# Patient Record
Sex: FEMALE | Race: WHITE | NOT HISPANIC OR LATINO | Employment: OTHER | URBAN - METROPOLITAN AREA
[De-identification: names, ages, dates, MRNs, and addresses within clinical notes are randomized per-mention and may not be internally consistent; named-entity substitution may affect disease eponyms.]

---

## 2023-03-23 LAB
RBC, URINE: 27 /HPF (ref 0–5)
SQUAMOUS EPITHELIAL CELLS, URINE: 7 /HPF
WBC CLUMPS, URINE: ABNORMAL /HPF
WBC, URINE: 62 /HPF (ref 0–5)

## 2023-03-24 LAB — URINE CULTURE: NORMAL

## 2023-04-13 LAB
6-ACETYLMORPHINE: <25 NG/ML
7-AMINOCLONAZEPAM: <25 NG/ML
ALPHA-HYDROXYALPRAZOLAM: <25 NG/ML
ALPHA-HYDROXYMIDAZOLAM: <25 NG/ML
ALPRAZOLAM: <25 NG/ML
AMPHETAMINE (PRESENCE) IN URINE BY SCREEN METHOD: ABNORMAL
BARBITURATES PRESENCE IN URINE BY SCREEN METHOD: ABNORMAL
CANNABINOIDS IN URINE BY SCREEN METHOD: ABNORMAL
CHLORDIAZEPOXIDE: <25 NG/ML
CLONAZEPAM: <25 NG/ML
COCAINE (PRESENCE) IN URINE BY SCREEN METHOD: ABNORMAL
CODEINE: <50 NG/ML
CREATINE, URINE FOR DRUG: 58.2 MG/DL
DIAZEPAM: <25 NG/ML
DRUG SCREEN COMMENT URINE: ABNORMAL
EDDP: <25 NG/ML
FENTANYL CONFIRMATION, URINE: <2.5 NG/ML
HYDROCODONE: 77 NG/ML
HYDROMORPHONE: 36 NG/ML
LORAZEPAM: <25 NG/ML
METHADONE CONFIRMATION,URINE: <25 NG/ML
MIDAZOLAM: <25 NG/ML
MORPHINE URINE: <50 NG/ML
NORDIAZEPAM: <25 NG/ML
NORFENTANYL: <2.5 NG/ML
NORHYDROCODONE: 95 NG/ML
NOROXYCODONE: <25 NG/ML
O-DESMETHYLTRAMADOL: >1000 NG/ML
OXAZEPAM: <25 NG/ML
OXYCODONE: <25 NG/ML
OXYMORPHONE: <25 NG/ML
PHENCYCLIDINE (PRESENCE) IN URINE BY SCREEN METHOD: ABNORMAL
TEMAZEPAM: <25 NG/ML
TRAMADOL: >1000 NG/ML
ZOLPIDEM METABOLITE (ZCA): <25 NG/ML
ZOLPIDEM: <25 NG/ML

## 2023-04-25 LAB
ALANINE AMINOTRANSFERASE (SGPT) (U/L) IN SER/PLAS: 17 U/L (ref 7–45)
ASPARTATE AMINOTRANSFERASE (SGOT) (U/L) IN SER/PLAS: 18 U/L (ref 9–39)
BACTERIA, URINE: ABNORMAL /HPF
CHOLESTEROL (MG/DL) IN SER/PLAS: 133 MG/DL (ref 0–199)
CHOLESTEROL IN HDL (MG/DL) IN SER/PLAS: 48.2 MG/DL
CHOLESTEROL/HDL RATIO: 2.8
HYALINE CASTS, URINE: ABNORMAL /LPF
LDL: 44 MG/DL (ref 0–99)
NON HDL CHOLESTEROL: 85 MG/DL
RBC, URINE: 2 /HPF (ref 0–5)
SQUAMOUS EPITHELIAL CELLS, URINE: 4 /HPF
TRIGLYCERIDE (MG/DL) IN SER/PLAS: 205 MG/DL (ref 0–149)
VLDL: 41 MG/DL (ref 0–40)
WBC CLUMPS, URINE: ABNORMAL /HPF
WBC, URINE: 75 /HPF (ref 0–5)

## 2023-04-28 LAB — URINE CULTURE: ABNORMAL

## 2023-07-06 LAB
RBC, URINE: 14 /HPF (ref 0–5)
SQUAMOUS EPITHELIAL CELLS, URINE: 2 /HPF
WBC CLUMPS, URINE: ABNORMAL /HPF
WBC, URINE: 274 /HPF (ref 0–5)

## 2023-07-07 LAB — URINE CULTURE: NORMAL

## 2023-09-18 LAB
MUCUS, URINE: ABNORMAL /LPF
RBC, URINE: 8 /HPF (ref 0–5)
SQUAMOUS EPITHELIAL CELLS, URINE: 3 /HPF
TRANSITIONAL EPITHELIAL CELLS, URINE: <1 /HPF
WBC CLUMPS, URINE: ABNORMAL /HPF
WBC, URINE: 92 /HPF (ref 0–5)

## 2023-09-20 LAB — URINE CULTURE: ABNORMAL

## 2023-10-16 PROBLEM — M16.12 ARTHRITIS OF LEFT HIP: Status: ACTIVE | Noted: 2023-10-16

## 2023-10-16 PROBLEM — R30.0 DYSURIA: Status: ACTIVE | Noted: 2023-10-16

## 2023-10-16 PROBLEM — G89.29 CHRONIC RADICULAR LUMBAR PAIN: Status: ACTIVE | Noted: 2023-10-16

## 2023-10-16 PROBLEM — M54.16 CHRONIC RADICULAR LUMBAR PAIN: Status: ACTIVE | Noted: 2023-10-16

## 2023-10-16 PROBLEM — B37.9 CANDIDIASIS: Status: ACTIVE | Noted: 2023-10-16

## 2023-10-16 PROBLEM — I71.40 ABDOMINAL ANEURYSM (CMS-HCC): Status: ACTIVE | Noted: 2023-10-16

## 2023-10-16 PROBLEM — G89.29 CHRONIC HIP PAIN AFTER TOTAL REPLACEMENT OF RIGHT HIP JOINT: Status: ACTIVE | Noted: 2023-10-16

## 2023-10-16 PROBLEM — R60.9 EDEMA: Status: ACTIVE | Noted: 2023-10-16

## 2023-10-16 PROBLEM — K52.9 CHRONIC DIARRHEA: Status: ACTIVE | Noted: 2023-10-16

## 2023-10-16 PROBLEM — M25.551 CHRONIC HIP PAIN AFTER TOTAL REPLACEMENT OF RIGHT HIP JOINT: Status: ACTIVE | Noted: 2023-10-16

## 2023-10-16 PROBLEM — I10 HYPERTENSION: Status: ACTIVE | Noted: 2023-10-16

## 2023-10-16 PROBLEM — M51.36 DEGENERATIVE DISC DISEASE, LUMBAR: Status: ACTIVE | Noted: 2023-10-16

## 2023-10-16 PROBLEM — M65.311 TRIGGER FINGER OF RIGHT THUMB: Status: ACTIVE | Noted: 2023-10-16

## 2023-10-16 PROBLEM — K21.00 GASTROESOPHAGEAL REFLUX DISEASE WITH ESOPHAGITIS: Status: ACTIVE | Noted: 2023-10-16

## 2023-10-16 PROBLEM — Z96.641 CHRONIC HIP PAIN AFTER TOTAL REPLACEMENT OF RIGHT HIP JOINT: Status: ACTIVE | Noted: 2023-10-16

## 2023-10-16 PROBLEM — Z96.642 STATUS POST LEFT HIP REPLACEMENT: Status: ACTIVE | Noted: 2023-10-16

## 2023-10-16 PROBLEM — M48.00 SPINAL STENOSIS: Status: ACTIVE | Noted: 2023-10-16

## 2023-10-16 PROBLEM — I70.90 ATHEROSCLEROSIS: Status: ACTIVE | Noted: 2023-10-16

## 2023-10-16 PROBLEM — F32.A DEPRESSION: Status: ACTIVE | Noted: 2023-10-16

## 2023-10-16 PROBLEM — N95.2 ATROPHY OF VAGINA: Status: ACTIVE | Noted: 2023-10-16

## 2023-10-16 PROBLEM — M54.30 SCIATICA: Status: ACTIVE | Noted: 2023-10-16

## 2023-10-16 PROBLEM — E11.9 DIABETES MELLITUS (MULTI): Status: ACTIVE | Noted: 2023-10-16

## 2023-10-16 PROBLEM — M51.369 DEGENERATIVE DISC DISEASE, LUMBAR: Status: ACTIVE | Noted: 2023-10-16

## 2023-10-16 PROBLEM — M54.50 LUMBAGO: Status: ACTIVE | Noted: 2023-10-16

## 2023-10-16 PROBLEM — M54.16 RIGHT LUMBAR RADICULOPATHY: Status: ACTIVE | Noted: 2023-10-16

## 2023-10-16 PROBLEM — M17.9 OSTEOARTHRITIS OF KNEE: Status: ACTIVE | Noted: 2023-10-16

## 2023-10-16 PROBLEM — M65.30 TRIGGER FINGER: Status: ACTIVE | Noted: 2023-10-16

## 2023-10-16 PROBLEM — Z96.641 STATUS POST RIGHT HIP REPLACEMENT: Status: ACTIVE | Noted: 2023-10-16

## 2023-10-16 PROBLEM — E55.9 VITAMIN D INSUFFICIENCY: Status: ACTIVE | Noted: 2023-10-16

## 2023-10-16 PROBLEM — Z79.891 LONG TERM (CURRENT) USE OF OPIATE ANALGESIC: Status: ACTIVE | Noted: 2023-10-16

## 2023-10-16 PROBLEM — M51.26 HERNIATED NUCLEUS PULPOSUS, L4-5: Status: ACTIVE | Noted: 2023-10-16

## 2023-10-16 PROBLEM — M16.11 OSTEOARTHRITIS OF RIGHT HIP: Status: ACTIVE | Noted: 2023-10-16

## 2023-10-16 PROBLEM — K52.9 NONINFECTIVE GASTROENTERITIS AND COLITIS, UNSPECIFIED: Status: ACTIVE | Noted: 2023-10-16

## 2023-10-16 PROBLEM — F17.200 TOBACCO DEPENDENCE: Status: ACTIVE | Noted: 2023-10-16

## 2023-10-16 PROBLEM — F41.9 ANXIETY: Status: ACTIVE | Noted: 2023-10-16

## 2023-10-16 PROBLEM — E78.5 HYPERLIPIDEMIA: Status: ACTIVE | Noted: 2023-10-16

## 2023-10-16 PROBLEM — R06.00 DYSPNEA: Status: ACTIVE | Noted: 2023-10-16

## 2023-10-16 PROBLEM — M51.27 OTHER INTERVERTEBRAL DISC DISPLACEMENT, LUMBOSACRAL REGION: Status: ACTIVE | Noted: 2023-10-16

## 2023-10-16 PROBLEM — I25.10 CAD (CORONARY ARTERY DISEASE), NATIVE CORONARY ARTERY: Status: ACTIVE | Noted: 2023-10-16

## 2023-10-16 PROBLEM — R80.1 PERSISTENT PROTEINURIA: Status: ACTIVE | Noted: 2023-10-16

## 2023-10-16 RX ORDER — GABAPENTIN 300 MG/1
300 CAPSULE ORAL 2 TIMES DAILY
COMMUNITY
End: 2024-01-16 | Stop reason: SDUPTHER

## 2023-10-16 RX ORDER — CYCLOBENZAPRINE HCL 5 MG
5 TABLET ORAL 3 TIMES DAILY PRN
COMMUNITY
End: 2024-01-16 | Stop reason: SDUPTHER

## 2023-10-16 RX ORDER — ATORVASTATIN CALCIUM 80 MG/1
1 TABLET, FILM COATED ORAL DAILY
COMMUNITY

## 2023-10-16 RX ORDER — METFORMIN HYDROCHLORIDE 500 MG/1
500 TABLET, EXTENDED RELEASE ORAL EVERY EVENING
COMMUNITY

## 2023-10-16 RX ORDER — PNV NO.95/FERROUS FUM/FOLIC AC 28MG-0.8MG
1 TABLET ORAL DAILY
COMMUNITY
End: 2023-10-17 | Stop reason: SDUPTHER

## 2023-10-16 RX ORDER — HYDROCODONE BITARTRATE AND ACETAMINOPHEN 5; 325 MG/1; MG/1
1 TABLET ORAL EVERY 4 HOURS PRN
COMMUNITY
End: 2023-10-25 | Stop reason: ALTCHOICE

## 2023-10-16 RX ORDER — SOLIFENACIN SUCCINATE 5 MG/1
5 TABLET, FILM COATED ORAL EVERY OTHER DAY
COMMUNITY
End: 2024-04-18 | Stop reason: ALTCHOICE

## 2023-10-16 RX ORDER — AMLODIPINE AND BENAZEPRIL HYDROCHLORIDE 5; 20 MG/1; MG/1
1 CAPSULE ORAL DAILY
COMMUNITY
End: 2023-10-25 | Stop reason: ALTCHOICE

## 2023-10-16 RX ORDER — ASPIRIN 81 MG/1
1 TABLET ORAL DAILY
COMMUNITY

## 2023-10-16 RX ORDER — ASCORBIC ACID 500 MG
1 TABLET ORAL DAILY
COMMUNITY
End: 2023-10-17 | Stop reason: SDUPTHER

## 2023-10-16 RX ORDER — LANOLIN ALCOHOL/MO/W.PET/CERES
CREAM (GRAM) TOPICAL EVERY OTHER DAY
COMMUNITY

## 2023-10-16 RX ORDER — AMLODIPINE BESYLATE 10 MG/1
1 TABLET ORAL DAILY
COMMUNITY
End: 2024-04-18 | Stop reason: WASHOUT

## 2023-10-16 RX ORDER — MULTIVITAMIN
1 TABLET ORAL DAILY
COMMUNITY

## 2023-10-16 RX ORDER — EZETIMIBE 10 MG/1
10 TABLET ORAL DAILY
COMMUNITY
End: 2024-04-26

## 2023-10-16 RX ORDER — BIOTIN 5000 MCG
1 TABLET,DISINTEGRATING ORAL DAILY
COMMUNITY
End: 2023-10-17 | Stop reason: SDUPTHER

## 2023-10-16 RX ORDER — OXYBUTYNIN CHLORIDE 5 MG/1
5 TABLET ORAL DAILY
COMMUNITY
End: 2024-04-18 | Stop reason: ALTCHOICE

## 2023-10-16 RX ORDER — ACETAMINOPHEN 325 MG/1
325 TABLET ORAL EVERY 6 HOURS PRN
COMMUNITY

## 2023-10-16 RX ORDER — FLUTICASONE PROPIONATE 50 MCG
1 SPRAY, SUSPENSION (ML) NASAL DAILY
COMMUNITY
End: 2023-10-25 | Stop reason: ALTCHOICE

## 2023-10-16 RX ORDER — METOPROLOL SUCCINATE 50 MG/1
1 TABLET, EXTENDED RELEASE ORAL DAILY
COMMUNITY
End: 2023-11-17

## 2023-10-16 RX ORDER — ZINC GLUCONATE 100 MG
TABLET ORAL
COMMUNITY

## 2023-10-16 RX ORDER — CIPROFLOXACIN 500 MG/1
500 TABLET ORAL 2 TIMES DAILY
COMMUNITY
End: 2023-10-25 | Stop reason: ALTCHOICE

## 2023-10-16 RX ORDER — ACETAMINOPHEN 500 MG
4125 TABLET ORAL DAILY
COMMUNITY

## 2023-10-16 RX ORDER — MULTIVIT-MIN/IRON/FOLIC ACID/K 18-600-40
CAPSULE ORAL
COMMUNITY

## 2023-10-16 RX ORDER — NYSTATIN 100000 U/G
CREAM TOPICAL 2 TIMES DAILY
COMMUNITY
End: 2023-10-25 | Stop reason: ALTCHOICE

## 2023-10-16 RX ORDER — MIRABEGRON 25 MG/1
2 TABLET, FILM COATED, EXTENDED RELEASE ORAL DAILY
COMMUNITY
End: 2024-04-18 | Stop reason: ALTCHOICE

## 2023-10-16 RX ORDER — NITROFURANTOIN (MACROCRYSTALS) 100 MG/1
100 CAPSULE ORAL
COMMUNITY
End: 2023-10-25 | Stop reason: ALTCHOICE

## 2023-10-16 RX ORDER — FAMOTIDINE, CALCIUM CARBONATE, AND MAGNESIUM HYDROXIDE 10; 800; 165 MG/1; MG/1; MG/1
1 TABLET, CHEWABLE ORAL EVERY 12 HOURS PRN
COMMUNITY

## 2023-10-16 RX ORDER — CHOLECALCIFEROL (VITAMIN D3) 25 MCG
50 TABLET ORAL DAILY
COMMUNITY
End: 2023-10-17 | Stop reason: SDUPTHER

## 2023-10-16 RX ORDER — HYDROCHLOROTHIAZIDE 25 MG/1
1 TABLET ORAL DAILY
COMMUNITY
End: 2024-04-24 | Stop reason: ALTCHOICE

## 2023-10-16 RX ORDER — MIRABEGRON 50 MG/1
1 TABLET, EXTENDED RELEASE ORAL DAILY
COMMUNITY
End: 2023-10-25 | Stop reason: DRUGHIGH

## 2023-10-16 RX ORDER — ASPIRIN 81 MG/1
81 TABLET ORAL DAILY
COMMUNITY
End: 2023-10-17 | Stop reason: SDUPTHER

## 2023-10-16 RX ORDER — HYDROCODONE BITARTRATE AND ACETAMINOPHEN 5; 325 MG/1; MG/1
1 TABLET ORAL 2 TIMES DAILY PRN
COMMUNITY
End: 2023-10-25 | Stop reason: ALTCHOICE

## 2023-10-16 RX ORDER — NALOXONE HYDROCHLORIDE 4 MG/.1ML
SPRAY NASAL
COMMUNITY
End: 2023-10-25 | Stop reason: ALTCHOICE

## 2023-10-16 RX ORDER — GABAPENTIN 300 MG/1
1 CAPSULE ORAL 3 TIMES DAILY
COMMUNITY
End: 2023-10-25 | Stop reason: DRUGHIGH

## 2023-10-16 RX ORDER — TRAMADOL HYDROCHLORIDE 50 MG/1
50 TABLET ORAL 2 TIMES DAILY PRN
COMMUNITY
End: 2024-03-05 | Stop reason: ALTCHOICE

## 2023-10-17 ENCOUNTER — OFFICE VISIT (OUTPATIENT)
Dept: UROLOGY | Facility: CLINIC | Age: 68
End: 2023-10-17
Payer: MEDICARE

## 2023-10-17 VITALS
HEIGHT: 64 IN | BODY MASS INDEX: 38.58 KG/M2 | DIASTOLIC BLOOD PRESSURE: 81 MMHG | SYSTOLIC BLOOD PRESSURE: 144 MMHG | WEIGHT: 226 LBS | HEART RATE: 80 BPM | TEMPERATURE: 98.1 F

## 2023-10-17 DIAGNOSIS — R35.1 NOCTURIA: ICD-10-CM

## 2023-10-17 DIAGNOSIS — N39.41 URGE INCONTINENCE: Primary | ICD-10-CM

## 2023-10-17 DIAGNOSIS — N39.0 RECURRENT UTI: ICD-10-CM

## 2023-10-17 LAB
POC APPEARANCE, URINE: CLEAR
POC BILIRUBIN, URINE: NEGATIVE
POC BLOOD, URINE: NEGATIVE
POC COLOR, URINE: YELLOW
POC GLUCOSE, URINE: NEGATIVE MG/DL
POC KETONES, URINE: NEGATIVE MG/DL
POC LEUKOCYTES, URINE: NEGATIVE
POC NITRITE,URINE: NEGATIVE
POC PH, URINE: 5.5 PH
POC PROTEIN, URINE: NEGATIVE MG/DL
POC SPECIFIC GRAVITY, URINE: 1.02
POC UROBILINOGEN, URINE: 0.2 EU/DL

## 2023-10-17 PROCEDURE — 3077F SYST BP >= 140 MM HG: CPT | Performed by: NURSE PRACTITIONER

## 2023-10-17 PROCEDURE — 1159F MED LIST DOCD IN RCRD: CPT | Performed by: NURSE PRACTITIONER

## 2023-10-17 PROCEDURE — 81003 URINALYSIS AUTO W/O SCOPE: CPT | Performed by: NURSE PRACTITIONER

## 2023-10-17 PROCEDURE — 3079F DIAST BP 80-89 MM HG: CPT | Performed by: NURSE PRACTITIONER

## 2023-10-17 PROCEDURE — 51798 US URINE CAPACITY MEASURE: CPT | Performed by: NURSE PRACTITIONER

## 2023-10-17 PROCEDURE — 99213 OFFICE O/P EST LOW 20 MIN: CPT | Performed by: NURSE PRACTITIONER

## 2023-10-17 NOTE — PROGRESS NOTES
"10/17/23   [id number]    Chief Complaint   Patient presents with    Follow-up    Urinary Frequency    Urinary Urgency    recurrent uti     Pt presents for 5 week follow up      Subjective  Med response, PVR     HPI Amanda De Jesus is a 68 y.o. female who presents for follow up recurrent UTIs. Dmanose, Estrogen vaginal cream, didn't tolerate nightly prophylactic abx caused nausea (macrodantin and trimethoprim); OAB managed w Myrbetriq 50 mg in late afternoon and Oxybutynin ER 5 mg added for bedtime, here today for med response, PVR; wasn't interested in 3rd line options; 75-80% better overall now; happy w combination therapy, a little constipation but manages w diet; no leakage now at all; monitoring fluids in evening; UA completely negative today;     9/18/23 Ecoli  4?25/23 Enterobacter    Cysto by Dr. Noble 9/22/21 normal  8/15/21 CT abd/pel w IV: atrophic left kidney, right kidney unremarkable;   8/20/222 GUSTAVO no hydro, similar finding to CT w left atrophic kidney;         Objective     /81   Pulse 80   Temp 36.7 °C (98.1 °F)   Ht 1.626 m (5' 4\")   Wt 103 kg (226 lb)   BMI 38.79 kg/m²    Physical Exam  Constitutional:       Appearance: Normal appearance. She is normal weight.   HENT:      Head: Normocephalic and atraumatic.      Nose: Nose normal.   Pulmonary:      Effort: Pulmonary effort is normal.   Musculoskeletal:         General: Normal range of motion.      Cervical back: Normal range of motion.   Neurological:      General: No focal deficit present.      Mental Status: She is alert and oriented to person, place, and time.   Psychiatric:         Mood and Affect: Mood normal.         Thought Content: Thought content normal.         Judgment: Judgment normal.         Assessment/Plan   Problem List Items Addressed This Visit    None  Visit Diagnoses       Urge incontinence    -  Primary    Relevant Orders    POCT UA Automated manually resulted (Completed)    Post-Void Residual (Completed)    Recurrent UTI "        Nocturia              Orders Placed This Encounter   Procedures    Post-Void Residual    POCT UA Automated manually resulted     Order Specific Question:   Release result to [facility]     Answer:   Immediate [1]      Continue UTI prevention w Dmanose and Estrogen vaginal cream  Continue OAB management w Myrbetriq 50 mg and Oxybutynin ER 5 mg  Follow up 6 mos  Sooner if any concerns  Nurse line [phone]       Janett Giles, APRN-CNP  Lab Results   Component Value Date    GLUCOSE 93 09/30/2022    CALCIUM 9.7 09/30/2022     09/30/2022    K 4.3 09/30/2022    CO2 28 09/30/2022     09/30/2022    BUN 18 09/30/2022    CREATININE 1.11 (H) 09/30/2022

## 2023-10-17 NOTE — PATIENT INSTRUCTIONS
Continue UTI prevention w Dmanose and Estrogen vaginal cream  Continue OAB management w Myrbetriq 50 mg and Oxybutynin ER 5 mg  Follow up 6 mos  Sooner if any concerns  Nurse line [phone]

## 2023-10-23 ENCOUNTER — LAB (OUTPATIENT)
Dept: LAB | Facility: LAB | Age: 68
End: 2023-10-23
Payer: MEDICARE

## 2023-10-23 ENCOUNTER — ANCILLARY PROCEDURE (OUTPATIENT)
Dept: RADIOLOGY | Facility: CLINIC | Age: 68
End: 2023-10-23
Payer: MEDICARE

## 2023-10-23 DIAGNOSIS — E11.9 TYPE 2 DIABETES MELLITUS WITHOUT COMPLICATIONS (MULTI): ICD-10-CM

## 2023-10-23 DIAGNOSIS — N39.41 URGE INCONTINENCE: ICD-10-CM

## 2023-10-23 DIAGNOSIS — Z00.00 ENCOUNTER FOR GENERAL ADULT MEDICAL EXAMINATION WITHOUT ABNORMAL FINDINGS: Primary | ICD-10-CM

## 2023-10-23 DIAGNOSIS — Z79.891 LONG TERM (CURRENT) USE OF OPIATE ANALGESIC: ICD-10-CM

## 2023-10-23 DIAGNOSIS — M51.26 OTHER INTERVERTEBRAL DISC DISPLACEMENT, LUMBAR REGION: ICD-10-CM

## 2023-10-23 LAB
ALBUMIN SERPL BCP-MCNC: 4.2 G/DL (ref 3.4–5)
ALP SERPL-CCNC: 92 U/L (ref 33–136)
ALT SERPL W P-5'-P-CCNC: 21 U/L (ref 7–45)
AMPHETAMINES UR QL SCN: NORMAL
ANION GAP SERPL CALC-SCNC: 15 MMOL/L (ref 10–20)
AST SERPL W P-5'-P-CCNC: 20 U/L (ref 9–39)
BARBITURATES UR QL SCN: NORMAL
BASOPHILS # BLD AUTO: 0.03 X10*3/UL (ref 0–0.1)
BASOPHILS NFR BLD AUTO: 0.5 %
BILIRUB SERPL-MCNC: 0.6 MG/DL (ref 0–1.2)
BUN SERPL-MCNC: 27 MG/DL (ref 6–23)
BZE UR QL SCN: NORMAL
CALCIUM SERPL-MCNC: 10.1 MG/DL (ref 8.6–10.6)
CANNABINOIDS UR QL SCN: NORMAL
CHLORIDE SERPL-SCNC: 102 MMOL/L (ref 98–107)
CHOLEST SERPL-MCNC: 138 MG/DL (ref 0–199)
CHOLESTEROL/HDL RATIO: 2.8
CO2 SERPL-SCNC: 27 MMOL/L (ref 21–32)
CREAT SERPL-MCNC: 1.31 MG/DL (ref 0.5–1.05)
CREAT UR-MCNC: 74.9 MG/DL (ref 20–320)
EOSINOPHIL # BLD AUTO: 0.15 X10*3/UL (ref 0–0.7)
EOSINOPHIL NFR BLD AUTO: 2.4 %
ERYTHROCYTE [DISTWIDTH] IN BLOOD BY AUTOMATED COUNT: 14.6 % (ref 11.5–14.5)
EST. AVERAGE GLUCOSE BLD GHB EST-MCNC: 143 MG/DL
GFR SERPL CREATININE-BSD FRML MDRD: 44 ML/MIN/1.73M*2
GLUCOSE SERPL-MCNC: 107 MG/DL (ref 74–99)
HBA1C MFR BLD: 6.6 %
HCT VFR BLD AUTO: 46.4 % (ref 36–46)
HDLC SERPL-MCNC: 49.5 MG/DL
HGB BLD-MCNC: 15.3 G/DL (ref 12–16)
IMM GRANULOCYTES # BLD AUTO: 0.02 X10*3/UL (ref 0–0.7)
IMM GRANULOCYTES NFR BLD AUTO: 0.3 % (ref 0–0.9)
LDLC SERPL CALC-MCNC: 50 MG/DL
LYMPHOCYTES # BLD AUTO: 2.27 X10*3/UL (ref 1.2–4.8)
LYMPHOCYTES NFR BLD AUTO: 36.8 %
MAGNESIUM SERPL-MCNC: 2.18 MG/DL (ref 1.6–2.4)
MCH RBC QN AUTO: 31.2 PG (ref 26–34)
MCHC RBC AUTO-ENTMCNC: 33 G/DL (ref 32–36)
MCV RBC AUTO: 95 FL (ref 80–100)
MONOCYTES # BLD AUTO: 0.55 X10*3/UL (ref 0.1–1)
MONOCYTES NFR BLD AUTO: 8.9 %
NEUTROPHILS # BLD AUTO: 3.15 X10*3/UL (ref 1.2–7.7)
NEUTROPHILS NFR BLD AUTO: 51.1 %
NON HDL CHOLESTEROL: 89 MG/DL (ref 0–149)
NRBC BLD-RTO: 0 /100 WBCS (ref 0–0)
PCP UR QL SCN: NORMAL
PLATELET # BLD AUTO: 207 X10*3/UL (ref 150–450)
PMV BLD AUTO: 10.4 FL (ref 7.5–11.5)
POTASSIUM SERPL-SCNC: 4.3 MMOL/L (ref 3.5–5.3)
PROT SERPL-MCNC: 7.3 G/DL (ref 6.4–8.2)
RBC # BLD AUTO: 4.91 X10*6/UL (ref 4–5.2)
SODIUM SERPL-SCNC: 140 MMOL/L (ref 136–145)
TRIGL SERPL-MCNC: 195 MG/DL (ref 0–149)
TSH SERPL-ACNC: 1.77 MIU/L (ref 0.44–3.98)
VLDL: 39 MG/DL (ref 0–40)
WBC # BLD AUTO: 6.2 X10*3/UL (ref 4.4–11.3)

## 2023-10-23 PROCEDURE — 80373 DRUG SCREENING TRAMADOL: CPT

## 2023-10-23 PROCEDURE — 80354 DRUG SCREENING FENTANYL: CPT

## 2023-10-23 PROCEDURE — 80307 DRUG TEST PRSMV CHEM ANLYZR: CPT

## 2023-10-23 PROCEDURE — 83735 ASSAY OF MAGNESIUM: CPT

## 2023-10-23 PROCEDURE — 84443 ASSAY THYROID STIM HORMONE: CPT

## 2023-10-23 PROCEDURE — 85025 COMPLETE CBC W/AUTO DIFF WBC: CPT

## 2023-10-23 PROCEDURE — 80368 SEDATIVE HYPNOTICS: CPT

## 2023-10-23 PROCEDURE — 80053 COMPREHEN METABOLIC PANEL: CPT

## 2023-10-23 PROCEDURE — 80346 BENZODIAZEPINES1-12: CPT

## 2023-10-23 PROCEDURE — 80061 LIPID PANEL: CPT

## 2023-10-23 PROCEDURE — 36415 COLL VENOUS BLD VENIPUNCTURE: CPT

## 2023-10-23 PROCEDURE — 72120 X-RAY BEND ONLY L-S SPINE: CPT | Mod: FY

## 2023-10-23 PROCEDURE — 80358 DRUG SCREENING METHADONE: CPT

## 2023-10-23 PROCEDURE — 72114 X-RAY EXAM L-S SPINE BENDING: CPT | Performed by: RADIOLOGY

## 2023-10-23 PROCEDURE — 80365 DRUG SCREENING OXYCODONE: CPT

## 2023-10-23 PROCEDURE — 80361 OPIATES 1 OR MORE: CPT

## 2023-10-23 PROCEDURE — 83036 HEMOGLOBIN GLYCOSYLATED A1C: CPT

## 2023-10-23 PROCEDURE — 82570 ASSAY OF URINE CREATININE: CPT

## 2023-10-25 ENCOUNTER — OFFICE VISIT (OUTPATIENT)
Dept: CARDIOLOGY | Facility: HOSPITAL | Age: 68
End: 2023-10-25
Payer: MEDICARE

## 2023-10-25 ENCOUNTER — HOSPITAL ENCOUNTER (OUTPATIENT)
Dept: VASCULAR MEDICINE | Facility: HOSPITAL | Age: 68
Discharge: HOME | End: 2023-10-25
Payer: MEDICARE

## 2023-10-25 VITALS
SYSTOLIC BLOOD PRESSURE: 135 MMHG | BODY MASS INDEX: 38.46 KG/M2 | HEIGHT: 64 IN | DIASTOLIC BLOOD PRESSURE: 75 MMHG | HEART RATE: 80 BPM | WEIGHT: 225.3 LBS

## 2023-10-25 DIAGNOSIS — I25.10 CORONARY ARTERY DISEASE INVOLVING NATIVE CORONARY ARTERY OF NATIVE HEART WITHOUT ANGINA PECTORIS: ICD-10-CM

## 2023-10-25 DIAGNOSIS — I73.9 CLAUDICATION (CMS-HCC): Primary | ICD-10-CM

## 2023-10-25 DIAGNOSIS — I73.9 CLAUDICATION (CMS-HCC): ICD-10-CM

## 2023-10-25 DIAGNOSIS — I71.40 ABDOMINAL AORTIC ANEURYSM (AAA) WITHOUT RUPTURE, UNSPECIFIED PART (CMS-HCC): Primary | ICD-10-CM

## 2023-10-25 LAB
ATRIAL RATE: 80 BPM
P AXIS: 6 DEGREES
P OFFSET: 188 MS
P ONSET: 134 MS
PR INTERVAL: 184 MS
Q ONSET: 226 MS
QRS COUNT: 14 BEATS
QRS DURATION: 70 MS
QT INTERVAL: 388 MS
QTC CALCULATION(BAZETT): 447 MS
QTC FREDERICIA: 427 MS
R AXIS: 68 DEGREES
T AXIS: 69 DEGREES
T OFFSET: 420 MS
VENTRICULAR RATE: 80 BPM

## 2023-10-25 PROCEDURE — 1159F MED LIST DOCD IN RCRD: CPT | Performed by: INTERNAL MEDICINE

## 2023-10-25 PROCEDURE — 93005 ELECTROCARDIOGRAM TRACING: CPT | Performed by: INTERNAL MEDICINE

## 2023-10-25 PROCEDURE — 3048F LDL-C <100 MG/DL: CPT | Performed by: INTERNAL MEDICINE

## 2023-10-25 PROCEDURE — 3044F HG A1C LEVEL LT 7.0%: CPT | Performed by: INTERNAL MEDICINE

## 2023-10-25 PROCEDURE — 93922 UPR/L XTREMITY ART 2 LEVELS: CPT

## 2023-10-25 PROCEDURE — 3075F SYST BP GE 130 - 139MM HG: CPT | Performed by: INTERNAL MEDICINE

## 2023-10-25 PROCEDURE — 3078F DIAST BP <80 MM HG: CPT | Performed by: INTERNAL MEDICINE

## 2023-10-25 PROCEDURE — 1160F RVW MEDS BY RX/DR IN RCRD: CPT | Performed by: INTERNAL MEDICINE

## 2023-10-25 PROCEDURE — 3060F POS MICROALBUMINURIA REV: CPT | Performed by: INTERNAL MEDICINE

## 2023-10-25 PROCEDURE — 99214 OFFICE O/P EST MOD 30 MIN: CPT | Performed by: INTERNAL MEDICINE

## 2023-10-25 PROCEDURE — 93922 UPR/L XTREMITY ART 2 LEVELS: CPT | Performed by: INTERNAL MEDICINE

## 2023-10-25 PROCEDURE — 99214 OFFICE O/P EST MOD 30 MIN: CPT | Mod: 25 | Performed by: INTERNAL MEDICINE

## 2023-10-25 PROCEDURE — 93010 ELECTROCARDIOGRAM REPORT: CPT | Performed by: INTERNAL MEDICINE

## 2023-10-25 RX ORDER — VARENICLINE TARTRATE 0.5 MG/1
0.5 TABLET, FILM COATED ORAL 2 TIMES DAILY
COMMUNITY

## 2023-10-25 ASSESSMENT — ENCOUNTER SYMPTOMS
OCCASIONAL FEELINGS OF UNSTEADINESS: 0
LOSS OF SENSATION IN FEET: 0
DEPRESSION: 0

## 2023-10-25 NOTE — PROGRESS NOTES
Subjective:  Amanda returns for routine 6-month follow-up.  She generally has been clinically stable.  She has not had any hospitalizations.  She denies any other new health concerns.  She denies any exertional chest discomfort at a good activity level.  This is in the setting of very remote inferior MI and RCA stenting.  She is taking her medications compliantly and is tolerating them well.  She was having a cough and she had her benazepril stopped and losartan was substituted.  She overall is generally happy with how she is doing.  She is having some atypical leg symptoms and did have a screening circulation test which raise possibility of some PAD.  She did not bring the test results in with her.  She remains compliant with her combination therapy for her hyperlipidemia.  She has had a good response to the addition of ezetimibe.    Objective:  General: Alert, usual pleasant self.  HEENT: Unchanged.  Lungs: No crackles or wheezing.  Cardiac: Distant heart tones without murmur rub or S3.  Abdomen: Nontender.  Extremities: No edema.  Skin: No rash.  Neuro: Grossly intact.    EKG: Normal sinus rhythm.  Low voltage.  Slow R wave progression.    Lipid panel: Cholesterol-133, HDL-48, LDL-44, TG-205.    Impression/plan:    Amanda is doing well at this time.  She does not have any concerning symptoms to suggest progressive coronary disease.  Her heart rate and blood pressure remain under good control on her current regimen.  Her lipid panel also looks quite good.  I will plan on checking formal ABIs just to make sure we are not missing any occult significant PAD that may warrant attention.  I will review these test results with her when they are available.  I will see her back in follow-up in 6 months but she knows to call for any intercurrent concerns.  She did not need any prescriptions renewed.      Patient instructions:    Continue current medications unchanged.    Obtain your ABIs and call for results.    Return to  clinic in 6 months.

## 2023-10-26 ENCOUNTER — TELEPHONE (OUTPATIENT)
Dept: CARDIOLOGY | Facility: HOSPITAL | Age: 68
End: 2023-10-26
Payer: MEDICARE

## 2023-10-26 LAB
1OH-MIDAZOLAM UR CFM-MCNC: <25 NG/ML
6MAM UR CFM-MCNC: <25 NG/ML
7AMINOCLONAZEPAM UR CFM-MCNC: <25 NG/ML
A-OH ALPRAZ UR CFM-MCNC: <25 NG/ML
ALPRAZ UR CFM-MCNC: <25 NG/ML
CHLORDIAZEP UR CFM-MCNC: <25 NG/ML
CLONAZEPAM UR CFM-MCNC: <25 NG/ML
CODEINE UR CFM-MCNC: <50 NG/ML
DIAZEPAM UR CFM-MCNC: <25 NG/ML
EDDP UR CFM-MCNC: <25 NG/ML
FENTANYL UR CFM-MCNC: <2.5 NG/ML
HYDROCODONE CTO UR CFM-MCNC: <25 NG/ML
HYDROMORPHONE UR CFM-MCNC: <25 NG/ML
LORAZEPAM UR CFM-MCNC: <25 NG/ML
METHADONE UR CFM-MCNC: <25 NG/ML
MIDAZOLAM UR CFM-MCNC: <25 NG/ML
MORPHINE UR CFM-MCNC: <50 NG/ML
NORDIAZEPAM UR CFM-MCNC: <25 NG/ML
NORFENTANYL UR CFM-MCNC: <2.5 NG/ML
NORHYDROCODONE UR CFM-MCNC: <25 NG/ML
NOROXYCODONE UR CFM-MCNC: <25 NG/ML
NORTRAMADOL UR-MCNC: >1000 NG/ML
NORTRAMADOL UR-MCNC: >1000 NG/ML
OXAZEPAM UR CFM-MCNC: <25 NG/ML
OXYCODONE UR CFM-MCNC: <25 NG/ML
OXYMORPHONE UR CFM-MCNC: <25 NG/ML
TEMAZEPAM UR CFM-MCNC: <25 NG/ML
TRAMADOL UR CFM-MCNC: >1000 NG/ML
TRAMADOL UR CFM-MCNC: >1000 NG/ML
ZOLPIDEM UR CFM-MCNC: <25 NG/ML
ZOLPIDEM UR-MCNC: <25 NG/ML

## 2023-11-02 ENCOUNTER — ANCILLARY PROCEDURE (OUTPATIENT)
Dept: RADIOLOGY | Facility: CLINIC | Age: 68
End: 2023-11-02
Payer: MEDICARE

## 2023-11-02 ENCOUNTER — TELEPHONE (OUTPATIENT)
Dept: CARDIOLOGY | Facility: HOSPITAL | Age: 68
End: 2023-11-02

## 2023-11-02 DIAGNOSIS — F17.200 NICOTINE DEPENDENCE, UNSPECIFIED, UNCOMPLICATED: ICD-10-CM

## 2023-11-02 DIAGNOSIS — Z87.891 HX OF SMOKING: ICD-10-CM

## 2023-11-02 DIAGNOSIS — I71.40 ABDOMINAL AORTIC ANEURYSM (AAA) WITHOUT RUPTURE, UNSPECIFIED PART (CMS-HCC): Primary | ICD-10-CM

## 2023-11-02 PROCEDURE — 71271 CT THORAX LUNG CANCER SCR C-: CPT

## 2023-11-02 PROCEDURE — 71271 CT THORAX LUNG CANCER SCR C-: CPT | Performed by: RADIOLOGY

## 2023-11-02 PROCEDURE — 74174 CTA ABD&PLVS W/CONTRAST: CPT

## 2023-11-02 PROCEDURE — 2550000001 HC RX 255 CONTRASTS: Performed by: PHYSICAL MEDICINE & REHABILITATION

## 2023-11-02 PROCEDURE — 71275 CT ANGIOGRAPHY CHEST: CPT | Performed by: STUDENT IN AN ORGANIZED HEALTH CARE EDUCATION/TRAINING PROGRAM

## 2023-11-02 PROCEDURE — 74174 CTA ABD&PLVS W/CONTRAST: CPT | Performed by: STUDENT IN AN ORGANIZED HEALTH CARE EDUCATION/TRAINING PROGRAM

## 2023-11-02 RX ADMIN — IOHEXOL 100 ML: 350 INJECTION, SOLUTION INTRAVENOUS at 10:22

## 2023-11-09 ENCOUNTER — APPOINTMENT (OUTPATIENT)
Dept: RADIOLOGY | Facility: CLINIC | Age: 68
End: 2023-11-09
Payer: MEDICARE

## 2023-11-13 ENCOUNTER — OFFICE VISIT (OUTPATIENT)
Dept: VASCULAR SURGERY | Facility: HOSPITAL | Age: 68
End: 2023-11-13
Payer: MEDICARE

## 2023-11-13 VITALS
HEART RATE: 86 BPM | HEIGHT: 64 IN | OXYGEN SATURATION: 93 % | WEIGHT: 226 LBS | BODY MASS INDEX: 38.58 KG/M2 | DIASTOLIC BLOOD PRESSURE: 85 MMHG | SYSTOLIC BLOOD PRESSURE: 144 MMHG

## 2023-11-13 DIAGNOSIS — I71.42 JUXTARENAL ABDOMINAL AORTIC ANEURYSM (AAA) WITHOUT RUPTURE (CMS-HCC): Primary | ICD-10-CM

## 2023-11-13 PROCEDURE — 3060F POS MICROALBUMINURIA REV: CPT | Performed by: SURGERY

## 2023-11-13 PROCEDURE — 99204 OFFICE O/P NEW MOD 45 MIN: CPT | Performed by: SURGERY

## 2023-11-13 PROCEDURE — 3048F LDL-C <100 MG/DL: CPT | Performed by: SURGERY

## 2023-11-13 PROCEDURE — 1160F RVW MEDS BY RX/DR IN RCRD: CPT | Performed by: SURGERY

## 2023-11-13 PROCEDURE — 3077F SYST BP >= 140 MM HG: CPT | Performed by: SURGERY

## 2023-11-13 PROCEDURE — 1159F MED LIST DOCD IN RCRD: CPT | Performed by: SURGERY

## 2023-11-13 PROCEDURE — 99214 OFFICE O/P EST MOD 30 MIN: CPT | Performed by: SURGERY

## 2023-11-13 PROCEDURE — 3079F DIAST BP 80-89 MM HG: CPT | Performed by: SURGERY

## 2023-11-13 PROCEDURE — 3044F HG A1C LEVEL LT 7.0%: CPT | Performed by: SURGERY

## 2023-11-13 ASSESSMENT — ENCOUNTER SYMPTOMS
WEAKNESS: 0
SHORTNESS OF BREATH: 0
DIZZINESS: 0
UNEXPECTED WEIGHT CHANGE: 0
WOUND: 0
APNEA: 0
ABDOMINAL PAIN: 0

## 2023-11-13 NOTE — PROGRESS NOTES
Vascular Surgery Consult/Clinic Note    CC: AAA    HPI:  Amanda De Jesus is 68 y.o. female with history of CAD (hx of PCI for MI), HTN, HLD and single right kidney who referred for AAA. Patient reports she is trying to quit tobacco use. 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 She denies any abdominal pain. She has chronic back pain but denies any changes in severity or quality.         Meds:   Current Outpatient Medications on File Prior to Visit   Medication Sig Dispense Refill    acetaminophen (Tylenol) 325 mg tablet Take 1 tablet (325 mg) by mouth every 6 hours if needed.      amLODIPine (Norvasc) 10 mg tablet Take 1 tablet (10 mg) by mouth once daily.      ascorbic acid, vitamin C, 500 mg capsule Take by mouth. Take as directed      aspirin 81 mg EC tablet Take 1 tablet (81 mg) by mouth once daily.      atorvastatin (Lipitor) 80 mg tablet Take 1 tablet (80 mg) by mouth once daily.      cholecalciferol (Vitamin D-3) 50 mcg (2,000 unit) capsule Take 4,125 capsules (206,250 mcg) by mouth early in the morning..      CRANBERRY ORAL Take 2 capsules by mouth once daily.      cyanocobalamin (Vitamin B-12) 1,000 mcg tablet Take by mouth every other day.      cyclobenzaprine (Flexeril) 5 mg tablet Take 1 tablet (5 mg) by mouth 3 times a day as needed (for spasms).      ezetimibe (Zetia) 10 mg tablet Take 1 tablet (10 mg) by mouth once daily.      famotidine-Ca carb-mag hydrox (Pepcid Complete) -165 mg chewable tablet Chew 1 tablet every 12 hours if needed.      gabapentin (Neurontin) 300 mg capsule Take 1 capsule (300 mg) by mouth 2 times a day.      hydroCHLOROthiazide (HYDRODiuril) 25 mg tablet Take 1 tablet (25 mg) by mouth once daily.      metFORMIN  mg 24 hr tablet Take 1 tablet (500 mg) by mouth once daily in the evening.      metoprolol succinate XL (Toprol-XL) 50 mg 24 hr tablet Take 1 tablet (50 mg) by mouth once daily.      mirabegron (Myrbetriq) 25 mg tablet extended release 24 hr 24 hr tablet Take 2  tablets (50 mg) by mouth once daily.      multivitamin tablet Take 1 tablet by mouth once daily.      oxybutynin (Ditropan) 5 mg tablet Take 1 tablet (5 mg) by mouth once daily.      solifenacin (VESIcare) 5 mg tablet Take 1 tablet (5 mg) by mouth every other day.      traMADol (Ultram) 50 mg tablet Take 1 tablet (50 mg) by mouth 2 times a day as needed for severe pain (7 - 10) (Pain).      zinc gluconate 100 mg tablet Take by mouth. Zinc 100 MG Oral Tablet; TAKE AS DIRECTED.      varenicline (Chantix) 0.5 mg tablet Take 1 tablet (0.5 mg) by mouth 2 times a day. Take with full glass of water.       No current facility-administered medications on file prior to visit.        Allergies:   Allergies   Allergen Reactions    Lansoprazole Anaphylaxis and Swelling    Semaglutide Nausea Only    Cefprozil Rash and Swelling       SH:    Social Determinants of Health     Tobacco Use: High Risk (11/4/2023)    Patient History     Smoking Tobacco Use: Every Day     Smokeless Tobacco Use: Never     Passive Exposure: Not on file   Alcohol Use: Not on file   Financial Resource Strain: Not on file   Food Insecurity: Not on file   Transportation Needs: Not on file   Physical Activity: Not on file   Stress: Not on file   Social Connections: Not on file   Intimate Partner Violence: Not on file   Depression: Not on file   Housing Stability: Not on file   Utilities: Not on file   Digital Equity: Not on file        FH:  Family History   Problem Relation Name Age of Onset    Breast cancer Mother      Other (Varicose veins) Mother      Heart disease Father      Kidney failure Father      Diabetes type II Father      Pancreatic cancer Other sibling         ROS:  Review of Systems   Constitutional:  Negative for unexpected weight change.   Respiratory:  Negative for apnea and shortness of breath.    Cardiovascular:  Negative for chest pain.   Gastrointestinal:  Negative for abdominal pain.   Genitourinary:  Negative for pelvic pain.   Skin:   Negative for wound.   Neurological:  Negative for dizziness and weakness.        Objective:  Vitals:  Vitals:    11/13/23 1103   BP: 144/85   Pulse: 86   SpO2: 93%        Exam:  Gen: in NAD  GI: Soft, ND/NT  Ext:  BLE with 5/5 motor str.     Imaging:  CTA abd/pelv (11/2/2023) independently reviewed.   Juxta-renal AAA approx. 47 mm x 46 mm when measured using SVS measuring guideline.       Assessment & Plan:  Amanda De Jesus is 68 y.o. female with Asx. 47 x 46 mm Juxta-renal AA.    - Cont. ASA and statin therapy with BP control.   - Tobacco cessation.    - Discussed signs and sx. Of rupture. In such case, patient was instructed to present to the nearest ER. Patient verbalized her understanding.    - Follow up in 6 months with CTA for surveillance.       Raciel Hernandez M.D.  Clinical   [employer] [employer] [facility] [employer]  Co-Director, Aortic Center  [facility] [place]

## 2023-11-15 DIAGNOSIS — I10 HYPERTENSION, UNSPECIFIED TYPE: Primary | ICD-10-CM

## 2023-11-17 RX ORDER — METOPROLOL SUCCINATE 50 MG/1
50 TABLET, EXTENDED RELEASE ORAL DAILY
Qty: 90 TABLET | Refills: 0 | Status: SHIPPED | OUTPATIENT
Start: 2023-11-17 | End: 2024-02-20

## 2023-11-21 ENCOUNTER — OFFICE VISIT (OUTPATIENT)
Dept: PAIN MEDICINE | Facility: CLINIC | Age: 68
End: 2023-11-21
Payer: MEDICARE

## 2023-11-21 DIAGNOSIS — M51.36 DEGENERATIVE DISC DISEASE, LUMBAR: ICD-10-CM

## 2023-11-21 DIAGNOSIS — M51.27 OTHER INTERVERTEBRAL DISC DISPLACEMENT, LUMBOSACRAL REGION: ICD-10-CM

## 2023-11-21 DIAGNOSIS — I71.40 ABDOMINAL ANEURYSM (CMS-HCC): ICD-10-CM

## 2023-11-21 DIAGNOSIS — M51.26 HERNIATED NUCLEUS PULPOSUS, L4-5: Primary | ICD-10-CM

## 2023-11-21 PROCEDURE — 1160F RVW MEDS BY RX/DR IN RCRD: CPT | Performed by: PHYSICAL MEDICINE & REHABILITATION

## 2023-11-21 PROCEDURE — 3048F LDL-C <100 MG/DL: CPT | Performed by: PHYSICAL MEDICINE & REHABILITATION

## 2023-11-21 PROCEDURE — 3044F HG A1C LEVEL LT 7.0%: CPT | Performed by: PHYSICAL MEDICINE & REHABILITATION

## 2023-11-21 PROCEDURE — 3060F POS MICROALBUMINURIA REV: CPT | Performed by: PHYSICAL MEDICINE & REHABILITATION

## 2023-11-21 PROCEDURE — 1159F MED LIST DOCD IN RCRD: CPT | Performed by: PHYSICAL MEDICINE & REHABILITATION

## 2023-11-21 PROCEDURE — 99214 OFFICE O/P EST MOD 30 MIN: CPT | Performed by: PHYSICAL MEDICINE & REHABILITATION

## 2023-11-21 RX ORDER — NALOXONE HYDROCHLORIDE 4 MG/.1ML
4 SPRAY NASAL AS NEEDED
Qty: 2 EACH | Refills: 0 | Status: SHIPPED | OUTPATIENT
Start: 2023-11-21

## 2023-11-21 RX ORDER — HYDROCODONE BITARTRATE AND ACETAMINOPHEN 7.5; 325 MG/1; MG/1
1 TABLET ORAL EVERY 8 HOURS PRN
Qty: 84 TABLET | Refills: 0 | Status: SHIPPED | OUTPATIENT
Start: 2023-11-21 | End: 2023-12-19

## 2023-11-21 RX ORDER — HYDROCODONE BITARTRATE AND ACETAMINOPHEN 7.5; 325 MG/1; MG/1
1 TABLET ORAL EVERY 8 HOURS PRN
Qty: 84 TABLET | Refills: 0 | Status: SHIPPED | OUTPATIENT
Start: 2023-12-19 | End: 2024-01-16 | Stop reason: ALTCHOICE

## 2023-11-21 NOTE — PROGRESS NOTES
"Chief complaint  Back and lower limbs pain     History  Ms De Jesus is back for visit. She had the xray for L spine and found AAA , I referred her for surgery and saw surgeon and will follow up in April . She is following medical advice. She if little depressed and feels like \"walking time bomb\"  Still have back pain and tramadol is not helping   Wants to up the meds again   The pain in the back is deep achy worse in the mid back area.  This is associated with tight muscle bands.  This limits the range of motion of the lumbar spine mainly in forward flexion.  The pain in the back is radiating around the side on the lateral aspect of the   thighs going down toward the lateral aspect of the legs into the lateral malleosli toward the lateral aspect of the feet towards the big toes.    This pain down to the lower limbs is more of a burning tingling sensation.  The pain in the   lower limbs worsens with bending forward or with any lifting, it improves with laying on the side and resting.  This is similar to the associated pain in the middle to lower back.  Denied any bowel or bladder incontinence.  With the worst pain there is tendency to catch the toe especially on carpeted area.  This occurs mostly when tired and toward the end of the day.       Pain level without medication is 8/10 , with the medication pain level 4/10. Bc of hte aggrvatio n    The pain meds are helping control the pain and improving Activities of Daily living and quality of life and quality of sleep.    opioids treatment agreement 2023  Oarrs pulled and scanned in the chart  no concerns  last urine toxicology testing earlier this year and it was compliant we will repeat  Xray updated L spine   ORT Score is  0  Pain pathology and pain generators L spine   Modalities tried injection, surgery, physical therapy, TENS unit, nonsteroidal anti-inflammatory medication       Denied any fever or chills. No weight loss and no night sweats. No cough or sputum " production. No diarrhea   The constipation has been responding to fibers and over the counter medications.     No bladder and bowel incontinence and no other changes in bladder and bowel. No skin changes.  Reports tiredness and fatigability only if the pain is not controlled.   Denied opioids diversion and abuse and denies alcoholism. Denies overuse of the pain medications.    The control of the pain with the pain medications is helping the control of the symptoms and allowing the function and activities of daily living, enjoyment of life, improving the quality of life and sleep with less interruption by the pain. The goal is symptomatic control of the nonmalignant chronic pain and not to repair the permanent damage in the tissues inducing the chronic pain conditions. We are aiming to shift the focus from the nonmalignant chronic pain to other aspects of life by symptomatically treating this chronic pain. If this pain is not treated it will lead to major morbidity and it is also associated with increased risks of mortality. The patient understands those very clearly and also understand high risks of morbidity and mortality if not strictly adherent to the treatment recommendations and reporting any associated side effects. Also patient understand the full responsibility associated with these medications to avoid abuse or overuse or any use of these medications for anything besides treating the patient's own chronic pain and nothing else under any circumstances.        Physical examination  Awake, alert and oriented for time place and persons   declined Chaperone for the visit and was adequately  draped for the exam.      There is decreased sensory to light touch on the lateral aspects of the thighs and around the   knee going down to the lateral aspect of the legs to the   lateral malleoli into the dorsum of the feet..    Deep tendon reflexes is present for the patellar tendons bilaterally.  Achilles reflexes are  present bilaterally and symmetric.    Medial Hamstrings reflex is decreased bilaterally  Plantar cutaneous are downgoing.  Ankle dorsiflexion is 5/5 bilaterally.  Plantar flexion of the ankles are 5/5 bilaterally.  Big toe extension is 4/5 bilaterally  Negative Tinel's sign over the right peroneal nerve at the fibular neck.  Mann sign for axial loading and global rotation are negative.  No aberrant pain behavior.     Diagnosis  Problem List Items Addressed This Visit       Degenerative disc disease, lumbar    Relevant Medications    HYDROcodone-acetaminophen (Norco) 7.5-325 mg tablet    HYDROcodone-acetaminophen (Norco) 7.5-325 mg tablet (Start on 12/19/2023)    naloxone (Narcan) 4 mg/0.1 mL nasal spray    Herniated nucleus pulposus, L4-5 - Primary    Relevant Medications    HYDROcodone-acetaminophen (Norco) 7.5-325 mg tablet    HYDROcodone-acetaminophen (Norco) 7.5-325 mg tablet (Start on 12/19/2023)    naloxone (Narcan) 4 mg/0.1 mL nasal spray    Other intervertebral disc displacement, lumbosacral region    Relevant Medications    HYDROcodone-acetaminophen (Norco) 7.5-325 mg tablet    HYDROcodone-acetaminophen (Norco) 7.5-325 mg tablet (Start on 12/19/2023)    naloxone (Narcan) 4 mg/0.1 mL nasal spray    Abdominal aneurysm (CMS/HCC)    Relevant Medications    HYDROcodone-acetaminophen (Norco) 7.5-325 mg tablet    HYDROcodone-acetaminophen (Norco) 7.5-325 mg tablet (Start on 12/19/2023)    naloxone (Narcan) 4 mg/0.1 mL nasal spray        Plan  Reviewed the pain generators.  Went over the types of pain with neuropathic and nociceptive and different pathologies and therapeutic modalities. Discussed the mechanism of action of interventions from acupuncture, physical therapy , regular exercises, injections, botox, spinal cord stimulation, and role of surgery     Went over pathology of the intervertebral disc displacement and the anatomical relation to the Nerve roots and relation to the radicular symptoms. Went over  treatment modalities with conservative treatment including acupuncture   and epidural steroid injection with fluoroscopy guidance and last resort of surgery    Based on the above findings and the clinical response to the opioids medications and improvement of the activities of daily living, sleep, and work performance. We made this complex decision to continue the opioids therapy in light of the evidence of the patient's responsibility in using the pain medications as prescribed for the nonmalignant chronic pain condition. We discussed about the use of the pain medications to treat the symptoms of chronic nonmalignant pain and we are not trying the repair the permanent damage in the tissues, rather we are trying to control the symptoms induced by the permanent damage to the tissues inducing the chronic pain condition and resulting disability. I explained the difference and discussed it with the patient and stressed the importance of knowing the difference especially because of the potential side effects and the potential addicting effect and habit forming nature of the dangerous drugs we are using to treat the symptoms of the chronic pain.      We discussed that we are prescribing the medications on good manas and legitimate medical reason.     We reviewed the side effects and precautions of opioids prescriptions as discussed in the opioids treatment agreement.    realizes the interaction between the therapeutic classes including the respiratory depression and potential death     Random drug testing twice in 6 months we will submit     Stop tramadol and try hydrocodone. Will not have injection at this time until she clears the AAA she understands.   Avoid Nsaids and Discussed about NSAIDS and I explained about the opioids sparing effect to allow keeping the opioids dose at minimal effective dose.   I went over the potential side effects of the NSAIDS on the gastrointestinal, renal and cardiovascular systems.      I  detailed the side effects from the acetaminophen in the medication and made aware of those. I also explained about the cumulative effects on the organs and mainly the liver.     Given the opioids therapy , we discussed about the risk for accidental over dose on the pain medications, either for patient or other household. I went over the mechanism of action and mode of use of the Naloxone according to the  recommendations. I will provide a prescription for a kit.     Follow-up 8 weeks or earlier if needed     The level of clinical decision making in this office visit,  is high, given the high risks of complications with the morbidity and mortality due to the fact that acute and chronic pain may pose a threat to life and bodily function, if under treated, poorly treated, or with failure to maintain adequate treatment and timely medical follow up. Additionally over treatment has its own set of complications including overdosing on the pain medications and also the habit forming potentials with the use of the medications used to treat chronic painful conditions including therapeutic classes classified as dangerous medications. Given the serious and fluctuating nature of pain level and instensity with extensive consideration for whenever pain changes, there is always the risk of prolonged functional impairment requiring close patient monitoring with regular assessments and reassessments and high level medical decision making at every office visit. The amount and complexity of data reviewed is high given the patient clinical presentation, labs,  data, radiology reports, and other tests as discussed during office visits. Pertinent data whether positive or negative were taken in consideration in the process of making this high level medical decision.

## 2023-11-28 ENCOUNTER — APPOINTMENT (OUTPATIENT)
Dept: VASCULAR SURGERY | Facility: CLINIC | Age: 68
End: 2023-11-28
Payer: MEDICARE

## 2024-01-05 ENCOUNTER — LAB (OUTPATIENT)
Dept: LAB | Facility: LAB | Age: 69
End: 2024-01-05
Payer: MEDICARE

## 2024-01-05 ENCOUNTER — TELEPHONE (OUTPATIENT)
Dept: UROLOGY | Facility: CLINIC | Age: 69
End: 2024-01-05
Payer: MEDICARE

## 2024-01-05 DIAGNOSIS — R30.0 DYSURIA: Primary | ICD-10-CM

## 2024-01-05 DIAGNOSIS — R30.0 DYSURIA: ICD-10-CM

## 2024-01-05 DIAGNOSIS — N39.0 RECURRENT UTI: Primary | ICD-10-CM

## 2024-01-05 LAB
MUCOUS THREADS #/AREA URNS AUTO: ABNORMAL /LPF
RBC #/AREA URNS AUTO: ABNORMAL /HPF
SQUAMOUS #/AREA URNS AUTO: ABNORMAL /HPF
WBC #/AREA URNS AUTO: ABNORMAL /HPF

## 2024-01-05 PROCEDURE — 81001 URINALYSIS AUTO W/SCOPE: CPT

## 2024-01-05 PROCEDURE — 87186 SC STD MICRODIL/AGAR DIL: CPT

## 2024-01-05 PROCEDURE — 87086 URINE CULTURE/COLONY COUNT: CPT

## 2024-01-05 RX ORDER — NITROFURANTOIN 25; 75 MG/1; MG/1
100 CAPSULE ORAL 2 TIMES DAILY
Qty: 14 CAPSULE | Refills: 0 | Status: SHIPPED | OUTPATIENT
Start: 2024-01-05 | End: 2024-01-12

## 2024-01-05 NOTE — TELEPHONE ENCOUNTER
Detailed message left on pt VM   [FreeTextEntry1] : annual exam\par  [de-identified] : annual exam\par \par No sig PMHx\par \par

## 2024-01-05 NOTE — TELEPHONE ENCOUNTER
Pt left message asking for an order to be placed in her chart so she can drop off a urine as she having UTI Sx. Orders placed and pt will be informed. Please advise on treatment, thanks.

## 2024-01-08 ENCOUNTER — TELEPHONE (OUTPATIENT)
Dept: UROLOGY | Facility: CLINIC | Age: 69
End: 2024-01-08
Payer: MEDICARE

## 2024-01-08 LAB — BACTERIA UR CULT: ABNORMAL

## 2024-01-08 NOTE — TELEPHONE ENCOUNTER
----- Message from LINCOLN Lomeli sent at 1/8/2024 10:30 AM EST -----  Please finish macrobid, sensitive, ty  ----- Message -----  From: Lab, Background User  Sent: 1/5/2024  10:34 PM EST  To: LINCOLN Lomeli

## 2024-01-16 ENCOUNTER — OFFICE VISIT (OUTPATIENT)
Dept: PAIN MEDICINE | Facility: CLINIC | Age: 69
End: 2024-01-16
Payer: MEDICARE

## 2024-01-16 DIAGNOSIS — M54.16 CHRONIC RADICULAR LUMBAR PAIN: ICD-10-CM

## 2024-01-16 DIAGNOSIS — M54.16 RIGHT LUMBAR RADICULOPATHY: ICD-10-CM

## 2024-01-16 DIAGNOSIS — M51.26 HERNIATED NUCLEUS PULPOSUS, L4-5: Primary | ICD-10-CM

## 2024-01-16 DIAGNOSIS — G89.29 CHRONIC RADICULAR LUMBAR PAIN: ICD-10-CM

## 2024-01-16 DIAGNOSIS — M51.36 DEGENERATIVE DISC DISEASE, LUMBAR: ICD-10-CM

## 2024-01-16 PROCEDURE — 1160F RVW MEDS BY RX/DR IN RCRD: CPT | Performed by: PHYSICAL MEDICINE & REHABILITATION

## 2024-01-16 PROCEDURE — 99214 OFFICE O/P EST MOD 30 MIN: CPT | Performed by: PHYSICAL MEDICINE & REHABILITATION

## 2024-01-16 RX ORDER — GABAPENTIN 300 MG/1
300 CAPSULE ORAL 2 TIMES DAILY
Qty: 60 CAPSULE | Refills: 1 | Status: SHIPPED | OUTPATIENT
Start: 2024-01-16 | End: 2024-03-05 | Stop reason: SDUPTHER

## 2024-01-16 RX ORDER — HYDROCODONE BITARTRATE AND ACETAMINOPHEN 5; 325 MG/1; MG/1
1 TABLET ORAL EVERY 12 HOURS PRN
Qty: 56 TABLET | Refills: 0 | Status: SHIPPED | OUTPATIENT
Start: 2024-02-13 | End: 2024-03-05 | Stop reason: SDUPTHER

## 2024-01-16 RX ORDER — HYDROCODONE BITARTRATE AND ACETAMINOPHEN 5; 325 MG/1; MG/1
1 TABLET ORAL EVERY 12 HOURS PRN
Qty: 56 TABLET | Refills: 0 | Status: SHIPPED | OUTPATIENT
Start: 2024-01-16 | End: 2024-02-13

## 2024-01-16 RX ORDER — CYCLOBENZAPRINE HCL 5 MG
5 TABLET ORAL NIGHTLY
Qty: 30 TABLET | Refills: 1 | Status: SHIPPED | OUTPATIENT
Start: 2024-01-16 | End: 2024-02-15

## 2024-01-16 NOTE — PROGRESS NOTES
Chief complaint  Back and lower limb pain     History  Ms De Jesus is back for a visit  Hydrocodone 7.5 mg too strong cut back to 5 mg   Continues with pain to back and leg  The pain in the back is deep achy worse in the mid back area.  This is associated with tight muscle bands.  This limits the range of motion of the lumbar spine mainly in forward flexion.  The pain in the back is radiating around the side on the lateral aspect of the right thigh going down toward the lateral aspect of the right leg into the lateral malleolus toward the lateral aspect of the foot towards the big toe.    This pain down to the right lower limb is more of a burning tingling sensation.  The pain in the right lower limb worsens with bending forward or with any lifting, it improves with laying on the side and resting.  This is similar to the associated pain in the middle to lower back.  Denied any bowel or bladder incontinence.  With the worst pain there is tendency to catch the toe especially on carpeted area.  This occurs mostly when tired and toward the end of the day.    The skin is intact with no breakdown.  No vesicles.        Pain level without medication is 8/10 , with the medication pain level 4/10.     The pain meds are helping control the pain and improving Activities of Daily living and quality of life and quality of sleep.    opioids treatment agreement Jan 2024  Oarrs pulled and scanned in the chart  no concerns  last urine toxicology testing earlier this year and it was compliant we will repeat  Xray updated spine   ORT Score is  0  Pain pathology and pain generators spine   Modalities tried injection, surgery, physical therapy, TENS unit, nonsteroidal anti-inflammatory medication       Denied any fever or chills. No weight loss and no night sweats. No cough or sputum production. No diarrhea   The constipation has been responding to fibers and over the counter medications.     No bladder and bowel incontinence and no other  changes in bladder and bowel. No skin changes.  Reports tiredness and fatigability only if the pain is not controlled.   Denied opioids diversion and abuse and denies alcoholism. Denies overuse of the pain medications.    The control of the pain with the pain medications is helping the control of the symptoms and allowing the function and activities of daily living, enjoyment of life, improving the quality of life and sleep with less interruption by the pain. The goal is symptomatic control of the nonmalignant chronic pain and not to repair the permanent damage in the tissues inducing the chronic pain conditions. We are aiming to shift the focus from the nonmalignant chronic pain to other aspects of life by symptomatically treating this chronic pain. If this pain is not treated it will lead to major morbidity and it is also associated with increased risks of mortality. The patient understands those very clearly and also understand high risks of morbidity and mortality if not strictly adherent to the treatment recommendations and reporting any associated side effects. Also patient understand the full responsibility associated with these medications to avoid abuse or overuse or any use of these medications for anything besides treating the patient's own chronic pain and nothing else under any circumstances.        Physical examination  Awake, alert and oriented for time place and persons   declined Chaperone for the visit and was adequately  draped for the exam.      Examination of the lumbar spine showed tight muscle bands in the mid and lower back area, this is more pronounced on the right compared to the left.  Additionally, this is inducing mild reversal of the lumbar lordosis with functional scoliosis with right-sided concavity.  This scoliosis corrected with  bending of the lumbar spine.     Straight leg raising increased the pain in the back and down the lateral aspect of the right knee onto the lateral leg to  the lateral malleolus and foot.     There is decreased sensory to light touch on the lateral aspect of the thigh and around the right knee going down to the lateral aspect of the leg to the right lateral malleolus into the dorsum of the foot..    Deep tendon reflexes is present for the patellar tendons bilaterally.  Achilles reflexes are present bilaterally and symmetric.    Medial Hamstrings reflex is decreased on the right compared to the left side.  Plantar cutaneous are downgoing.  Ankle dorsiflexion is 5/5 bilaterally.  Plantar flexion of the ankles are 5/5 bilaterally.  Big toe extension is 4/5 on the right compared to 5/5 on the left side.    Negative Tinel's sign over the right peroneal nerve at the fibular neck.  Mann sign for axial loading and global rotation are negative.  No aberrant pain behavior.           Diagnosis  Problem List Items Addressed This Visit       Degenerative disc disease, lumbar    Relevant Medications    HYDROcodone-acetaminophen (Norco) 5-325 mg tablet    HYDROcodone-acetaminophen (Norco) 5-325 mg tablet (Start on 2/13/2024)    gabapentin (Neurontin) 300 mg capsule    cyclobenzaprine (Flexeril) 5 mg tablet    Herniated nucleus pulposus, L4-5 - Primary    Relevant Medications    HYDROcodone-acetaminophen (Norco) 5-325 mg tablet    HYDROcodone-acetaminophen (Norco) 5-325 mg tablet (Start on 2/13/2024)    gabapentin (Neurontin) 300 mg capsule    cyclobenzaprine (Flexeril) 5 mg tablet    Right lumbar radiculopathy    Relevant Medications    HYDROcodone-acetaminophen (Norco) 5-325 mg tablet    HYDROcodone-acetaminophen (Norco) 5-325 mg tablet (Start on 2/13/2024)    gabapentin (Neurontin) 300 mg capsule    cyclobenzaprine (Flexeril) 5 mg tablet    Chronic radicular lumbar pain    Relevant Medications    HYDROcodone-acetaminophen (Norco) 5-325 mg tablet    HYDROcodone-acetaminophen (Norco) 5-325 mg tablet (Start on 2/13/2024)    gabapentin (Neurontin) 300 mg capsule    cyclobenzaprine  (Flexeril) 5 mg tablet        Plan  Reviewed the pain generators.  Went over the types of pain with neuropathic and nociceptive and different pathologies and therapeutic modalities. Discussed the mechanism of action of interventions from acupuncture, physical therapy , regular exercises, injections, botox, spinal cord stimulation, and role of surgery     Went over pathology of the intervertebral disc displacement and the anatomical relation to the Nerve roots and relation to the radicular symptoms. Went over treatment modalities with conservative treatment including acupuncture   and epidural steroid injection with fluoroscopy guidance and last resort of surgery    Based on the above findings and the clinical response to the opioids medications and improvement of the activities of daily living, sleep, and work performance. We made this complex decision to continue the opioids therapy in light of the evidence of the patient's responsibility in using the pain medications as prescribed for the nonmalignant chronic pain condition. We discussed about the use of the pain medications to treat the symptoms of chronic nonmalignant pain and we are not trying the repair the permanent damage in the tissues, rather we are trying to control the symptoms induced by the permanent damage to the tissues inducing the chronic pain condition and resulting disability. I explained the difference and discussed it with the patient and stressed the importance of knowing the difference especially because of the potential side effects and the potential addicting effect and habit forming nature of the dangerous drugs we are using to treat the symptoms of the chronic pain.      We discussed that we are prescribing the medications on good manas and legitimate medical reason.     We reviewed the side effects and precautions of opioids prescriptions as discussed in the opioids treatment agreement.    realizes the interaction between the therapeutic  classes including the respiratory depression and potential death     Random drug testing twice in 6 months we will submit     Hydrocodone 5 bid   has a narcan at home know how and when to use it if needed.   Continue with gabapenting 300 mg bid  Cyclobenzaprine at night     Discussed about NSAIDS and I explained about the opioids sparing effect to allow keeping the opioids dose at minimal effective dose.   I went over the potential side effects of the NSAIDS on the gastrointestinal, renal and cardiovascular systems.      I detailed the side effects from the acetaminophen in the medication and made aware of those. I also explained about the cumulative effects on the organs and mainly the liver.     Given the opioids therapy , we discussed about the risk for accidental over dose on the pain medications, either for patient or other household. I went over the mechanism of action and mode of use of the Naloxone according to the  recommendations. I will provide a prescription for a kit.     Follow-up 8 weeks or earlier if needed     The level of clinical decision making in this office visit,  is high, given the high risks of complications with the morbidity and mortality due to the fact that acute and chronic pain may pose a threat to life and bodily function, if under treated, poorly treated, or with failure to maintain adequate treatment and timely medical follow up. Additionally over treatment has its own set of complications including overdosing on the pain medications and also the habit forming potentials with the use of the medications used to treat chronic painful conditions including therapeutic classes classified as dangerous medications. Given the serious and fluctuating nature of pain level and instensity with extensive consideration for whenever pain changes, there is always the risk of prolonged functional impairment requiring close patient monitoring with regular assessments and reassessments and  high level medical decision making at every office visit. The amount and complexity of data reviewed is high given the patient clinical presentation, labs,  data, radiology reports, and other tests as discussed during office visits. Pertinent data whether positive or negative were taken in consideration in the process of making this high level medical decision.

## 2024-02-11 DIAGNOSIS — I10 HYPERTENSION, UNSPECIFIED TYPE: Primary | ICD-10-CM

## 2024-02-20 RX ORDER — METOPROLOL SUCCINATE 50 MG/1
50 TABLET, EXTENDED RELEASE ORAL DAILY
Qty: 90 TABLET | Refills: 3 | Status: SHIPPED | OUTPATIENT
Start: 2024-02-20 | End: 2024-04-23

## 2024-02-23 ENCOUNTER — TELEPHONE (OUTPATIENT)
Dept: CARDIOLOGY | Facility: HOSPITAL | Age: 69
End: 2024-02-23
Payer: MEDICARE

## 2024-02-27 ENCOUNTER — LAB (OUTPATIENT)
Dept: LAB | Facility: LAB | Age: 69
End: 2024-02-27
Payer: MEDICARE

## 2024-02-27 DIAGNOSIS — Z00.00 ENCOUNTER FOR GENERAL ADULT MEDICAL EXAMINATION WITHOUT ABNORMAL FINDINGS: Primary | ICD-10-CM

## 2024-02-27 DIAGNOSIS — I71.40 ABDOMINAL AORTIC ANEURYSM (AAA) WITHOUT RUPTURE, UNSPECIFIED PART (CMS-HCC): ICD-10-CM

## 2024-02-27 DIAGNOSIS — E11.9 TYPE 2 DIABETES MELLITUS WITHOUT COMPLICATIONS (MULTI): ICD-10-CM

## 2024-02-27 DIAGNOSIS — I10 ESSENTIAL (PRIMARY) HYPERTENSION: ICD-10-CM

## 2024-02-27 LAB
ALBUMIN SERPL BCP-MCNC: 4.2 G/DL (ref 3.4–5)
ALP SERPL-CCNC: 85 U/L (ref 33–136)
ALT SERPL W P-5'-P-CCNC: 16 U/L (ref 7–45)
ANION GAP SERPL CALC-SCNC: 15 MMOL/L (ref 10–20)
AST SERPL W P-5'-P-CCNC: 19 U/L (ref 9–39)
BASOPHILS # BLD AUTO: 0.03 X10*3/UL (ref 0–0.1)
BASOPHILS NFR BLD AUTO: 0.5 %
BILIRUB SERPL-MCNC: 0.5 MG/DL (ref 0–1.2)
BUN SERPL-MCNC: 23 MG/DL (ref 6–23)
CALCIUM SERPL-MCNC: 9.7 MG/DL (ref 8.6–10.6)
CHLORIDE SERPL-SCNC: 103 MMOL/L (ref 98–107)
CHOLEST SERPL-MCNC: 158 MG/DL (ref 0–199)
CHOLESTEROL/HDL RATIO: 3
CO2 SERPL-SCNC: 28 MMOL/L (ref 21–32)
CREAT SERPL-MCNC: 1.19 MG/DL (ref 0.5–1.05)
EGFRCR SERPLBLD CKD-EPI 2021: 50 ML/MIN/1.73M*2
EOSINOPHIL # BLD AUTO: 0.23 X10*3/UL (ref 0–0.7)
EOSINOPHIL NFR BLD AUTO: 3.9 %
ERYTHROCYTE [DISTWIDTH] IN BLOOD BY AUTOMATED COUNT: 15.1 % (ref 11.5–14.5)
EST. AVERAGE GLUCOSE BLD GHB EST-MCNC: 137 MG/DL
GLUCOSE SERPL-MCNC: 106 MG/DL (ref 74–99)
HBA1C MFR BLD: 6.4 %
HCT VFR BLD AUTO: 46 % (ref 36–46)
HDLC SERPL-MCNC: 53.3 MG/DL
HGB BLD-MCNC: 15.2 G/DL (ref 12–16)
IMM GRANULOCYTES # BLD AUTO: 0.03 X10*3/UL (ref 0–0.7)
IMM GRANULOCYTES NFR BLD AUTO: 0.5 % (ref 0–0.9)
LDLC SERPL CALC-MCNC: 62 MG/DL
LYMPHOCYTES # BLD AUTO: 2.14 X10*3/UL (ref 1.2–4.8)
LYMPHOCYTES NFR BLD AUTO: 36 %
MAGNESIUM SERPL-MCNC: 2.04 MG/DL (ref 1.6–2.4)
MCH RBC QN AUTO: 31.9 PG (ref 26–34)
MCHC RBC AUTO-ENTMCNC: 33 G/DL (ref 32–36)
MCV RBC AUTO: 96 FL (ref 80–100)
MONOCYTES # BLD AUTO: 0.5 X10*3/UL (ref 0.1–1)
MONOCYTES NFR BLD AUTO: 8.4 %
NEUTROPHILS # BLD AUTO: 3.01 X10*3/UL (ref 1.2–7.7)
NEUTROPHILS NFR BLD AUTO: 50.7 %
NON HDL CHOLESTEROL: 105 MG/DL (ref 0–149)
NRBC BLD-RTO: 0 /100 WBCS (ref 0–0)
PLATELET # BLD AUTO: 208 X10*3/UL (ref 150–450)
POTASSIUM SERPL-SCNC: 4.7 MMOL/L (ref 3.5–5.3)
PROT SERPL-MCNC: 7.1 G/DL (ref 6.4–8.2)
RBC # BLD AUTO: 4.77 X10*6/UL (ref 4–5.2)
SODIUM SERPL-SCNC: 141 MMOL/L (ref 136–145)
TRIGL SERPL-MCNC: 214 MG/DL (ref 0–149)
VLDL: 43 MG/DL (ref 0–40)
WBC # BLD AUTO: 5.9 X10*3/UL (ref 4.4–11.3)

## 2024-02-27 PROCEDURE — 83036 HEMOGLOBIN GLYCOSYLATED A1C: CPT

## 2024-02-27 PROCEDURE — 36415 COLL VENOUS BLD VENIPUNCTURE: CPT

## 2024-02-27 PROCEDURE — 83735 ASSAY OF MAGNESIUM: CPT

## 2024-02-27 PROCEDURE — 80053 COMPREHEN METABOLIC PANEL: CPT

## 2024-02-27 PROCEDURE — 80061 LIPID PANEL: CPT

## 2024-02-27 PROCEDURE — 85025 COMPLETE CBC W/AUTO DIFF WBC: CPT

## 2024-02-28 ENCOUNTER — TELEPHONE (OUTPATIENT)
Dept: CARDIOLOGY | Facility: HOSPITAL | Age: 69
End: 2024-02-28
Payer: MEDICARE

## 2024-03-01 NOTE — TELEPHONE ENCOUNTER
I spoke with patient.  LE edema has slightly improved with the following medication changes by her PCP:    Decrease Amlodipine to 5 mg daily  Stop hydrochlorothiazide  Begin furosemide 20 mg daily  Begin Potassium supplement 10 mEq daily  Increase Metoprolol succinate to 100 mg daily    Patient has follwoup with us in April.  She knows to call for any inteirm concerns.

## 2024-03-05 ENCOUNTER — OFFICE VISIT (OUTPATIENT)
Dept: PAIN MEDICINE | Facility: CLINIC | Age: 69
End: 2024-03-05
Payer: MEDICARE

## 2024-03-05 DIAGNOSIS — M54.16 RIGHT LUMBAR RADICULOPATHY: ICD-10-CM

## 2024-03-05 DIAGNOSIS — M51.26 HERNIATED NUCLEUS PULPOSUS, L4-5: ICD-10-CM

## 2024-03-05 DIAGNOSIS — M51.27 OTHER INTERVERTEBRAL DISC DISPLACEMENT, LUMBOSACRAL REGION: ICD-10-CM

## 2024-03-05 DIAGNOSIS — M51.36 DEGENERATIVE DISC DISEASE, LUMBAR: ICD-10-CM

## 2024-03-05 DIAGNOSIS — M54.16 CHRONIC RADICULAR LUMBAR PAIN: ICD-10-CM

## 2024-03-05 DIAGNOSIS — Z79.891 LONG TERM (CURRENT) USE OF OPIATE ANALGESIC: Primary | ICD-10-CM

## 2024-03-05 DIAGNOSIS — G89.29 CHRONIC RADICULAR LUMBAR PAIN: ICD-10-CM

## 2024-03-05 PROCEDURE — 3048F LDL-C <100 MG/DL: CPT | Performed by: PHYSICAL MEDICINE & REHABILITATION

## 2024-03-05 PROCEDURE — 99214 OFFICE O/P EST MOD 30 MIN: CPT | Performed by: PHYSICAL MEDICINE & REHABILITATION

## 2024-03-05 PROCEDURE — 3044F HG A1C LEVEL LT 7.0%: CPT | Performed by: PHYSICAL MEDICINE & REHABILITATION

## 2024-03-05 RX ORDER — GABAPENTIN 300 MG/1
300 CAPSULE ORAL 2 TIMES DAILY
Qty: 60 CAPSULE | Refills: 1 | Status: SHIPPED | OUTPATIENT
Start: 2024-03-05 | End: 2024-05-13

## 2024-03-05 RX ORDER — HYDROCODONE BITARTRATE AND ACETAMINOPHEN 5; 325 MG/1; MG/1
1 TABLET ORAL EVERY 12 HOURS PRN
Qty: 56 TABLET | Refills: 0 | Status: SHIPPED | OUTPATIENT
Start: 2024-04-02 | End: 2024-04-23 | Stop reason: SDUPTHER

## 2024-03-05 RX ORDER — HYDROCODONE BITARTRATE AND ACETAMINOPHEN 5; 325 MG/1; MG/1
1 TABLET ORAL EVERY 12 HOURS PRN
Qty: 56 TABLET | Refills: 0 | Status: SHIPPED | OUTPATIENT
Start: 2024-03-05 | End: 2024-04-02

## 2024-03-05 RX ORDER — CYCLOBENZAPRINE HCL 5 MG
5 TABLET ORAL 2 TIMES DAILY PRN
Qty: 60 TABLET | Refills: 2 | Status: SHIPPED | OUTPATIENT
Start: 2024-03-05 | End: 2024-03-15

## 2024-03-05 NOTE — PROGRESS NOTES
Chief complaint  Back and Right leg    History  Ms De Jesus is back for visit  Pain is controlled but not gone  For agg DAYSI helped. Her last DAYSI is still holding at over 80% relief  The pain in the back is deep achy worse in the mid back area.  This is associated with tight muscle bands.  This limits the range of motion of the lumbar spine mainly in forward flexion.  The pain in the back is radiating around the side on the lateral aspect of the right thigh going down toward the lateral aspect of the right leg into the lateral malleolus toward the lateral aspect of the foot towards the big toe.    This pain down to the right lower limb is more of a burning tingling sensation.  The pain in the right lower limb worsens with bending forward or with any lifting, it improves with laying on the side and resting.  This is similar to the associated pain in the middle to lower back.  Denied any bowel or bladder incontinence.  With the worst pain there is tendency to catch the toe especially on carpeted area.  This occurs mostly when tired and toward the end of the day.    The skin is intact with no breakdown.  No vesicles.        Pain level without medication is 6 t7/10 , with the medication pain level 2/10.     The pain meds are helping control the pain and improving Activities of Daily living and quality of life and quality of sleep.    opioids treatment agreement Jan 2024  PDI (Pain Disability Index) score: 44  Oarrs pulled and scanned in the chart  no concerns  last urine toxicology testing earlier this year and it was compliant we will repeat  Xray updated spine   ORT Score is  0  Pain pathology and pain generators spine   Modalities tried injection, surgery, physical therapy, TENS unit, nonsteroidal anti-inflammatory medication       Denied any fever or chills. No weight loss and no night sweats. No cough or sputum production. No diarrhea   The constipation has been responding to fibers and over the counter medications.      No bladder and bowel incontinence and no other changes in bladder and bowel. No skin changes.  Reports tiredness and fatigability only if the pain is not controlled.   Denied opioids diversion and abuse and denies alcoholism. Denies overuse of the pain medications.    The control of the pain with the pain medications is helping the control of the symptoms and allowing the function and activities of daily living, enjoyment of life, improving the quality of life and sleep with less interruption by the pain. The goal is symptomatic control of the nonmalignant chronic pain and not to repair the permanent damage in the tissues inducing the chronic pain conditions. We are aiming to shift the focus from the nonmalignant chronic pain to other aspects of life by symptomatically treating this chronic pain. If this pain is not treated it will lead to major morbidity and it is also associated with increased risks of mortality. The patient understands those very clearly and also understand high risks of morbidity and mortality if not strictly adherent to the treatment recommendations and reporting any associated side effects. Also patient understand the full responsibility associated with these medications to avoid abuse or overuse or any use of these medications for anything besides treating the patient's own chronic pain and nothing else under any circumstances.        Physical examination  Awake, alert and oriented for time place and persons   declined Chaperone for the visit and was adequately  draped for the exam.    Mann negative   Pain inhibition of the hips Manual Muscle strength testing because of the pain at the lower back of and over SIJ. the endurance is decreased even though the initial resistance was 5/5 but could not keep beyond initial resistance.   plantar cutaneous reflex are down going bilaterally     Diagnosis  Problem List Items Addressed This Visit       Degenerative disc disease, lumbar    Relevant  Medications    HYDROcodone-acetaminophen (Norco) 5-325 mg tablet    gabapentin (Neurontin) 300 mg capsule    cyclobenzaprine (Flexeril) 5 mg tablet    HYDROcodone-acetaminophen (Norco) 5-325 mg tablet (Start on 4/2/2024)    Herniated nucleus pulposus, L4-5    Relevant Medications    HYDROcodone-acetaminophen (Norco) 5-325 mg tablet    gabapentin (Neurontin) 300 mg capsule    cyclobenzaprine (Flexeril) 5 mg tablet    HYDROcodone-acetaminophen (Norco) 5-325 mg tablet (Start on 4/2/2024)    Other intervertebral disc displacement, lumbosacral region    Relevant Medications    HYDROcodone-acetaminophen (Norco) 5-325 mg tablet    gabapentin (Neurontin) 300 mg capsule    cyclobenzaprine (Flexeril) 5 mg tablet    HYDROcodone-acetaminophen (Norco) 5-325 mg tablet (Start on 4/2/2024)    Right lumbar radiculopathy    Relevant Medications    HYDROcodone-acetaminophen (Norco) 5-325 mg tablet    gabapentin (Neurontin) 300 mg capsule    cyclobenzaprine (Flexeril) 5 mg tablet    HYDROcodone-acetaminophen (Norco) 5-325 mg tablet (Start on 4/2/2024)    Chronic radicular lumbar pain    Relevant Medications    HYDROcodone-acetaminophen (Norco) 5-325 mg tablet    gabapentin (Neurontin) 300 mg capsule    cyclobenzaprine (Flexeril) 5 mg tablet    HYDROcodone-acetaminophen (Norco) 5-325 mg tablet (Start on 4/2/2024)    Long term (current) use of opiate analgesic - Primary    Relevant Medications    HYDROcodone-acetaminophen (Norco) 5-325 mg tablet    gabapentin (Neurontin) 300 mg capsule    cyclobenzaprine (Flexeril) 5 mg tablet    HYDROcodone-acetaminophen (Norco) 5-325 mg tablet (Start on 4/2/2024)    Other Relevant Orders    Opiate/Opioid/Benzo Prescription Compliance        Plan  Reviewed the pain generators.  Went over the types of pain with neuropathic and nociceptive and different pathologies and therapeutic modalities. Discussed the mechanism of action of interventions from acupuncture, physical therapy , regular exercises,  injections, botox, spinal cord stimulation, and role of surgery     Went over pathology of the intervertebral disc displacement and the anatomical relation to the Nerve roots and relation to the radicular symptoms. Went over treatment modalities with conservative treatment including acupuncture   and epidural steroid injection with fluoroscopy guidance and last resort of surgery    Based on the above findings and the clinical response to the opioids medications and improvement of the activities of daily living, sleep, and work performance. We made this complex decision to continue the opioids therapy in light of the evidence of the patient's responsibility in using the pain medications as prescribed for the nonmalignant chronic pain condition. We discussed about the use of the pain medications to treat the symptoms of chronic nonmalignant pain and we are not trying the repair the permanent damage in the tissues, rather we are trying to control the symptoms induced by the permanent damage to the tissues inducing the chronic pain condition and resulting disability. I explained the difference and discussed it with the patient and stressed the importance of knowing the difference especially because of the potential side effects and the potential addicting effect and habit forming nature of the dangerous drugs we are using to treat the symptoms of the chronic pain.      We discussed that we are prescribing the medications on good manas and legitimate medical reason.     We reviewed the side effects and precautions of opioids prescriptions as discussed in the opioids treatment agreement.    realizes the interaction between the therapeutic classes including the respiratory depression and potential death     Random drug testing twice in 6 months we will submit     Hyrodoeond 5 mg bid   has a narcan at home know how and when to use it if needed.  Discussed about considering cut back on pain medications  Gabapentin      Discussed about NSAIDS and I explained about the opioids sparing effect to allow keeping the opioids dose at minimal effective dose.   I went over the potential side effects of the NSAIDS on the gastrointestinal, renal and cardiovascular systems.      I detailed the side effects from the acetaminophen in the medication and made aware of those. I also explained about the cumulative effects on the organs and mainly the liver.     Given the opioids therapy , we discussed about the risk for accidental over dose on the pain medications, either for patient or other household. I went over the mechanism of action and mode of use of the Naloxone according to the  recommendations. I will provide a prescription for a kit.     Follow-up 8 weeks or earlier if needed     The level of clinical decision making in this office visit,  is high, given the high risks of complications with the morbidity and mortality due to the fact that acute and chronic pain may pose a threat to life and bodily function, if under treated, poorly treated, or with failure to maintain adequate treatment and timely medical follow up. Additionally over treatment has its own set of complications including overdosing on the pain medications and also the habit forming potentials with the use of the medications used to treat chronic painful conditions including therapeutic classes classified as dangerous medications. Given the serious and fluctuating nature of pain level and instensity with extensive consideration for whenever pain changes, there is always the risk of prolonged functional impairment requiring close patient monitoring with regular assessments and reassessments and high level medical decision making at every office visit. The amount and complexity of data reviewed is high given the patient clinical presentation, labs,  data, radiology reports, and other tests as discussed during office visits. Pertinent data whether positive or  negative were taken in consideration in the process of making this high level medical decision.

## 2024-04-16 ENCOUNTER — HOSPITAL ENCOUNTER (OUTPATIENT)
Dept: RADIOLOGY | Facility: CLINIC | Age: 69
Discharge: HOME | End: 2024-04-16
Payer: MEDICARE

## 2024-04-16 VITALS — WEIGHT: 230 LBS | HEIGHT: 64 IN | BODY MASS INDEX: 39.27 KG/M2

## 2024-04-16 DIAGNOSIS — Z12.31 ENCOUNTER FOR SCREENING MAMMOGRAM FOR MALIGNANT NEOPLASM OF BREAST: ICD-10-CM

## 2024-04-16 DIAGNOSIS — I71.42 JUXTARENAL ABDOMINAL AORTIC ANEURYSM (AAA) WITHOUT RUPTURE (CMS-HCC): ICD-10-CM

## 2024-04-16 PROCEDURE — 74175 CTA ABDOMEN W/CONTRAST: CPT

## 2024-04-16 PROCEDURE — 77067 SCR MAMMO BI INCL CAD: CPT | Performed by: STUDENT IN AN ORGANIZED HEALTH CARE EDUCATION/TRAINING PROGRAM

## 2024-04-16 PROCEDURE — 2550000001 HC RX 255 CONTRASTS: Performed by: SURGERY

## 2024-04-16 PROCEDURE — 74175 CTA ABDOMEN W/CONTRAST: CPT | Performed by: RADIOLOGY

## 2024-04-16 PROCEDURE — 77063 BREAST TOMOSYNTHESIS BI: CPT | Performed by: STUDENT IN AN ORGANIZED HEALTH CARE EDUCATION/TRAINING PROGRAM

## 2024-04-16 PROCEDURE — 77067 SCR MAMMO BI INCL CAD: CPT

## 2024-04-16 RX ADMIN — IOHEXOL 75 ML: 350 INJECTION, SOLUTION INTRAVENOUS at 09:41

## 2024-04-18 ENCOUNTER — OFFICE VISIT (OUTPATIENT)
Dept: UROLOGY | Facility: CLINIC | Age: 69
End: 2024-04-18
Payer: MEDICARE

## 2024-04-18 VITALS
WEIGHT: 227 LBS | HEART RATE: 89 BPM | SYSTOLIC BLOOD PRESSURE: 159 MMHG | BODY MASS INDEX: 38.76 KG/M2 | DIASTOLIC BLOOD PRESSURE: 98 MMHG | HEIGHT: 64 IN

## 2024-04-18 DIAGNOSIS — N39.41 URGE INCONTINENCE: ICD-10-CM

## 2024-04-18 DIAGNOSIS — N39.0 RECURRENT UTI: Primary | ICD-10-CM

## 2024-04-18 DIAGNOSIS — R35.0 FREQUENCY OF URINATION: ICD-10-CM

## 2024-04-18 DIAGNOSIS — R35.1 NOCTURIA: ICD-10-CM

## 2024-04-18 LAB
POC APPEARANCE, URINE: CLEAR
POC BILIRUBIN, URINE: NEGATIVE
POC BLOOD, URINE: NEGATIVE
POC COLOR, URINE: YELLOW
POC GLUCOSE, URINE: NEGATIVE MG/DL
POC KETONES, URINE: NEGATIVE MG/DL
POC LEUKOCYTES, URINE: NEGATIVE
POC NITRITE,URINE: NEGATIVE
POC PH, URINE: 5.5 PH
POC PROTEIN, URINE: NEGATIVE MG/DL
POC SPECIFIC GRAVITY, URINE: 1.01
POC UROBILINOGEN, URINE: 0.2 EU/DL

## 2024-04-18 PROCEDURE — 4010F ACE/ARB THERAPY RXD/TAKEN: CPT | Performed by: NURSE PRACTITIONER

## 2024-04-18 PROCEDURE — 3044F HG A1C LEVEL LT 7.0%: CPT | Performed by: NURSE PRACTITIONER

## 2024-04-18 PROCEDURE — 3048F LDL-C <100 MG/DL: CPT | Performed by: NURSE PRACTITIONER

## 2024-04-18 PROCEDURE — 99213 OFFICE O/P EST LOW 20 MIN: CPT | Performed by: NURSE PRACTITIONER

## 2024-04-18 PROCEDURE — 3080F DIAST BP >= 90 MM HG: CPT | Performed by: NURSE PRACTITIONER

## 2024-04-18 PROCEDURE — 1159F MED LIST DOCD IN RCRD: CPT | Performed by: NURSE PRACTITIONER

## 2024-04-18 PROCEDURE — 3077F SYST BP >= 140 MM HG: CPT | Performed by: NURSE PRACTITIONER

## 2024-04-18 PROCEDURE — 81003 URINALYSIS AUTO W/O SCOPE: CPT | Performed by: NURSE PRACTITIONER

## 2024-04-18 RX ORDER — AMLODIPINE BESYLATE 5 MG/1
5 TABLET ORAL
COMMUNITY
Start: 2024-03-25

## 2024-04-18 RX ORDER — NYSTATIN 100000 U/G
CREAM TOPICAL EVERY 12 HOURS
COMMUNITY

## 2024-04-18 RX ORDER — LOSARTAN POTASSIUM 100 MG/1
100 TABLET ORAL
COMMUNITY
Start: 2024-04-06

## 2024-04-18 RX ORDER — POTASSIUM CHLORIDE 750 MG/1
10 TABLET, EXTENDED RELEASE ORAL DAILY
COMMUNITY

## 2024-04-18 RX ORDER — FUROSEMIDE 20 MG/1
20 TABLET ORAL DAILY
COMMUNITY

## 2024-04-18 RX ORDER — CYCLOBENZAPRINE HCL 5 MG
5 TABLET ORAL NIGHTLY PRN
COMMUNITY
Start: 2024-04-04

## 2024-04-18 RX ORDER — METOPROLOL SUCCINATE 100 MG/1
100 TABLET, EXTENDED RELEASE ORAL DAILY
COMMUNITY
Start: 2024-04-07

## 2024-04-18 NOTE — PATIENT INSTRUCTIONS
Continue UTI prevention w Dmanose and Estrogen vaginal cream  Continue bladder retraining, if needs to resume beta 3 agonist myrbetriq will consider clinical   Pharmacy assistance program if needed;     Follow up 1 year UTI, OAB  Sooner if any concerns  Nurse line [phone]

## 2024-04-18 NOTE — PROGRESS NOTES
"04/18/24   [id number]    Chief Complaint   Patient presents with    UTI    OAB      Subjective      HPI Amanda De Jesus is a 69 y.o. female who presents for follow up recurrent UTIs. Last seen 10/17/23 at that time was using Dmanose, Estrogen vaginal cream, one more UTI since last visit; not related to intimacy;     didn't tolerate nightly prophylactic abx caused nausea (macrodantin and trimethoprim);     OAB managed w Myrbetriq 50 mg in late afternoon and Oxybutynin ER 5 mg added for bedtime and was 75-80% last visit but didn't refill Myrbetriq with new year as $$ w deductible; urgency in morning, only accident at  night is r/t back issues if she takes flexoril at dinner; no longer on oxybutynin 5 mg either; would like to see how long without bladder meds she can go, but if needs meds will consider clinical pharmacy program;      UA neg today, PVR 0 cc  2/27/24 Creatinine 1.19, similar range to what it has been past couple yrs  1/5/24 klebsiella+ urine culture  9/18/23 Ecoli + urine culture  4/25/23 Enterobacter +urine culture     Cysto by Dr. Noble 9/22/21 normal  8/15/21 CT abd/pel w IV: atrophic left kidney, right kidney unremarkable;   8/20/2022 GUSTAVO no hydro, similar finding to CT w left atrophic kidney;     PMH: HTN, DMT2, CAD, Solitary kidney, dyslipidemia; sciatica;  PSH: cholecystectomy, knee and hip replacements  FH: mother breast cancer  SH: has smoked 40+ yrs 1/2-1 ppd     exam on 9/14/21 stage I cystocele, stage II rectocele; no uterine descent noted, moderate vaginal atrophy     Objective     BP (!) 159/98   Pulse 89   Ht 1.626 m (5' 4\")   Wt 103 kg (227 lb)   BMI 38.96 kg/m²    Physical Exam  General: Appears comfortable and in no apparent distress, well nourished  Head: Normocephalic, atraumatic  Neck: trachea midline  Respiratory: respirations unlabored, no wheezes, and no use of accessory muscles  Cardiovascular: at rest no dyspnea, well perfused  Skin: no visible rashes or lesions  Neurologic: " grossly intact, oriented to person, place, and time  Psychiatric: mood and affect appropriate  Musculoskeletal: in chair for appt. no difficulty w upper body movement    Assessment/Plan   Problem List Items Addressed This Visit    None  Visit Diagnoses       Recurrent UTI    -  Primary    Relevant Orders    POCT UA Automated manually resulted (Completed)    Post-Void Residual (Completed)    Frequency of urination        Relevant Orders    Post-Void Residual (Completed)    Urge incontinence        Nocturia              Orders Placed This Encounter   Procedures    Post-Void Residual    POCT UA Automated manually resulted     Order Specific Question:   Release result to [facility]     Answer:   Immediate [1]      Continue UTI prevention w Dmanose and Estrogen vaginal cream  Continue bladder retraining, if needs to resume beta 3 agonist myrbetriq will consider clinical   Pharmacy assistance program if needed;     Follow up 1 year UTI, OAB  Sooner if any concerns  Nurse line [phone]       GEORGE Lomeli-CNP  Lab Results   Component Value Date    GLUCOSE 106 (H) 02/27/2024    CALCIUM 9.7 02/27/2024     02/27/2024    K 4.7 02/27/2024    CO2 28 02/27/2024     02/27/2024    BUN 23 02/27/2024    CREATININE 1.19 (H) 02/27/2024

## 2024-04-22 DIAGNOSIS — I25.10 CORONARY ARTERY DISEASE INVOLVING NATIVE CORONARY ARTERY, UNSPECIFIED WHETHER ANGINA PRESENT, UNSPECIFIED WHETHER NATIVE OR TRANSPLANTED HEART: Primary | ICD-10-CM

## 2024-04-23 ENCOUNTER — LAB (OUTPATIENT)
Dept: LAB | Facility: LAB | Age: 69
End: 2024-04-23
Payer: MEDICARE

## 2024-04-23 ENCOUNTER — OFFICE VISIT (OUTPATIENT)
Dept: PAIN MEDICINE | Facility: CLINIC | Age: 69
End: 2024-04-23
Payer: MEDICARE

## 2024-04-23 DIAGNOSIS — M51.27 OTHER INTERVERTEBRAL DISC DISPLACEMENT, LUMBOSACRAL REGION: ICD-10-CM

## 2024-04-23 DIAGNOSIS — M54.16 CHRONIC RADICULAR LUMBAR PAIN: ICD-10-CM

## 2024-04-23 DIAGNOSIS — M51.36 DEGENERATIVE DISC DISEASE, LUMBAR: Primary | ICD-10-CM

## 2024-04-23 DIAGNOSIS — Z79.891 LONG TERM (CURRENT) USE OF OPIATE ANALGESIC: ICD-10-CM

## 2024-04-23 DIAGNOSIS — M54.16 RIGHT LUMBAR RADICULOPATHY: ICD-10-CM

## 2024-04-23 DIAGNOSIS — G89.29 CHRONIC RADICULAR LUMBAR PAIN: ICD-10-CM

## 2024-04-23 DIAGNOSIS — M51.26 HERNIATED NUCLEUS PULPOSUS, L4-5: ICD-10-CM

## 2024-04-23 LAB
AMPHETAMINES UR QL SCN: NORMAL
BARBITURATES UR QL SCN: NORMAL
BZE UR QL SCN: NORMAL
CANNABINOIDS UR QL SCN: NORMAL
CREAT UR-MCNC: 29 MG/DL (ref 20–320)
PCP UR QL SCN: NORMAL

## 2024-04-23 PROCEDURE — 80307 DRUG TEST PRSMV CHEM ANLYZR: CPT

## 2024-04-23 PROCEDURE — 80368 SEDATIVE HYPNOTICS: CPT

## 2024-04-23 PROCEDURE — 80354 DRUG SCREENING FENTANYL: CPT

## 2024-04-23 PROCEDURE — 3048F LDL-C <100 MG/DL: CPT | Performed by: PHYSICAL MEDICINE & REHABILITATION

## 2024-04-23 PROCEDURE — 80365 DRUG SCREENING OXYCODONE: CPT

## 2024-04-23 PROCEDURE — 1159F MED LIST DOCD IN RCRD: CPT | Performed by: PHYSICAL MEDICINE & REHABILITATION

## 2024-04-23 PROCEDURE — 4010F ACE/ARB THERAPY RXD/TAKEN: CPT | Performed by: PHYSICAL MEDICINE & REHABILITATION

## 2024-04-23 PROCEDURE — 80361 OPIATES 1 OR MORE: CPT

## 2024-04-23 PROCEDURE — 3044F HG A1C LEVEL LT 7.0%: CPT | Performed by: PHYSICAL MEDICINE & REHABILITATION

## 2024-04-23 PROCEDURE — 82570 ASSAY OF URINE CREATININE: CPT

## 2024-04-23 PROCEDURE — 80373 DRUG SCREENING TRAMADOL: CPT

## 2024-04-23 PROCEDURE — 80346 BENZODIAZEPINES1-12: CPT

## 2024-04-23 PROCEDURE — 99214 OFFICE O/P EST MOD 30 MIN: CPT | Performed by: PHYSICAL MEDICINE & REHABILITATION

## 2024-04-23 PROCEDURE — 80358 DRUG SCREENING METHADONE: CPT

## 2024-04-23 RX ORDER — HYDROCODONE BITARTRATE AND ACETAMINOPHEN 5; 325 MG/1; MG/1
1 TABLET ORAL EVERY 12 HOURS PRN
Qty: 56 TABLET | Refills: 0 | Status: SHIPPED | OUTPATIENT
Start: 2024-06-01 | End: 2024-06-29

## 2024-04-23 RX ORDER — HYDROCODONE BITARTRATE AND ACETAMINOPHEN 5; 325 MG/1; MG/1
1 TABLET ORAL EVERY 12 HOURS PRN
Qty: 56 TABLET | Refills: 0 | Status: SHIPPED | OUTPATIENT
Start: 2024-05-04 | End: 2024-06-01

## 2024-04-23 NOTE — PROGRESS NOTES
Chief complaint  Back and leg pain     History  Amanda De Jesus is back for pain management office visit  Constant back pain   Intermittent legs pain sciatica bilaterally  Visit focus I discussed about  about considering increasing the dose of the long acting and cut back the number of doses of the short acting medications. That will decrease the number of doses being dispensed daily. Long discussion about putting effort in cutting back on pain medications. Discussed about pain level changing and we do not know if the medications at this amount are still needed until we try and cut back slowly on pain medications. If the cut is tolerated then we continue. If cut not tolerated then, will go back on the pain medications level.  The goal from this is to keep the pain medications at the lowest effective dose.     Following with CT for AAA      Pain level without medication is 7/10 , with the medication pain level 3 to 4 /10.     The pain meds are helping control the pain and improving Activities of Daily living and quality of life and quality of sleep.    opioids treatment agreement Jan 2024     Oarrs pulled and scanned in the chart  no concerns  last urine toxicology testing earlier this year and it was compliant we will repeat  Xray updated spine   ORT Score is  0  Pain pathology and pain generators spine   Modalities tried injection, surgery, physical therapy, TENS unit, nonsteroidal anti-inflammatory medication       Denied any fever or chills. No weight loss and no night sweats. No cough or sputum production. No diarrhea   The constipation has been responding to fibers and over the counter medications.     No bladder and bowel incontinence and no other changes in bladder and bowel. No skin changes.  Reports tiredness and fatigability only if the pain is not controlled.   Denied opioids diversion and abuse and denies alcoholism. Denies overuse of the pain medications.    The control of the pain with the pain  medications is helping the control of the symptoms and allowing the function and activities of daily living, enjoyment of life, improving the quality of life and sleep with less interruption by the pain. The goal is symptomatic control of the nonmalignant chronic pain and not to repair the permanent damage in the tissues inducing the chronic pain conditions. We are aiming to shift the focus from the nonmalignant chronic pain to other aspects of life by symptomatically treating this chronic pain. If this pain is not treated it will lead to major morbidity and it is also associated with increased risks of mortality. The patient understands those very clearly and also understand high risks of morbidity and mortality if not strictly adherent to the treatment recommendations and reporting any associated side effects. Also patient understand the full responsibility associated with these medications to avoid abuse or overuse or any use of these medications for anything besides treating the patient's own chronic pain and nothing else under any circumstances.        Physical examination  Awake, alert and oriented for time place and persons   declined Chaperone for the visit and was adequately  draped for the exam.    Examination of the lumbar spine showed mild reversal of the lumbar lordosis .    Tight muscle bands over the   lower lumbar paraspinals area.  Justice test on the   lower lumbar area increases the pain with stabilization of the lower lumbar facets bilaterally at  L45 and L5S1,  combined with extension and rotation of the lumbar spine to the eith side reproduced and worsened the back pain on that side.  The pain improved with leaning forward.  Straight leg raising was negative.  No sensorimotor deficits in the lower limbs.  Mann testing were negative for axial loading and log rotation.  No aberrant pain behavior.      Diagnosis  Problem List Items Addressed This Visit       Degenerative disc disease, lumbar -  Primary    Relevant Medications    HYDROcodone-acetaminophen (Norco) 5-325 mg tablet (Start on 5/4/2024)    HYDROcodone-acetaminophen (Norco) 5-325 mg tablet (Start on 6/1/2024)    Herniated nucleus pulposus, L4-5    Relevant Medications    HYDROcodone-acetaminophen (Norco) 5-325 mg tablet (Start on 5/4/2024)    HYDROcodone-acetaminophen (Norco) 5-325 mg tablet (Start on 6/1/2024)    Other intervertebral disc displacement, lumbosacral region    Relevant Medications    HYDROcodone-acetaminophen (Norco) 5-325 mg tablet (Start on 5/4/2024)    HYDROcodone-acetaminophen (Norco) 5-325 mg tablet (Start on 6/1/2024)    Right lumbar radiculopathy    Relevant Medications    HYDROcodone-acetaminophen (Norco) 5-325 mg tablet (Start on 5/4/2024)    HYDROcodone-acetaminophen (Norco) 5-325 mg tablet (Start on 6/1/2024)    Chronic radicular lumbar pain    Relevant Medications    HYDROcodone-acetaminophen (Norco) 5-325 mg tablet (Start on 5/4/2024)    HYDROcodone-acetaminophen (Norco) 5-325 mg tablet (Start on 6/1/2024)    Long term (current) use of opiate analgesic    Relevant Medications    HYDROcodone-acetaminophen (Norco) 5-325 mg tablet (Start on 5/4/2024)    HYDROcodone-acetaminophen (Norco) 5-325 mg tablet (Start on 6/1/2024)        Plan  Reviewed the pain generators.  Went over the types of pain with neuropathic and nociceptive and different pathologies and therapeutic modalities. Discussed the mechanism of action of interventions from acupuncture, physical therapy , regular exercises, injections, botox, spinal cord stimulation, and role of surgery     Went over pathology of the intervertebral disc displacement and the anatomical relation to the Nerve roots and relation to the radicular symptoms. Went over treatment modalities with conservative treatment including acupuncture   and epidural steroid injection with fluoroscopy guidance and last resort of surgery    Based on the above findings and the clinical response to the  opioids medications and improvement of the activities of daily living, sleep, and work performance. We made this complex decision to continue the opioids therapy in light of the evidence of the patient's responsibility in using the pain medications as prescribed for the nonmalignant chronic pain condition. We discussed about the use of the pain medications to treat the symptoms of chronic nonmalignant pain and we are not trying the repair the permanent damage in the tissues, rather we are trying to control the symptoms induced by the permanent damage to the tissues inducing the chronic pain condition and resulting disability. I explained the difference and discussed it with the patient and stressed the importance of knowing the difference especially because of the potential side effects and the potential addicting effect and habit forming nature of the dangerous drugs we are using to treat the symptoms of the chronic pain.      We discussed that we are prescribing the medications on good mansa and legitimate medical reason.     We reviewed the side effects and precautions of opioids prescriptions as discussed in the opioids treatment agreement.    realizes the interaction between the therapeutic classes including the respiratory depression and potential death     Random drug testing twice in 6 months we will submit     Hydrocodone 5 bid   has a narcan at home know how and when to use it if needed.     Discussed about NSAIDS and I explained about the opioids sparing effect to allow keeping the opioids dose at minimal effective dose.   I went over the potential side effects of the NSAIDS on the gastrointestinal, renal and cardiovascular systems.      I detailed the side effects from the acetaminophen in the medication and made aware of those. I also explained about the cumulative effects on the organs and mainly the liver.     Given the opioids therapy , we discussed about the risk for accidental over dose on the pain  medications, either for patient or other household. I went over the mechanism of action and mode of use of the Naloxone according to the  recommendations. I will provide a prescription for a kit.     Follow-up 8 weeks or earlier if needed     The level of clinical decision making in this office visit,  is high, given the high risks of complications with the morbidity and mortality due to the fact that acute and chronic pain may pose a threat to life and bodily function, if under treated, poorly treated, or with failure to maintain adequate treatment and timely medical follow up. Additionally over treatment has its own set of complications including overdosing on the pain medications and also the habit forming potentials with the use of the medications used to treat chronic painful conditions including therapeutic classes classified as dangerous medications. Given the serious and fluctuating nature of pain level and instensity with extensive consideration for whenever pain changes, there is always the risk of prolonged functional impairment requiring close patient monitoring with regular assessments and reassessments and high level medical decision making at every office visit. The amount and complexity of data reviewed is high given the patient clinical presentation, labs,  data, radiology reports, and other tests as discussed during office visits. Pertinent data whether positive or negative were taken in consideration in the process of making this high level medical decision.

## 2024-04-24 ENCOUNTER — OFFICE VISIT (OUTPATIENT)
Dept: CARDIOLOGY | Facility: HOSPITAL | Age: 69
End: 2024-04-24
Payer: MEDICARE

## 2024-04-24 VITALS
DIASTOLIC BLOOD PRESSURE: 80 MMHG | WEIGHT: 239.2 LBS | SYSTOLIC BLOOD PRESSURE: 125 MMHG | BODY MASS INDEX: 40.84 KG/M2 | HEART RATE: 88 BPM | HEIGHT: 64 IN

## 2024-04-24 DIAGNOSIS — I25.10 CORONARY ARTERY DISEASE INVOLVING NATIVE CORONARY ARTERY, UNSPECIFIED WHETHER ANGINA PRESENT, UNSPECIFIED WHETHER NATIVE OR TRANSPLANTED HEART: Primary | ICD-10-CM

## 2024-04-24 DIAGNOSIS — R06.02 SHORTNESS OF BREATH: ICD-10-CM

## 2024-04-24 LAB
ATRIAL RATE: 88 BPM
P AXIS: 2 DEGREES
P OFFSET: 193 MS
P ONSET: 137 MS
PR INTERVAL: 180 MS
Q ONSET: 227 MS
QRS COUNT: 14 BEATS
QRS DURATION: 74 MS
QT INTERVAL: 354 MS
QTC CALCULATION(BAZETT): 428 MS
QTC FREDERICIA: 402 MS
R AXIS: 5 DEGREES
T AXIS: 33 DEGREES
T OFFSET: 404 MS
VENTRICULAR RATE: 88 BPM

## 2024-04-24 PROCEDURE — 3074F SYST BP LT 130 MM HG: CPT | Performed by: INTERNAL MEDICINE

## 2024-04-24 PROCEDURE — 99214 OFFICE O/P EST MOD 30 MIN: CPT | Performed by: INTERNAL MEDICINE

## 2024-04-24 PROCEDURE — 93005 ELECTROCARDIOGRAM TRACING: CPT | Performed by: INTERNAL MEDICINE

## 2024-04-24 PROCEDURE — 3048F LDL-C <100 MG/DL: CPT | Performed by: INTERNAL MEDICINE

## 2024-04-24 PROCEDURE — 3061F NEG MICROALBUMINURIA REV: CPT | Performed by: INTERNAL MEDICINE

## 2024-04-24 PROCEDURE — 3044F HG A1C LEVEL LT 7.0%: CPT | Performed by: INTERNAL MEDICINE

## 2024-04-24 PROCEDURE — 4010F ACE/ARB THERAPY RXD/TAKEN: CPT | Performed by: INTERNAL MEDICINE

## 2024-04-24 PROCEDURE — 3079F DIAST BP 80-89 MM HG: CPT | Performed by: INTERNAL MEDICINE

## 2024-04-24 PROCEDURE — 1159F MED LIST DOCD IN RCRD: CPT | Performed by: INTERNAL MEDICINE

## 2024-04-24 PROCEDURE — 93010 ELECTROCARDIOGRAM REPORT: CPT | Performed by: INTERNAL MEDICINE

## 2024-04-24 PROCEDURE — 1160F RVW MEDS BY RX/DR IN RCRD: CPT | Performed by: INTERNAL MEDICINE

## 2024-04-24 NOTE — PROGRESS NOTES
Subjective:  Patient returns for routine 6-month follow-up.  She is a 69-year-old female with known coronary disease status post remote RCA stenting.  She had been doing reasonably well when we saw her 6 months ago.    She unfortunately has been struggling with some increased dyspnea as well as some peripheral edema.  She denies any symptoms to suggest a respiratory tract infection.  She denies any palpitations or angina.  She did have her amlodipine decreased with some slight improvement in her peripheral edema.  She also was started on some furosemide.  She does not appear to carry diagnosis of sleep apnea and does not appear to snore.  Of note, she has not had an echocardiogram checked for quite some time.  She did have her metoprolol increased recently as well to help with her heart rate and blood pressure control.    Objective:  General: Alert, usual self.  HEENT: Unchanged.  Lungs: Diminished air exchange at both bases without crackles or wheezing.  Cardiac: Normal S1 and S2 with soft systolic murmur.  Abdomen: Nontender.  Extremities: 1+ edema bilaterally.  Skin: No rash.  Neuro: Grossly unchanged.    EKG: Normal sinus rhythm.  Slow R wave progression.  No acute changes.    Lipid panel: Cholesterol-158, HDL-53, LDL-62, TG-214.    Impression/plan:  Amanda unfortunately is struggling a bit at this time.  Her heart rate and blood pressure remain under good control.  I do get the sense that we are probably dealing with some degree of fluid overload.  I will stop her hydrochlorothiazide now that she is on the furosemide.  I will double her furosemide for the next 3 days and then return to her usual dose daily.  She will call me in 5 days to update me on her symptomatology and daily weights.    I was pleased to see that she remains on appropriate antiplatelet therapy and that her lipid panel looks at goal given her documented coronary disease and prior RCA stent.    I was also pleased to see that her abdominal aortic  aneurysm appears to remain reasonably stable in size.    I will plan on obtaining an echocardiogram given her concerning dyspnea and peripheral edema.  I will see her back in 3 weeks and make further recommendations pending her response to medication adjustments and her echo results.      Patient instructions:    Stop your hydrochlorothiazide.    Double your furosemide for the next 3 days and call with an update in 5 days.    Report for your echocardiogram when scheduled.    Continue other medications unchanged.    Return to clinic in 3 weeks.

## 2024-04-25 ENCOUNTER — TELEPHONE (OUTPATIENT)
Dept: CARDIOLOGY | Facility: HOSPITAL | Age: 69
End: 2024-04-25
Payer: MEDICARE

## 2024-04-26 RX ORDER — EZETIMIBE 10 MG/1
10 TABLET ORAL DAILY
Qty: 90 TABLET | Refills: 3 | Status: SHIPPED | OUTPATIENT
Start: 2024-04-26 | End: 2025-04-26

## 2024-04-29 ENCOUNTER — TELEPHONE (OUTPATIENT)
Dept: CARDIOLOGY | Facility: CLINIC | Age: 69
End: 2024-04-29
Payer: MEDICARE

## 2024-05-01 LAB
1OH-MIDAZOLAM UR CFM-MCNC: <25 NG/ML
6MAM UR CFM-MCNC: <25 NG/ML
7AMINOCLONAZEPAM UR CFM-MCNC: <25 NG/ML
A-OH ALPRAZ UR CFM-MCNC: <25 NG/ML
ALPRAZ UR CFM-MCNC: <25 NG/ML
CHLORDIAZEP UR CFM-MCNC: <25 NG/ML
CLONAZEPAM UR CFM-MCNC: <25 NG/ML
CODEINE UR CFM-MCNC: <50 NG/ML
DIAZEPAM UR CFM-MCNC: <25 NG/ML
EDDP UR CFM-MCNC: <25 NG/ML
FENTANYL UR CFM-MCNC: <2.5 NG/ML
HYDROCODONE CTO UR CFM-MCNC: 318 NG/ML
HYDROMORPHONE UR CFM-MCNC: 28 NG/ML
LORAZEPAM UR CFM-MCNC: <25 NG/ML
METHADONE UR CFM-MCNC: <25 NG/ML
MIDAZOLAM UR CFM-MCNC: <25 NG/ML
MORPHINE UR CFM-MCNC: <50 NG/ML
NORDIAZEPAM UR CFM-MCNC: <25 NG/ML
NORFENTANYL UR CFM-MCNC: <2.5 NG/ML
NORHYDROCODONE UR CFM-MCNC: 106 NG/ML
NOROXYCODONE UR CFM-MCNC: <25 NG/ML
NORTRAMADOL UR-MCNC: <50 NG/ML
OXAZEPAM UR CFM-MCNC: <25 NG/ML
OXYCODONE UR CFM-MCNC: <25 NG/ML
OXYMORPHONE UR CFM-MCNC: <25 NG/ML
TEMAZEPAM UR CFM-MCNC: <25 NG/ML
TRAMADOL UR CFM-MCNC: <50 NG/ML
ZOLPIDEM UR CFM-MCNC: <25 NG/ML
ZOLPIDEM UR-MCNC: <25 NG/ML

## 2024-05-13 ENCOUNTER — OFFICE VISIT (OUTPATIENT)
Dept: VASCULAR SURGERY | Facility: HOSPITAL | Age: 69
End: 2024-05-13
Payer: MEDICARE

## 2024-05-13 VITALS
SYSTOLIC BLOOD PRESSURE: 162 MMHG | DIASTOLIC BLOOD PRESSURE: 96 MMHG | WEIGHT: 238 LBS | BODY MASS INDEX: 40.63 KG/M2 | HEART RATE: 88 BPM | HEIGHT: 64 IN | OXYGEN SATURATION: 94 %

## 2024-05-13 DIAGNOSIS — I71.42 JUXTARENAL ABDOMINAL AORTIC ANEURYSM (AAA) WITHOUT RUPTURE (CMS-HCC): Primary | ICD-10-CM

## 2024-05-13 PROCEDURE — 3044F HG A1C LEVEL LT 7.0%: CPT | Performed by: SURGERY

## 2024-05-13 PROCEDURE — 3080F DIAST BP >= 90 MM HG: CPT | Performed by: SURGERY

## 2024-05-13 PROCEDURE — 99214 OFFICE O/P EST MOD 30 MIN: CPT | Performed by: SURGERY

## 2024-05-13 PROCEDURE — 4010F ACE/ARB THERAPY RXD/TAKEN: CPT | Performed by: SURGERY

## 2024-05-13 PROCEDURE — 1160F RVW MEDS BY RX/DR IN RCRD: CPT | Performed by: SURGERY

## 2024-05-13 PROCEDURE — 3061F NEG MICROALBUMINURIA REV: CPT | Performed by: SURGERY

## 2024-05-13 PROCEDURE — 1159F MED LIST DOCD IN RCRD: CPT | Performed by: SURGERY

## 2024-05-13 PROCEDURE — 4004F PT TOBACCO SCREEN RCVD TLK: CPT | Performed by: SURGERY

## 2024-05-13 PROCEDURE — 3077F SYST BP >= 140 MM HG: CPT | Performed by: SURGERY

## 2024-05-13 PROCEDURE — 3048F LDL-C <100 MG/DL: CPT | Performed by: SURGERY

## 2024-05-13 ASSESSMENT — ENCOUNTER SYMPTOMS
DEPRESSION: 0
LOSS OF SENSATION IN FEET: 0
OCCASIONAL FEELINGS OF UNSTEADINESS: 0

## 2024-05-13 NOTE — PROGRESS NOTES
Vascular Surgery Consult/Clinic Note    CC: Follow up    HPI:  Amanda De Jesus is 68 y.o. female with history of CAD (hx of PCI for MI), HTN, HLD and single right kidney who referred for AAA. Patient reports she is trying to quit tobacco use. 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 She denies any abdominal pain. She has chronic back pain but denies any changes in severity or quality.     Meds:   Current Outpatient Medications on File Prior to Visit   Medication Sig Dispense Refill    acetaminophen (Tylenol) 325 mg tablet Take 1 tablet (325 mg) by mouth every 6 hours if needed.      amLODIPine (Norvasc) 5 mg tablet Take 1 tablet (5 mg) by mouth once daily in the morning. Take before meals.      ascorbic acid, vitamin C, 500 mg capsule Take by mouth. Take as directed      aspirin 81 mg EC tablet Take 1 tablet (81 mg) by mouth once daily.      atorvastatin (Lipitor) 80 mg tablet Take 1 tablet (80 mg) by mouth once daily.      cholecalciferol (Vitamin D-3) 50 mcg (2,000 unit) capsule Take 4,125 capsules (206,250 mcg) by mouth early in the morning..      CRANBERRY ORAL Take 2 capsules by mouth once daily.      cyanocobalamin (Vitamin B-12) 1,000 mcg tablet Take by mouth every other day.      cyclobenzaprine (Flexeril) 5 mg tablet Take 1 tablet (5 mg) by mouth as needed at bedtime.      ezetimibe (Zetia) 10 mg tablet Take 1 tablet (10 mg) by mouth once daily. 90 tablet 3    famotidine-Ca carb-mag hydrox (Pepcid Complete) -165 mg chewable tablet Chew 1 tablet every 12 hours if needed.      furosemide (Lasix) 20 mg tablet Take 1 tablet (20 mg) by mouth once daily.      gabapentin (Neurontin) 300 mg capsule Take 1 capsule (300 mg) by mouth 2 times a day. 60 capsule 1    HYDROcodone-acetaminophen (Norco) 5-325 mg tablet Take 1 tablet by mouth every 12 hours if needed for severe pain (7 - 10) for up to 28 days. Do not start before May 4, 2024. 56 tablet 0    [START ON 6/1/2024] HYDROcodone-acetaminophen (Norco) 5-325 mg  tablet Take 1 tablet by mouth every 12 hours if needed for severe pain (7 - 10) for up to 28 days. Do not start before June 1, 2024. 56 tablet 0    losartan (Cozaar) 100 mg tablet Take 1 tablet (100 mg) by mouth once daily in the morning. Take before meals.      metFORMIN  mg 24 hr tablet Take 1 tablet (500 mg) by mouth once daily in the evening.      metoprolol succinate XL (Toprol-XL) 100 mg 24 hr tablet Take 1 tablet (100 mg) by mouth once daily.      multivitamin tablet Take 1 tablet by mouth once daily.      naloxone (Narcan) 4 mg/0.1 mL nasal spray Administer 1 spray (4 mg) into affected nostril(s) if needed for opioid reversal for up to 2 doses. May repeat every 2-3 minutes if needed, alternating nostrils, until medical assistance becomes available. 2 each 0    nystatin (Mycostatin) cream every 12 hours.      potassium chloride CR 10 mEq ER tablet Take 1 tablet (10 mEq) by mouth once daily.      varenicline (Chantix) 0.5 mg tablet Take 1 tablet (0.5 mg) by mouth 2 times a day. Take with full glass of water.      zinc gluconate 100 mg tablet Take by mouth. Zinc 100 MG Oral Tablet; TAKE AS DIRECTED.       No current facility-administered medications on file prior to visit.        Allergies:   Allergies   Allergen Reactions    Lansoprazole Anaphylaxis and Swelling    Semaglutide Nausea Only    Cefprozil Rash and Swelling       SH:    Social Determinants of Health     Tobacco Use: High Risk (5/13/2024)    Patient History     Smoking Tobacco Use: Every Day     Smokeless Tobacco Use: Never     Passive Exposure: Not on file   Alcohol Use: Not At Risk (10/25/2023)    Received from [facility]    AUDIT-C     Frequency of Alcohol Consumption: Never     Average Number of Drinks: Patient does not drink     Frequency of Binge Drinking: Never   Financial Resource Strain: Medium Risk (10/25/2023)    Received from [facility]    Overall Financial Resource Strain (CARDIA)     Difficulty of Paying Living  Expenses: Somewhat hard   Food Insecurity: No Food Insecurity (10/25/2023)    Received from [facility]    Hunger Vital Sign     Worried About Running Out of Food in the Last Year: Never true     Ran Out of Food in the Last Year: Never true   Transportation Needs: No Transportation Needs (10/25/2023)    Received from [facility]    PRAPARE - Transportation     Lack of Transportation (Medical): No     Lack of Transportation (Non-Medical): No   Physical Activity: Unknown (10/25/2023)    Received from [facility]    Exercise Vital Sign     Days of Exercise per Week: 1 day     Minutes of Exercise per Session: Patient declined   Stress: No Stress Concern Present (10/25/2023)    Received from [facility]    Citizen of [place] [employer] - Occupational Stress Questionnaire     Feeling of Stress : Not at all   Social Connections: Socially Integrated (10/25/2023)    Received from [facility]    Social Connection and Isolation Panel [NHANES]     Frequency of Communication with Friends and Family: More than three times a week     Frequency of Social Gatherings with Friends and Family: Once a week     Attends Restoration Services: More than 4 times per year     Active Member of Clubs or Organizations: Yes     Attends Club or Organization Meetings: More than 4 times per year     Marital Status:    Intimate Partner Violence: Not At Risk (10/25/2023)    Received from [facility]    Humiliation, Afraid, Rape, and Kick questionnaire     Fear of Current or Ex-Partner: No     Emotionally Abused: No     Physically Abused: No     Sexually Abused: No   Depression: Not on file   Housing Stability: Unknown (10/25/2023)    Received from [facility]    Housing Stability Vital Sign     Unable to Pay for Housing in the Last Year: No     Number of Places Lived in the Last Year: Not on file     Unstable Housing in the Last Year: No   Utilities: Not on file   Digital Equity: Not on file   Health  Literacy: Not on file        FH:  Family History   Problem Relation Name Age of Onset    Breast cancer Mother      Other (Varicose veins) Mother      Heart disease Father      Kidney failure Father      Diabetes type II Father      Pancreatic cancer Other sibling           Objective:  Vitals:  Vitals:    05/13/24 1025   BP: (!) 162/96   Pulse: 88   SpO2: 94%        Exam:  Gen: in NAD  GI: Soft, ND/NT  Ext:  BLE with 5/5 motor str.     Imaging:  CTA abd/pelv (4/16/2024) independently reviewed.   Juxta-renal AAA approx. 47 mm x 46 mm when measured using SVS measuring guideline.   Chronic LRA occlusion    CTA abd/pelv (11/2/2023) independently reviewed.   Juxta-renal AAA approx. 47 mm x 46 mm when measured using SVS measuring guideline.     Assessment & Plan:  Amanda De Jesus is 68 y.o. female with Asx. 47 x 46 mm Juxta-renal AA.    - Cont. ASA and statin therapy with BP control.   - Tobacco cessation.    - Discussed signs and sx. Of rupture. In such case, patient was instructed to present to the nearest ER. Patient verbalized her understanding.    - Follow up in 6 months with non-con CT abd/pelv.       Raciel Hernandez MD, PhD  Clinical   [facility] [employer]  Co-Director, Aortic Center  [facility] [place]

## 2024-05-22 ENCOUNTER — OFFICE VISIT (OUTPATIENT)
Dept: CARDIOLOGY | Facility: HOSPITAL | Age: 69
End: 2024-05-22
Payer: MEDICARE

## 2024-05-22 ENCOUNTER — HOSPITAL ENCOUNTER (OUTPATIENT)
Dept: CARDIOLOGY | Facility: HOSPITAL | Age: 69
Discharge: HOME | End: 2024-05-22
Payer: MEDICARE

## 2024-05-22 DIAGNOSIS — R06.00 DYSPNEA, UNSPECIFIED: ICD-10-CM

## 2024-05-22 DIAGNOSIS — R06.02 SHORTNESS OF BREATH: ICD-10-CM

## 2024-05-22 DIAGNOSIS — R06.02 SHORTNESS OF BREATH: Primary | ICD-10-CM

## 2024-05-22 PROCEDURE — 3061F NEG MICROALBUMINURIA REV: CPT | Performed by: INTERNAL MEDICINE

## 2024-05-22 PROCEDURE — 1160F RVW MEDS BY RX/DR IN RCRD: CPT | Performed by: INTERNAL MEDICINE

## 2024-05-22 PROCEDURE — 4004F PT TOBACCO SCREEN RCVD TLK: CPT | Performed by: INTERNAL MEDICINE

## 2024-05-22 PROCEDURE — 3078F DIAST BP <80 MM HG: CPT | Performed by: INTERNAL MEDICINE

## 2024-05-22 PROCEDURE — 3048F LDL-C <100 MG/DL: CPT | Performed by: INTERNAL MEDICINE

## 2024-05-22 PROCEDURE — 4010F ACE/ARB THERAPY RXD/TAKEN: CPT | Performed by: INTERNAL MEDICINE

## 2024-05-22 PROCEDURE — 93306 TTE W/DOPPLER COMPLETE: CPT | Performed by: INTERNAL MEDICINE

## 2024-05-22 PROCEDURE — 99213 OFFICE O/P EST LOW 20 MIN: CPT | Mod: 25 | Performed by: INTERNAL MEDICINE

## 2024-05-22 PROCEDURE — 1159F MED LIST DOCD IN RCRD: CPT | Performed by: INTERNAL MEDICINE

## 2024-05-22 PROCEDURE — 3044F HG A1C LEVEL LT 7.0%: CPT | Performed by: INTERNAL MEDICINE

## 2024-05-22 PROCEDURE — 99213 OFFICE O/P EST LOW 20 MIN: CPT | Performed by: INTERNAL MEDICINE

## 2024-05-22 PROCEDURE — 93306 TTE W/DOPPLER COMPLETE: CPT

## 2024-05-22 PROCEDURE — 3075F SYST BP GE 130 - 139MM HG: CPT | Performed by: INTERNAL MEDICINE

## 2024-05-23 VITALS
DIASTOLIC BLOOD PRESSURE: 75 MMHG | HEART RATE: 68 BPM | SYSTOLIC BLOOD PRESSURE: 135 MMHG | BODY MASS INDEX: 40.34 KG/M2 | WEIGHT: 236.3 LBS | HEIGHT: 64 IN

## 2024-05-23 LAB
AORTIC VALVE MEAN GRADIENT: 6 MMHG
AORTIC VALVE PEAK VELOCITY: 1.49 M/S
AV PEAK GRADIENT: 8.9 MMHG
AVA (PEAK VEL): 2.84 CM2
AVA (VTI): 2.38 CM2
EJECTION FRACTION APICAL 4 CHAMBER: 55.8
LEFT ATRIUM VOLUME AREA LENGTH INDEX BSA: 31.7 ML/M2
LEFT VENTRICLE INTERNAL DIMENSION DIASTOLE: 4.1 CM (ref 3.5–6)
LEFT VENTRICULAR OUTFLOW TRACT DIAMETER: 2.3 CM
LV EJECTION FRACTION BIPLANE: 60 %
MITRAL VALVE E/A RATIO: 0.67
RIGHT VENTRICLE FREE WALL PEAK S': 9.57 CM/S
RIGHT VENTRICLE PEAK SYSTOLIC PRESSURE: 37.6 MMHG
TRICUSPID ANNULAR PLANE SYSTOLIC EXCURSION: 2.1 CM

## 2024-05-23 NOTE — PROGRESS NOTES
Subjective:  Patient returns for a follow-up.  She did start her furosemide with minor improvement in her peripheral edema and dyspnea.  She is still struggling a bit with this at this time.  She did have her echocardiogram performed, and I will review the results with her when they are available.  Unfortunately, she is having some occasional atypical chest and back pain symptomatology.  These unfortunately are somewhat reminiscent of what she had prior to her RCA stent.  She denies any other new health concerns.    Objective:  General: Alert, usual self.  HEENT: Unchanged.  Lungs: Generally clear.  Cardiac: Distant heart tones.  Abdomen: Nontender with normal bowel sounds.  Extremities: Decreased edema.    Impression/plan:  Amanda unfortunately is struggling a bit at this time.  Her blood pressure remains in good range, but she is still having some ongoing dyspnea as well as some atypical chest pain.  I will increase her furosemide to 40 mg on Monday, Wednesday and Friday, and she will call me with an update in about a week.  I will make further adjustments in her medical regimen at that time pending her response to the adjusted diuretic dose.  I will review her echo results with her at that time as well.  She knows to alert me for any worsening chest and back pain symptomatology in which case we may need to consider repeat ischemic workup probably with a repeat coronary arteriogram.    Patient instructions:    Adjust your furosemide dosing as directed.    Call with an update in 1 week.    Return to clinic in 2 months.

## 2024-05-28 DIAGNOSIS — I71.42 JUXTARENAL ABDOMINAL AORTIC ANEURYSM (AAA) WITHOUT RUPTURE (CMS-HCC): Primary | ICD-10-CM

## 2024-05-29 ENCOUNTER — TELEPHONE (OUTPATIENT)
Dept: CARDIOLOGY | Facility: CLINIC | Age: 69
End: 2024-05-29
Payer: MEDICARE

## 2024-05-30 ENCOUNTER — TELEPHONE (OUTPATIENT)
Dept: UROLOGY | Facility: CLINIC | Age: 69
End: 2024-05-30
Payer: MEDICARE

## 2024-05-30 ENCOUNTER — APPOINTMENT (OUTPATIENT)
Dept: CARDIOLOGY | Facility: CLINIC | Age: 69
End: 2024-05-30
Payer: MEDICARE

## 2024-05-30 DIAGNOSIS — N39.41 URGE INCONTINENCE: Primary | ICD-10-CM

## 2024-05-30 RX ORDER — MIRABEGRON 50 MG/1
50 TABLET, EXTENDED RELEASE ORAL DAILY
Qty: 30 TABLET | Refills: 11 | Status: SHIPPED | OUTPATIENT
Start: 2024-05-30 | End: 2025-05-30

## 2024-05-30 NOTE — TELEPHONE ENCOUNTER
Pt left message stating she has been off the Myrbetriq since she last talked to you to see if she really needed it and she states her sx are worse now so she would like to go back on the Myrbetriq. Please send to [facility] pharmacy, thanks.

## 2024-05-31 DIAGNOSIS — I71.42 JUXTARENAL ABDOMINAL AORTIC ANEURYSM (AAA) WITHOUT RUPTURE (CMS-HCC): Primary | ICD-10-CM

## 2024-06-06 NOTE — TELEPHONE ENCOUNTER
PA request for Myrbetriq was denied. Detailed message left on pt VM and sent my chart message asking if she would like to use [id number] in the meantime until she can get that appt with Peyton Leal. I will update once I hear back from her, thanks.

## 2024-06-14 ENCOUNTER — TELEPHONE (OUTPATIENT)
Dept: CARDIOLOGY | Facility: CLINIC | Age: 69
End: 2024-06-14
Payer: MEDICARE

## 2024-06-18 ENCOUNTER — APPOINTMENT (OUTPATIENT)
Dept: PAIN MEDICINE | Facility: CLINIC | Age: 69
End: 2024-06-18
Payer: MEDICARE

## 2024-06-18 DIAGNOSIS — M54.16 RIGHT LUMBAR RADICULOPATHY: ICD-10-CM

## 2024-06-18 DIAGNOSIS — M54.16 CHRONIC RADICULAR LUMBAR PAIN: ICD-10-CM

## 2024-06-18 DIAGNOSIS — M51.36 DEGENERATIVE DISC DISEASE, LUMBAR: ICD-10-CM

## 2024-06-18 DIAGNOSIS — M51.26 HERNIATED NUCLEUS PULPOSUS, L4-5: ICD-10-CM

## 2024-06-18 DIAGNOSIS — Z79.891 LONG TERM (CURRENT) USE OF OPIATE ANALGESIC: ICD-10-CM

## 2024-06-18 DIAGNOSIS — M51.27 OTHER INTERVERTEBRAL DISC DISPLACEMENT, LUMBOSACRAL REGION: ICD-10-CM

## 2024-06-18 DIAGNOSIS — M54.32 SCIATICA OF LEFT SIDE: Primary | ICD-10-CM

## 2024-06-18 DIAGNOSIS — G89.29 CHRONIC RADICULAR LUMBAR PAIN: ICD-10-CM

## 2024-06-18 PROCEDURE — 3061F NEG MICROALBUMINURIA REV: CPT | Performed by: PHYSICAL MEDICINE & REHABILITATION

## 2024-06-18 PROCEDURE — 3044F HG A1C LEVEL LT 7.0%: CPT | Performed by: PHYSICAL MEDICINE & REHABILITATION

## 2024-06-18 PROCEDURE — 99214 OFFICE O/P EST MOD 30 MIN: CPT | Performed by: PHYSICAL MEDICINE & REHABILITATION

## 2024-06-18 PROCEDURE — 3048F LDL-C <100 MG/DL: CPT | Performed by: PHYSICAL MEDICINE & REHABILITATION

## 2024-06-18 PROCEDURE — 4010F ACE/ARB THERAPY RXD/TAKEN: CPT | Performed by: PHYSICAL MEDICINE & REHABILITATION

## 2024-06-18 RX ORDER — HYDROCODONE BITARTRATE AND ACETAMINOPHEN 5; 325 MG/1; MG/1
1 TABLET ORAL EVERY 12 HOURS PRN
Qty: 56 TABLET | Refills: 0 | Status: SHIPPED | OUTPATIENT
Start: 2024-07-03 | End: 2024-07-31

## 2024-06-18 RX ORDER — GABAPENTIN 300 MG/1
300 CAPSULE ORAL 2 TIMES DAILY
Qty: 60 CAPSULE | Refills: 1 | Status: SHIPPED | OUTPATIENT
Start: 2024-06-18 | End: 2024-07-18

## 2024-06-18 RX ORDER — HYDROCODONE BITARTRATE AND ACETAMINOPHEN 5; 325 MG/1; MG/1
1 TABLET ORAL EVERY 12 HOURS PRN
Qty: 56 TABLET | Refills: 0 | Status: SHIPPED | OUTPATIENT
Start: 2024-08-01 | End: 2024-08-29

## 2024-06-18 NOTE — PROGRESS NOTES
Chief complaint  Back and left leg pain     History  Amanda De Jesus is back for pain management office visit  Continue with back pain and raditing to the limb  Hips better after THR  The pain in the back is deep achy worse in the mid back area.  This is associated with tight muscle bands.  This limits the range of motion of the lumbar spine mainly in forward flexion.  The pain in the back is radiating around the side on the lateral aspect of the left thigh going down toward the lateral aspect of the left leg into the lateral malleolus toward the lateral aspect of the foot towards the left big toe.    This pain down to the left lower limb is more of a burning tingling sensation.  The pain in the left lower limb worsens with bending forward or with any lifting, it improves with laying on the side and resting.  This is similar to the associated pain in the middle to lower back.  Denied any bowel or bladder incontinence.  With the worst pain there is tendency to catch the toe especially on carpeted area.  This occurs mostly when tired and toward the end of the day.    The skin is intact with no breakdown.  No vesicles.    Long discussion about putting effort in cutting back on pain medications. Discussed about pain level changing and we do not know if the medications at this amount are still needed until we try and cut back slowly on pain medications. If the cut is tolerated then we continue. If cut not tolerated then, will go back on the pain medications level.  The goal from this is to keep the pain medications at the lowest effective dose.     Pain level without medication is 8/10 , with the medication pain level 3 to 4 /10.     The pain meds are helping control the pain and improving Activities of Daily living and quality of life and quality of sleep.    opioids treatment agreement Jan 2024  Pill count today, forgot pill at home  Oarrs pulled and reviewed, no concerns  last urine toxicology testing earlier this year  and it was compliant we will repeat  Xray updated spine   ORT Score is  0  Pain pathology and pain generators spine   Modalities tried injection, surgery, physical therapy, TENS unit, nonsteroidal anti-inflammatory medication       Review of Systems :  Denied any fever or chills. No weight loss and no night sweats. No cough or sputum production. No diarrhea   The constipation has been responding to fibers and over the counter medications.     No bladder and bowel incontinence and no other changes in bladder and bowel. No skin changes.  Reports tiredness and fatigability only if the pain is not controlled.   Denied opioids diversion and abuse and denies alcoholism. Denies overuse of the pain medications.    The control of the pain with the pain medications is helping the control of the symptoms and allowing the function and activities of daily living, enjoyment of life, improving the quality of life and sleep with less interruption by the pain. The goal is symptomatic control of the nonmalignant chronic pain and not to repair the permanent damage in the tissues inducing the chronic pain conditions. We are aiming to shift the focus from the nonmalignant chronic pain to other aspects of life by symptomatically treating this chronic pain. If this pain is not treated it will lead to major morbidity and it is also associated with increased risks of mortality. The patient understands those very clearly and also understand high risks of morbidity and mortality if not strictly adherent to the treatment recommendations and reporting any associated side effects. Also patient understand the full responsibility associated with these medications to avoid abuse or overuse or any use of these medications for anything besides treating the patient's own chronic pain and nothing else under any circumstances.        Physical examination  Awake, alert and oriented for time place and persons   declined Chaperone for the visit and was  adequately  draped for the exam.    Examination of the lumbar spine showed mild reversal of the lumbar lordosis .    Tight muscle bands over the   lower lumbar paraspinals area.  Justice test on the   lower lumbar area increases the pain with stabilization of the lower lumbar facets bilaterally at  L45 and L5S1,  combined with extension and rotation of the lumbar spine to the eith side reproduced and worsened the back pain on that side.  The pain improved with leaning forward.  Straight leg raising was negative.  No sensorimotor deficits in the lower limbs.  Mann testing were negative for axial loading and log rotation.  No aberrant pain behavior.      Diagnosis  Problem List Items Addressed This Visit       Degenerative disc disease, lumbar    Relevant Medications    gabapentin (Neurontin) 300 mg capsule    HYDROcodone-acetaminophen (Norco) 5-325 mg tablet (Start on 7/3/2024)    HYDROcodone-acetaminophen (Norco) 5-325 mg tablet (Start on 8/1/2024)    Other Relevant Orders    XR lumbar spine 4+ views w flexion extension    Herniated nucleus pulposus, L4-5    Relevant Medications    gabapentin (Neurontin) 300 mg capsule    HYDROcodone-acetaminophen (Norco) 5-325 mg tablet (Start on 7/3/2024)    HYDROcodone-acetaminophen (Norco) 5-325 mg tablet (Start on 8/1/2024)    Other intervertebral disc displacement, lumbosacral region    Relevant Medications    gabapentin (Neurontin) 300 mg capsule    HYDROcodone-acetaminophen (Norco) 5-325 mg tablet (Start on 7/3/2024)    HYDROcodone-acetaminophen (Norco) 5-325 mg tablet (Start on 8/1/2024)    Right lumbar radiculopathy    Relevant Medications    gabapentin (Neurontin) 300 mg capsule    HYDROcodone-acetaminophen (Norco) 5-325 mg tablet (Start on 7/3/2024)    HYDROcodone-acetaminophen (Norco) 5-325 mg tablet (Start on 8/1/2024)    Sciatica - Primary    Chronic radicular lumbar pain    Relevant Medications    gabapentin (Neurontin) 300 mg capsule    HYDROcodone-acetaminophen  (Norco) 5-325 mg tablet (Start on 7/3/2024)    HYDROcodone-acetaminophen (Norco) 5-325 mg tablet (Start on 8/1/2024)    Long term (current) use of opiate analgesic    Relevant Medications    gabapentin (Neurontin) 300 mg capsule    HYDROcodone-acetaminophen (Norco) 5-325 mg tablet (Start on 7/3/2024)    HYDROcodone-acetaminophen (Norco) 5-325 mg tablet (Start on 8/1/2024)        Plan  Reviewed the pain generators.  Went over the types of pain with neuropathic and nociceptive and different pathologies and therapeutic modalities. Discussed the mechanism of action of interventions from acupuncture, physical therapy , regular exercises, injections, botox, spinal cord stimulation, and role of surgery     Went over pathology of the intervertebral disc displacement and the anatomical relation to the Nerve roots and relation to the radicular symptoms. Went over treatment modalities with conservative treatment including acupuncture   and epidural steroid injection with fluoroscopy guidance and last resort of surgery    Based on the above findings and the clinical response to the opioids medications and improvement of the activities of daily living, sleep, and work performance. We made this complex decision to continue the opioids therapy in light of the evidence of the patient's responsibility in using the pain medications as prescribed for the nonmalignant chronic pain condition. We discussed about the use of the pain medications to treat the symptoms of chronic nonmalignant pain and we are not trying the repair the permanent damage in the tissues, rather we are trying to control the symptoms induced by the permanent damage to the tissues inducing the chronic pain condition and resulting disability. I explained the difference and discussed it with the patient and stressed the importance of knowing the difference especially because of the potential side effects and the potential addicting effect and habit forming nature of  the dangerous drugs we are using to treat the symptoms of the chronic pain.      We discussed that we are prescribing the medications on good manas and legitimate medical reason.     We reviewed the side effects and precautions of opioids prescriptions as discussed in the opioids treatment agreement.    realizes the interaction between the therapeutic classes including the respiratory depression and potential death     Random drug testing   we will submit     Repeat xray  Hydrocodone bid  realizes the interaction between the therapeutic classes including the respiratory depression and potential death  has a narcan at home know how and when to use it if needed.     Discussed about NSAIDS and I explained about the opioids sparing effect to allow keeping the opioids dose at minimal effective dose.   I went over the potential side effects of the NSAIDS on the gastrointestinal, renal and cardiovascular systems.      I detailed the side effects from the acetaminophen in the medication and made aware of those. I also explained about the cumulative effects on the organs and mainly the liver.     Given the opioids therapy , we discussed about the risk for accidental over dose on the pain medications, either for patient or other household. I went over the mechanism of action and mode of use of the Naloxone according to the  recommendations. I will provide a prescription for a kit.     Follow-up 8 weeks or earlier if needed     The level of clinical decision making in this office visit,  is high, given the high risks of complications with the morbidity and mortality due to the fact that acute and chronic pain may pose a threat to life and bodily function, if under treated, poorly treated, or with failure to maintain adequate treatment and timely medical follow up. Additionally over treatment has its own set of complications including overdosing on the pain medications and also the habit forming potentials with the  use of the medications used to treat chronic painful conditions including therapeutic classes classified as dangerous medications. Given the serious and fluctuating nature of pain level and instensity with extensive consideration for whenever pain changes, there is always the risk of prolonged functional impairment requiring close patient monitoring with regular assessments and reassessments and high level medical decision making at every office visit. The amount and complexity of data reviewed is high given the patient clinical presentation, labs,  data, radiology reports, and other tests as discussed during office visits. Pertinent data whether positive or negative were taken in consideration in the process of making this high level medical decision.

## 2024-06-20 DIAGNOSIS — N39.41 URGE INCONTINENCE: ICD-10-CM

## 2024-06-20 RX ORDER — MIRABEGRON 50 MG/1
50 TABLET, EXTENDED RELEASE ORAL DAILY
Qty: 90 TABLET | Refills: 3 | Status: SHIPPED | OUTPATIENT
Start: 2024-06-20 | End: 2025-06-20

## 2024-06-20 NOTE — TELEPHONE ENCOUNTER
Pt finally returned my call and states she has an appt with [facility] on 07/02 but she would like to proceed with getting the 90 days of generic Myrbetriq from [facility]. Please print and sign so I can send, thanks.

## 2024-07-02 ENCOUNTER — APPOINTMENT (OUTPATIENT)
Dept: PHARMACY | Facility: HOSPITAL | Age: 69
End: 2024-07-02
Payer: MEDICARE

## 2024-07-05 ENCOUNTER — TELEPHONE (OUTPATIENT)
Dept: CARDIOLOGY | Facility: CLINIC | Age: 69
End: 2024-07-05
Payer: MEDICARE

## 2024-07-08 DIAGNOSIS — R60.9 EDEMA: Primary | ICD-10-CM

## 2024-07-08 RX ORDER — FUROSEMIDE 40 MG/1
40 TABLET ORAL DAILY
Qty: 90 TABLET | Refills: 3 | Status: SHIPPED | OUTPATIENT
Start: 2024-07-08 | End: 2025-07-08

## 2024-07-23 ENCOUNTER — OFFICE VISIT (OUTPATIENT)
Dept: CARDIOLOGY | Facility: HOSPITAL | Age: 69
End: 2024-07-23
Payer: MEDICARE

## 2024-07-23 VITALS
WEIGHT: 238 LBS | SYSTOLIC BLOOD PRESSURE: 146 MMHG | HEIGHT: 64 IN | BODY MASS INDEX: 40.63 KG/M2 | DIASTOLIC BLOOD PRESSURE: 81 MMHG | HEART RATE: 86 BPM

## 2024-07-23 DIAGNOSIS — E11.9 TYPE 2 DIABETES MELLITUS WITHOUT COMPLICATION, WITHOUT LONG-TERM CURRENT USE OF INSULIN (MULTI): ICD-10-CM

## 2024-07-23 DIAGNOSIS — I10 PRIMARY HYPERTENSION: ICD-10-CM

## 2024-07-23 DIAGNOSIS — E78.2 MIXED HYPERLIPIDEMIA: ICD-10-CM

## 2024-07-23 DIAGNOSIS — I25.10 CORONARY ARTERY DISEASE INVOLVING NATIVE CORONARY ARTERY OF NATIVE HEART WITHOUT ANGINA PECTORIS: Primary | ICD-10-CM

## 2024-07-23 PROBLEM — E66.9 OBESITY: Status: ACTIVE | Noted: 2024-07-23

## 2024-07-23 PROCEDURE — 1159F MED LIST DOCD IN RCRD: CPT | Performed by: NURSE PRACTITIONER

## 2024-07-23 PROCEDURE — 3008F BODY MASS INDEX DOCD: CPT | Performed by: NURSE PRACTITIONER

## 2024-07-23 PROCEDURE — 1160F RVW MEDS BY RX/DR IN RCRD: CPT | Performed by: NURSE PRACTITIONER

## 2024-07-23 PROCEDURE — 99214 OFFICE O/P EST MOD 30 MIN: CPT | Performed by: NURSE PRACTITIONER

## 2024-07-23 PROCEDURE — 3048F LDL-C <100 MG/DL: CPT | Performed by: NURSE PRACTITIONER

## 2024-07-23 PROCEDURE — 3061F NEG MICROALBUMINURIA REV: CPT | Performed by: NURSE PRACTITIONER

## 2024-07-23 PROCEDURE — 4004F PT TOBACCO SCREEN RCVD TLK: CPT | Performed by: NURSE PRACTITIONER

## 2024-07-23 PROCEDURE — 3044F HG A1C LEVEL LT 7.0%: CPT | Performed by: NURSE PRACTITIONER

## 2024-07-23 PROCEDURE — 3077F SYST BP >= 140 MM HG: CPT | Performed by: NURSE PRACTITIONER

## 2024-07-23 PROCEDURE — 4010F ACE/ARB THERAPY RXD/TAKEN: CPT | Performed by: NURSE PRACTITIONER

## 2024-07-23 PROCEDURE — 3079F DIAST BP 80-89 MM HG: CPT | Performed by: NURSE PRACTITIONER

## 2024-07-23 ASSESSMENT — ENCOUNTER SYMPTOMS: HYPERTENSION: 1

## 2024-07-23 NOTE — PROGRESS NOTES
Subjective   Amanda De Jesus is a 69 y.o. female.    Chief Complaint:  Coronary Artery Disease, Hyperlipidemia, and Hypertension    Mrs. De Jesus returns for a routine 2 month follow up. Her peripheral edema has improved since starting lasix. She is also no longer struggling with chest pain. She does continue to have back pain, though this appears to be more musculoskeletal in nature. She denies any recent ER visits or hospitalizations. She offers no specific cardiovascular complaints or concerns today. She denies any complaints of chest pain, shortness of breath, lightheadedness, dizziness, palpitations, syncope, orthopnea, paroxysmal nocturnal dyspnea, lower extremity swelling or bleeding concerns.      Coronary Artery Disease  Risk factors include hyperlipidemia.   Hyperlipidemia    Hypertension        Review of Systems   All other systems reviewed and are negative.      Objective   Physical Exam  Constitutional:       Appearance: Healthy appearance. In no distress  Pulmonary:      Effort: Pulmonary effort is normal.      Breath sounds: Normal breath sounds.   Cardiovascular:      Normal rate. Regular rhythm. Normal S1. Normal S2.       Murmurs: There is no murmur.      Carotids: right carotid pulse +2, no bruit heard over the right carotid. left carotid pulse +2, no bruit heard over the left carotid.  Edema:     Peripheral edema absent.   Abdominal:      Palpations: Abdomen is soft.   Musculoskeletal:       Cervical back: Normal range of motion.   Skin:     General: Skin is warm and dry. Normal color and pigmentation   Neurological:      Mental Status: Alert and oriented to person, place and time.   Psychiatric:     Mood and Affect: appropriate mood and appropriate affect.       Lab Review:   Lab Results   Component Value Date     02/27/2024    K 4.7 02/27/2024     02/27/2024    CO2 28 02/27/2024    BUN 23 02/27/2024    CREATININE 1.19 (H) 02/27/2024    GLUCOSE 106 (H) 02/27/2024    CALCIUM 9.7  02/27/2024     Lab Results   Component Value Date    WBC 5.9 02/27/2024    HGB 15.2 02/27/2024    HCT 46.0 02/27/2024    MCV 96 02/27/2024     02/27/2024     Lab Results   Component Value Date    CHOL 158 02/27/2024    TRIG 214 (H) 02/27/2024    HDL 53.3 02/27/2024       Assessment/Plan   Mrs. De Jesus is a pleasant 69 year old  female with a past medical history significant for hypertension, hyperlipidemia, T2DM, AAA followed by vascular, nicotine dependence and CAD s/p remote mid and distal RCA stenting in 2006. She also reportedly only has one kidney. NM stress test 6/2017 showed no evidence of stress induced ischemia or scar. Echocardiogram 5/2024 showed normal LV and RV function with no significant valvular disease. She presents today for routine follow up stable from a cardiac standpoint. Her VS remain stable. She remains on appropriate antiplatelet and lipid lowering therapy with her LDL at goal. I was pleased to hear she is no longer struggling with chest pain and that her swelling has improved. I will have her continue all medications unchanged. She will follow up with us in clinic in 6 months. She knows to call for any concerns.

## 2024-08-02 DIAGNOSIS — N39.41 URGE INCONTINENCE: ICD-10-CM

## 2024-08-02 RX ORDER — MIRABEGRON 50 MG/1
50 TABLET, FILM COATED, EXTENDED RELEASE ORAL DAILY
Qty: 30 TABLET | Refills: 0 | OUTPATIENT
Start: 2024-08-02

## 2024-08-06 ENCOUNTER — APPOINTMENT (OUTPATIENT)
Dept: PHARMACY | Facility: HOSPITAL | Age: 69
End: 2024-08-06
Payer: MEDICARE

## 2024-08-06 DIAGNOSIS — N39.41 URGE INCONTINENCE: ICD-10-CM

## 2024-08-06 NOTE — PROGRESS NOTES
"  Patient ID: Amanda De Jesus is a 69 y.o. female who presents for No chief complaint on file..    Referring Provider: Janett Giles   Pt was referred for cost assistance for Myrbetriq     Preferred Pharmacy:    [id number] - [place], [place] - [address]  [address] [facility] [address]  Phone: [phone] Fax: [phone]    [facility] - [place], [place] - [address]  [address]  [facility] [address]  [facility] [address]  Phone: [phone] Fax: [phone]      Copay assistance:   Do you have copays on your medications? Yes  Do you have trouble affording your current medications? Yes     Patient Assistance Screening (VAF)    Patient verbally reports monthly or yearly income which is less than 400% federal poverty level   Application for program has been submitted for the following medications:   Myrbetriq   Patient has been informed that program team will be reaching out to them to discuss necessary documentation, instructed to answer phone/return voicemail.   Patient aware this process may take up to 6 weeks.   If approved medication must be filled through [facility] pharmacy and may be picked up or mailed to patient.       Subjective      Vaginal Symptoms  Vaginal Dryness: None   Dysuria (pain, burning, stinging, or itching with urination): None (only had burning when had UTI)  Vaginal and/or vulvar irritation/itching: No   Recurrent urinary tract infections: yes   No results found for: \"ESTRADIOLFRE\", \"PROGESTERONE\", \"ESTRADIOL\", \"FSH\"    Urinary Symptoms  Medications that may contribute to symptoms:   Diuretics - discussed   Any history of:   Narrow-angle glaucoma? No   Impaired gastric emptying? No   Urinary retention? No     If diabetes, blood sugar controlled? HbA1c 6.4%  Frequently of bowel movement? Couple times a day   Tobacco use? Yes, smoke half a pack a day   How many protective undergarments are you utilizing daily? Panty liners     What " "medications have been tried/stopped?   Oxybutynin/trospium - didn't work and nausea   Current medication? Myrbetriq 50 mg daily   When started? Restarted 1.5 months ago   Side effects? No, occasional dry mouth at night   Improvement in symptoms? It has helped improved night symptoms and reduced the about of night time awakenings        Cardiovascular Health  The 10-year ASCVD risk score (Lance ADAMS, et al., 2019) is: 37.9%    Values used to calculate the score:      Age: 69 years      Sex: Female      Is Non- : No      Diabetic: Yes      Tobacco smoker: Yes      Systolic Blood Pressure: 146 mmHg      Is BP treated: Yes      HDL Cholesterol: 53.3 mg/dL      Total Cholesterol: 158 mg/dL    Lab Results   Component Value Date    CHOL 158 02/27/2024     Lab Results   Component Value Date    HDL 53.3 02/27/2024     Lab Results   Component Value Date    LDLCALC 62 02/27/2024     Lab Results   Component Value Date    TRIG 214 (H) 02/27/2024     No components found for: \"CHOLHDL\"        Blood Sugar Balance  Lab Results   Component Value Date    GLUCOSE 106 (H) 02/27/2024    HGBA1C 6.4 (H) 02/27/2024    HGBA1C 6.4 (H) 02/27/2024    HGBA1C 6.6 (H) 10/23/2023    HGBA1C 6.6 (H) 10/23/2023     No results found for: \"LEPTIN\", \"INSULFAST\", \"GLUF\"      Thyroid  Lab Results   Component Value Date    TSH 1.77 10/23/2023    FREET4 1.24 06/16/2021       Iron Status  No results found for: \"IRON\", \"TIBC\", \"FERRITIN\"     Kidney Function  Lab Results   Component Value Date    GFRF 54 (A) 09/30/2022    CREATININE 1.19 (H) 02/27/2024       Potassium  Lab Results   Component Value Date    K 4.7 02/27/2024        Vitamin D3  Lab Results   Component Value Date    VITD25 39 08/05/2022       Current Outpatient Medications on File Prior to Visit   Medication Sig Dispense Refill    acetaminophen (Tylenol) 325 mg tablet Take 1 tablet (325 mg) by mouth every 6 hours if needed.      amLODIPine (Norvasc) 5 mg tablet Take 1 tablet " (5 mg) by mouth once daily in the morning. Take before meals.      ascorbic acid, vitamin C, 500 mg capsule Take by mouth. Take as directed      aspirin 81 mg EC tablet Take 1 tablet (81 mg) by mouth once daily.      atorvastatin (Lipitor) 80 mg tablet Take 1 tablet (80 mg) by mouth once daily.      cholecalciferol (Vitamin D-3) 50 mcg (2,000 unit) capsule Take 4,125 capsules (206,250 mcg) by mouth early in the morning..      CRANBERRY ORAL Take 2 capsules by mouth once daily.      cyanocobalamin (Vitamin B-12) 1,000 mcg tablet Take by mouth every other day.      cyclobenzaprine (Flexeril) 5 mg tablet Take 1 tablet (5 mg) by mouth as needed at bedtime.      ezetimibe (Zetia) 10 mg tablet Take 1 tablet (10 mg) by mouth once daily. 90 tablet 3    famotidine-Ca carb-mag hydrox (Pepcid Complete) -165 mg chewable tablet Chew 1 tablet every 12 hours if needed.      furosemide (Lasix) 40 mg tablet Take 1 tablet (40 mg) by mouth once daily. 90 tablet 3    gabapentin (Neurontin) 300 mg capsule Take 1 capsule (300 mg) by mouth 2 times a day. 60 capsule 1    [] HYDROcodone-acetaminophen (Norco) 5-325 mg tablet Take 1 tablet by mouth every 12 hours if needed for severe pain (7 - 10) for up to 28 days. Do not fill before July 3, 2024. 56 tablet 0    HYDROcodone-acetaminophen (Norco) 5-325 mg tablet Take 1 tablet by mouth every 12 hours if needed for severe pain (7 - 10) for up to 28 days. Do not fill before 2024. 56 tablet 0    losartan (Cozaar) 100 mg tablet Take 1 tablet (100 mg) by mouth once daily in the morning. Take before meals.      metFORMIN  mg 24 hr tablet Take 1 tablet (500 mg) by mouth once daily in the evening.      metoprolol succinate XL (Toprol-XL) 100 mg 24 hr tablet Take 1 tablet (100 mg) by mouth once daily.      mirabegron (Myrbetriq) 50 mg tablet extended release 24 hr 24 hr tablet Take 1 tablet (50 mg) by mouth once daily. 90 tablet 3    multivitamin tablet Take 1 tablet by  mouth once daily.      naloxone (Narcan) 4 mg/0.1 mL nasal spray Administer 1 spray (4 mg) into affected nostril(s) if needed for opioid reversal for up to 2 doses. May repeat every 2-3 minutes if needed, alternating nostrils, until medical assistance becomes available. 2 each 0    nystatin (Mycostatin) cream every 12 hours.      potassium chloride CR 10 mEq ER tablet Take 1 tablet (10 mEq) by mouth once daily.      varenicline (Chantix) 0.5 mg tablet Take 1 tablet (0.5 mg) by mouth 2 times a day. Take with full glass of water.      zinc gluconate 100 mg tablet Take by mouth. Zinc 100 MG Oral Tablet; TAKE AS DIRECTED.       No current facility-administered medications on file prior to visit.        Medication and allergy reconciliation completed     Drug Interactions   No significant drug interactions identified. Metoprolol and Myrbetriq noted but timed separately.     Assessment/Plan     Patient is experiencing urinary frequency, urgency, and incontinence. She report was on Myrbetriq previously and then was off of it for 3-4 months. During this time she was having accidents, increased frequency and urgency so Myrbetriq was resumed. Since resuming her symptoms have improved.   Has tried before Oxybutynin/trospium.  Patient plans to apply for [facility], pending [phone] via fax.     Myrbetriq  Discussed MOA: activates beta-3 adrenergic receptors in the bladder resulting in relaxation of the detrusor smooth muscle during the urine storage phase, thus increasing bladder capacity.  Provided education on administration (swallow whole with water; do not chew, divide, or crush) and potential side effects including but not limited to headache, nose or throat irritation, dry mouth, and constipation. Any signs or symptoms of angioedema- immediately discontinue use and seek immediate medical attention.   Monitor blood pressure and heart rate. May notice a slight increase. Please call me if blood pressure becomes >120/80 mmHg or pulse  significantly elevates from baseline.   BP Readings from Last 3 Encounters:   07/23/24 146/81   05/23/24 135/75   05/13/24 (!) 162/96     Caution with other meds that prolong QT interval- many drug:drug interactions   Patient is in agreement that they will notify me if starting any new medications  Efficacy is seen within 8 weeks; steady state achieved within 7 days.      LABS  Lab Results   Component Value Date    GFRF 54 (A) 09/30/2022     GFR 50 as of 2/27/2024      CONTINUE  Myrbetriq 50 mg once daily.      Follow-up: 9/3/24 @ 11 am      Time spent with pt: Total length of time 45 (minutes) of the encounter and more than 50% was spent counseling the patient.      Beryl Rodriguez, PharmD      Continue all meds under the continuation of care with the referring provider and clinical pharmacy team.    Verbal consent to manage patient's drug therapy was obtained from the patient and/or an individual authorized to act on behalf of a patient. They were informed they may decline to participate or withdraw from participation in pharmacy services at any time.

## 2024-08-19 DIAGNOSIS — N39.41 URGE INCONTINENCE: ICD-10-CM

## 2024-08-20 ENCOUNTER — APPOINTMENT (OUTPATIENT)
Dept: PAIN MEDICINE | Facility: CLINIC | Age: 69
End: 2024-08-20
Payer: MEDICARE

## 2024-08-20 DIAGNOSIS — M54.16 CHRONIC RADICULAR LUMBAR PAIN: ICD-10-CM

## 2024-08-20 DIAGNOSIS — M51.26 HERNIATED NUCLEUS PULPOSUS, L4-5: ICD-10-CM

## 2024-08-20 DIAGNOSIS — M51.27 OTHER INTERVERTEBRAL DISC DISPLACEMENT, LUMBOSACRAL REGION: Primary | ICD-10-CM

## 2024-08-20 DIAGNOSIS — Z79.891 LONG TERM (CURRENT) USE OF OPIATE ANALGESIC: ICD-10-CM

## 2024-08-20 DIAGNOSIS — M54.16 RIGHT LUMBAR RADICULOPATHY: ICD-10-CM

## 2024-08-20 DIAGNOSIS — G89.29 CHRONIC RADICULAR LUMBAR PAIN: ICD-10-CM

## 2024-08-20 DIAGNOSIS — M51.36 DEGENERATIVE DISC DISEASE, LUMBAR: ICD-10-CM

## 2024-08-20 PROCEDURE — 4010F ACE/ARB THERAPY RXD/TAKEN: CPT | Performed by: PHYSICAL MEDICINE & REHABILITATION

## 2024-08-20 PROCEDURE — 3061F NEG MICROALBUMINURIA REV: CPT | Performed by: PHYSICAL MEDICINE & REHABILITATION

## 2024-08-20 PROCEDURE — 99214 OFFICE O/P EST MOD 30 MIN: CPT | Performed by: PHYSICAL MEDICINE & REHABILITATION

## 2024-08-20 PROCEDURE — 3044F HG A1C LEVEL LT 7.0%: CPT | Performed by: PHYSICAL MEDICINE & REHABILITATION

## 2024-08-20 PROCEDURE — 3048F LDL-C <100 MG/DL: CPT | Performed by: PHYSICAL MEDICINE & REHABILITATION

## 2024-08-20 RX ORDER — GABAPENTIN 300 MG/1
300 CAPSULE ORAL 2 TIMES DAILY
Qty: 60 CAPSULE | Refills: 1 | Status: SHIPPED | OUTPATIENT
Start: 2024-08-20 | End: 2024-09-19

## 2024-08-20 RX ORDER — HYDROCODONE BITARTRATE AND ACETAMINOPHEN 5; 325 MG/1; MG/1
1 TABLET ORAL EVERY 12 HOURS PRN
Qty: 56 TABLET | Refills: 0 | Status: SHIPPED | OUTPATIENT
Start: 2024-09-09 | End: 2024-10-07

## 2024-08-20 RX ORDER — HYDROCODONE BITARTRATE AND ACETAMINOPHEN 5; 325 MG/1; MG/1
1 TABLET ORAL EVERY 12 HOURS PRN
Qty: 56 TABLET | Refills: 0 | Status: SHIPPED | OUTPATIENT
Start: 2024-10-07 | End: 2024-11-04

## 2024-08-20 NOTE — PROGRESS NOTES
Chief complaint  Back and lower limbs pain     History  Amanda De Jesus is back for pain management office visit  Continue with pain   Similar characteristics  as before  Pain is controlled with meds   Again today focus Long discussion about putting effort in cutting back on pain medications. Discussed about pain level changing and we do not know if the medications at this amount are still needed until we try and cut back slowly on pain medications. If the cut is tolerated then we continue. If cut not tolerated then, will go back on the pain medications level.  The goal from this is to keep the pain medications at the lowest effective dose.     realizes the interaction between the therapeutic classes including the respiratory depression and potential death       Pain level without medication is 8/10 , with the medication pain level 2/10.     The pain meds are helping control the pain and improving Activities of Daily living and quality of life and quality of sleep.    opioids treatment agreement Feb 2024  Pill count today, using count tray, and in front of patient :  28    pills , last fill was on 8/12  for 56 tabs,  the count is correct  Oarrs pulled and reviewed, no concerns  last urine toxicology testing earlier this year and it was compliant we will repeat  Xray updated spine   ORT Score is  0  Pain pathology and pain generators spine  Modalities tried injection, surgery, physical therapy, TENS unit, nonsteroidal anti-inflammatory medication       Review of Systems :  Denied any fever or chills. No weight loss and no night sweats. No cough or sputum production. No diarrhea   The constipation has been responding to fibers and over the counter medications.     No bladder and bowel incontinence and no other changes in bladder and bowel. No skin changes.  Reports tiredness and fatigability only if the pain is not controlled.     Denied opioids diversion and abuse and denies alcoholism. Denies overuse of the pain  "medications.  No reported euphoria sensation or getting a \"high\" on the pain medications.    The control of the pain with the pain medications is helping the control of the symptoms and allowing the function and activities of daily living, enjoyment of life, improving the quality of life and sleep with less interruption by the pain. The goal is symptomatic control of the nonmalignant chronic pain and not to repair the permanent damage in the tissues inducing the chronic pain conditions. We are aiming to shift the focus from the nonmalignant chronic pain to other aspects of life by symptomatically treating this chronic pain. If this pain is not treated it will lead to major morbidity and it is also associated with increased risks of mortality. The patient understands those very clearly and also understand high risks of morbidity and mortality if not strictly adherent to the treatment recommendations and reporting any associated side effects. Also patient understand the full responsibility associated with these medications to avoid abuse or overuse or any use of these medications for anything besides treating the patient's own chronic pain and nothing else under any circumstances.        Physical examination  Awake, alert and oriented for time place and persons   declined Chaperone for the visit and was adequately  draped for the exam.    Mann negative   widened perimeter of stance    No cane  SLR increased back pain     Diagnosis  Problem List Items Addressed This Visit       Degenerative disc disease, lumbar    Relevant Medications    gabapentin (Neurontin) 300 mg capsule    HYDROcodone-acetaminophen (Norco) 5-325 mg tablet (Start on 9/9/2024)    HYDROcodone-acetaminophen (Norco) 5-325 mg tablet (Start on 10/7/2024)    Herniated nucleus pulposus, L4-5    Relevant Medications    gabapentin (Neurontin) 300 mg capsule    HYDROcodone-acetaminophen (Norco) 5-325 mg tablet (Start on 9/9/2024)    " HYDROcodone-acetaminophen (Norco) 5-325 mg tablet (Start on 10/7/2024)    Other intervertebral disc displacement, lumbosacral region - Primary    Relevant Medications    gabapentin (Neurontin) 300 mg capsule    HYDROcodone-acetaminophen (Norco) 5-325 mg tablet (Start on 9/9/2024)    HYDROcodone-acetaminophen (Norco) 5-325 mg tablet (Start on 10/7/2024)    Right lumbar radiculopathy    Relevant Medications    gabapentin (Neurontin) 300 mg capsule    HYDROcodone-acetaminophen (Norco) 5-325 mg tablet (Start on 9/9/2024)    HYDROcodone-acetaminophen (Norco) 5-325 mg tablet (Start on 10/7/2024)    Chronic radicular lumbar pain    Relevant Medications    gabapentin (Neurontin) 300 mg capsule    HYDROcodone-acetaminophen (Norco) 5-325 mg tablet (Start on 9/9/2024)    HYDROcodone-acetaminophen (Norco) 5-325 mg tablet (Start on 10/7/2024)    Long term (current) use of opiate analgesic    Relevant Medications    gabapentin (Neurontin) 300 mg capsule    HYDROcodone-acetaminophen (Norco) 5-325 mg tablet (Start on 9/9/2024)    HYDROcodone-acetaminophen (Norco) 5-325 mg tablet (Start on 10/7/2024)        Plan  Reviewed the pain generators.  Went over the types of pain with neuropathic and nociceptive and different pathologies and therapeutic modalities. Discussed the mechanism of action of interventions from acupuncture, physical therapy , regular exercises, injections, botox, spinal cord stimulation, and role of surgery     Went over pathology of the intervertebral disc displacement and the anatomical relation to the Nerve roots and relation to the radicular symptoms. Went over treatment modalities with conservative treatment including acupuncture   and epidural steroid injection with fluoroscopy guidance and last resort of surgery    Based on the above findings and the clinical response to the opioids medications and improvement of the activities of daily living, sleep, and work performance. We made this complex decision to  continue the opioids therapy in light of the evidence of the patient's responsibility in using the pain medications as prescribed for the nonmalignant chronic pain condition. We discussed about the use of the pain medications to treat the symptoms of chronic nonmalignant pain and we are not trying the repair the permanent damage in the tissues, rather we are trying to control the symptoms induced by the permanent damage to the tissues inducing the chronic pain condition and resulting disability. I explained the difference and discussed it with the patient and stressed the importance of knowing the difference especially because of the potential side effects and the potential addicting effect and habit forming nature of the dangerous drugs we are using to treat the symptoms of the chronic pain.      We discussed that we are prescribing the medications on good manas and legitimate medical reason.     We reviewed the side effects and precautions of opioids prescriptions as discussed in the opioids treatment agreement.    realizes the interaction between the therapeutic classes including the respiratory depression and potential death     Random drug testing   we will submit     Consider injection for aggravation    Again Long discussion about putting effort in cutting back on pain medications. Discussed about pain level changing and we do not know if the medications at this amount are still needed until we try and cut back slowly on pain medications. If the cut is tolerated then we continue. If cut not tolerated then, will go back on the pain medications level.  The goal from this is to keep the pain medications at the lowest effective dose.   realizes the interaction between the therapeutic classes including the respiratory depression and potential death     Discussed about NSAIDS and I explained about the opioids sparing effect to allow keeping the opioids dose at minimal effective dose.   I went over the potential  side effects of the NSAIDS on the gastrointestinal, renal and cardiovascular systems.      I detailed the side effects from the acetaminophen in the medication and made aware of those. I also explained about the cumulative effects on the organs and mainly the liver.     Given the opioids therapy , we discussed about the risk for accidental over dose on the pain medications, either for patient or other household. I went over the mechanism of action and mode of use of the Naloxone according to the  recommendations. I will provide a prescription for a kit.     Follow-up 8 weeks or earlier if needed     The level of clinical decision making in this office visit,  is high, given the high risks of complications with the morbidity and mortality due to the fact that acute and chronic pain may pose a threat to life and bodily function, if under treated, poorly treated, or with failure to maintain adequate treatment and timely medical follow up. Additionally over treatment has its own set of complications including overdosing on the pain medications and also the habit forming potentials with the use of the medications used to treat chronic painful conditions including therapeutic classes classified as dangerous medications. Given the serious and fluctuating nature of pain level and instensity with extensive consideration for whenever pain changes, there is always the risk of prolonged functional impairment requiring close patient monitoring with regular assessments and reassessments and high level medical decision making at every office visit. The amount and complexity of data reviewed is high given the patient clinical presentation, labs,  data, radiology reports, and other tests as discussed during office visits. Pertinent data whether positive or negative were taken in consideration in the process of making this high level medical decision.

## 2024-08-21 RX ORDER — MIRABEGRON 50 MG/1
50 TABLET, FILM COATED, EXTENDED RELEASE ORAL DAILY
Qty: 30 TABLET | Refills: 0 | OUTPATIENT
Start: 2024-08-21

## 2024-08-30 ENCOUNTER — TELEPHONE (OUTPATIENT)
Dept: UROLOGY | Facility: CLINIC | Age: 69
End: 2024-08-30
Payer: MEDICARE

## 2024-08-30 DIAGNOSIS — R30.0 DYSURIA: Primary | ICD-10-CM

## 2024-08-30 DIAGNOSIS — N39.0 RECURRENT UTI: Primary | ICD-10-CM

## 2024-08-30 RX ORDER — NITROFURANTOIN 25; 75 MG/1; MG/1
100 CAPSULE ORAL 2 TIMES DAILY
Qty: 14 CAPSULE | Refills: 0 | Status: SHIPPED | OUTPATIENT
Start: 2024-08-30 | End: 2024-09-06

## 2024-08-30 NOTE — TELEPHONE ENCOUNTER
Pt left message asking if we can send the Myrbetriq to the  low cost pharmacy as she has her fu appt with [facility] on Tues to see if she qualifies. She never did get the meds through [facility].     Pt also states she thinks she has a UTI and she is experiencing dysuria and is asking if an order can be placed in her chart so she can give a urine at the [facility] lab tomorrow morning. She is very busy with her daughter and her kids as her son n law has cancer and has been in and out of the hospital. Pt is scheduled for a phone call visit for Wed. Sept 4th. Please advise, thanks.

## 2024-09-03 ENCOUNTER — APPOINTMENT (OUTPATIENT)
Dept: PHARMACY | Facility: HOSPITAL | Age: 69
End: 2024-09-03
Payer: MEDICARE

## 2024-09-03 DIAGNOSIS — N39.41 URGE INCONTINENCE: ICD-10-CM

## 2024-09-03 PROCEDURE — RXMED WILLOW AMBULATORY MEDICATION CHARGE

## 2024-09-03 RX ORDER — MIRABEGRON 50 MG/1
50 TABLET, EXTENDED RELEASE ORAL DAILY
Qty: 90 TABLET | Refills: 3 | Status: SHIPPED | OUTPATIENT
Start: 2024-09-03 | End: 2025-09-03

## 2024-09-03 NOTE — PROGRESS NOTES
"  Patient ID: Amanda De Jesus is a 69 y.o. female who presents for No chief complaint on file..    Referring Provider: Janett Giles   Pt was referred for cost assistance for Myrbetriq     Preferred Pharmacy:    [id number] - [place], [place] - [address]  [address] [address]  Phone: [phone] Fax: [phone]    [facility] - [place], [place] - [address]  [address]  [facility] [address]  [facility] [address]  Phone: [phone] Fax: [phone]      Copay assistance:   Do you have copays on your medications? Yes  Do you have trouble affording your current medications? Yes     Patient Assistance Screening (VAF)    Patient verbally reports monthly or yearly income which is less than 400% federal poverty level   Application for program has been submitted for the following medications:   Myrbetriq   Patient has been informed that program team will be reaching out to them to discuss necessary documentation, instructed to answer phone/return voicemail.   Patient aware this process may take up to 6 weeks.   If approved medication must be filled through [facility] pharmacy and may be picked up or mailed to patient.       Subjective      Vaginal Symptoms  Vaginal Dryness: None   Dysuria (pain, burning, stinging, or itching with urination): None (only had burning when had UTI)  Vaginal and/or vulvar irritation/itching: No   Recurrent urinary tract infections: yes   No results found for: \"ESTRADIOLFRE\", \"PROGESTERONE\", \"ESTRADIOL\", \"FSH\"    Urinary Symptoms  Medications that may contribute to symptoms:   Diuretics - discussed previously   Any history of:   Narrow-angle glaucoma? No   Impaired gastric emptying? No   Urinary retention? No     If diabetes, blood sugar controlled? HbA1c 6.4%  Frequently of bowel movement? Couple times a day   Tobacco use? Yes, smoke half a pack a day   How many protective undergarments are you utilizing daily? Panty liners     What " "medications have been tried/stopped?   Oxybutynin/trospium - didn't work and nausea   Current medication? Myrbetriq 50 mg daily   When started? Restarted in June but has been out the last couple days   Side effects? Occasional constipation - patient eats more fruits and it resolves    Improvement in symptoms? Yes, it has improved her urinary frequency and incontinence. Since being out of the medication the last couple of days, she has noticed more frequency and accidents      Cardiovascular Health  The 10-year ASCVD risk score (Lance ADAMS, et al., 2019) is: 37.9%    Values used to calculate the score:      Age: 69 years      Sex: Female      Is Non- : No      Diabetic: Yes      Tobacco smoker: Yes      Systolic Blood Pressure: 146 mmHg      Is BP treated: Yes      HDL Cholesterol: 53.3 mg/dL      Total Cholesterol: 158 mg/dL    Lab Results   Component Value Date    CHOL 158 02/27/2024     Lab Results   Component Value Date    HDL 53.3 02/27/2024     Lab Results   Component Value Date    LDLCALC 62 02/27/2024     Lab Results   Component Value Date    TRIG 214 (H) 02/27/2024     No components found for: \"CHOLHDL\"        Blood Sugar Balance  Lab Results   Component Value Date    GLUCOSE 106 (H) 02/27/2024    HGBA1C 6.4 (H) 02/27/2024    HGBA1C 6.4 (H) 02/27/2024    HGBA1C 6.6 (H) 10/23/2023    HGBA1C 6.6 (H) 10/23/2023     No results found for: \"LEPTIN\", \"INSULFAST\", \"GLUF\"      Thyroid  Lab Results   Component Value Date    TSH 1.77 10/23/2023    FREET4 1.24 06/16/2021       Iron Status  No results found for: \"IRON\", \"TIBC\", \"FERRITIN\"     Kidney Function  Lab Results   Component Value Date    GFRF 54 (A) 09/30/2022    CREATININE 1.19 (H) 02/27/2024       Potassium  Lab Results   Component Value Date    K 4.7 02/27/2024        Vitamin D3  Lab Results   Component Value Date    VITD25 39 08/05/2022       Current Outpatient Medications on File Prior to Visit   Medication Sig Dispense Refill    " acetaminophen (Tylenol) 325 mg tablet Take 1 tablet (325 mg) by mouth every 6 hours if needed.      amLODIPine (Norvasc) 5 mg tablet Take 1 tablet (5 mg) by mouth once daily in the morning. Take before meals.      ascorbic acid, vitamin C, 500 mg capsule Take by mouth. Take as directed      aspirin 81 mg EC tablet Take 1 tablet (81 mg) by mouth once daily.      atorvastatin (Lipitor) 80 mg tablet Take 1 tablet (80 mg) by mouth once daily.      cholecalciferol (Vitamin D-3) 50 mcg (2,000 unit) capsule Take 4,125 capsules (206,250 mcg) by mouth early in the morning..      CRANBERRY ORAL Take 2 capsules by mouth once daily.      cyanocobalamin (Vitamin B-12) 1,000 mcg tablet Take by mouth once daily.      cyclobenzaprine (Flexeril) 5 mg tablet Take 1 tablet (5 mg) by mouth as needed at bedtime.      d-mannose 500 mg capsule Take 1,500 mg by mouth 2 times a day.      ezetimibe (Zetia) 10 mg tablet Take 1 tablet (10 mg) by mouth once daily. 90 tablet 3    famotidine-Ca carb-mag hydrox (Pepcid Complete) -165 mg chewable tablet Chew 1 tablet every 12 hours if needed.      furosemide (Lasix) 40 mg tablet Take 1 tablet (40 mg) by mouth once daily. 90 tablet 3    gabapentin (Neurontin) 300 mg capsule Take 1 capsule (300 mg) by mouth 2 times a day. 60 capsule 1    [START ON 9/9/2024] HYDROcodone-acetaminophen (Norco) 5-325 mg tablet Take 1 tablet by mouth every 12 hours if needed for severe pain (7 - 10) for up to 28 days. Do not fill before September 9, 2024. 56 tablet 0    [START ON 10/7/2024] HYDROcodone-acetaminophen (Norco) 5-325 mg tablet Take 1 tablet by mouth every 12 hours if needed for severe pain (7 - 10) for up to 28 days. Do not fill before October 7, 2024. 56 tablet 0    losartan (Cozaar) 100 mg tablet Take 1 tablet (100 mg) by mouth once daily in the morning. Take before meals.      metFORMIN  mg 24 hr tablet Take 1 tablet (500 mg) by mouth once daily in the evening.      metoprolol succinate XL  (Toprol-XL) 100 mg 24 hr tablet Take 1 tablet (100 mg) by mouth once daily.      mirabegron (Myrbetriq) 50 mg tablet extended release 24 hr 24 hr tablet Take 1 tablet (50 mg) by mouth once daily. 90 tablet 3    multivitamin tablet Take 1 tablet by mouth once daily.      naloxone (Narcan) 4 mg/0.1 mL nasal spray Administer 1 spray (4 mg) into affected nostril(s) if needed for opioid reversal for up to 2 doses. May repeat every 2-3 minutes if needed, alternating nostrils, until medical assistance becomes available. 2 each 0    nitrofurantoin, macrocrystal-monohydrate, (Macrobid) 100 mg capsule Take 1 capsule (100 mg) by mouth 2 times a day for 7 days. 14 capsule 0    potassium chloride CR 10 mEq ER tablet Take 1 tablet (10 mEq) by mouth once daily.      varenicline (Chantix) 0.5 mg tablet Take 1 tablet (0.5 mg) by mouth 2 times a day. Take with full glass of water.      zinc gluconate 100 mg tablet Take by mouth once daily. Zinc 100 MG Oral Tablet; TAKE AS DIRECTED.       No current facility-administered medications on file prior to visit.        Medication and allergy reconciliation completed     Drug Interactions   No significant drug interactions identified. Metoprolol and Myrbetriq noted but timed separately.     Assessment/Plan     Patient is experiencing urinary frequency, urgency, and incontinence. She has run out of Myrbetriq for the last 8-10 days. Since running out, she has noticed she is having more accidents and her urinary frequency has increased. She reports doing well on the medication before running out. Her occasional constipation is managed by her fruit intake. We discussed that if increasing dietary fiber/fruit does not resolve symptoms she could try OTC products such as Miralax. Patient acknowledged.   Has tried before Oxybutynin/trospium.  Patient approved for [facility]    Myrbetriq  Discussed MOA: activates beta-3 adrenergic receptors in the bladder resulting in relaxation of the detrusor smooth  muscle during the urine storage phase, thus increasing bladder capacity.  Provided education on administration (swallow whole with water; do not chew, divide, or crush) and potential side effects including but not limited to headache, nose or throat irritation, dry mouth, and constipation. Any signs or symptoms of angioedema- immediately discontinue use and seek immediate medical attention.   Monitor blood pressure and heart rate. May notice a slight increase. Please call me if blood pressure becomes >120/80 mmHg or pulse significantly elevates from baseline.   BP Readings from Last 3 Encounters:   07/23/24 146/81   05/23/24 135/75   05/13/24 (!) 162/96     Caution with other meds that prolong QT interval- many drug:drug interactions   Patient is in agreement that they will notify me if starting any new medications  Efficacy is seen within 8 weeks; steady state achieved within 7 days.      LABS  Lab Results   Component Value Date    GFRF 54 (A) 09/30/2022     GFR 50 as of 2/27/2024      CONTINUE  Myrbetriq 50 mg once daily.      Follow-up: 3/4/25 @ 11 am      Time spent with pt: Total length of time 20 (minutes) of the encounter and more than 50% was spent counseling the patient.     Patient Assistance Program Approval:     We are pleased to inform you that your application for assistance has been approved.     This approval is valid through  9/3/2025  as long as the following criteria continue to be satisfied:     Your medication (Myrbetriq) remains covered under your current insurance plan.   Your prescriber does not discontinue therapy.   You do not seek reimbursement from any other private or government-funded programs for the  medication.    Under this program, the pharmacy will first bill your insurance plan for your indemnified specified medication. The [employer] Assistance Fund will then offset your copay balance, so that your out-of pocket expense for your specialty medication will be $0.00.      Beryl TRAN  Saul Rodriguez      Continue all meds under the continuation of care with the referring provider and clinical pharmacy team.    Verbal consent to manage patient's drug therapy was obtained from the patient and/or an individual authorized to act on behalf of a patient. They were informed they may decline to participate or withdraw from participation in pharmacy services at any time.

## 2024-09-04 ENCOUNTER — LAB (OUTPATIENT)
Dept: LAB | Facility: LAB | Age: 69
End: 2024-09-04
Payer: MEDICARE

## 2024-09-04 ENCOUNTER — TELEPHONE (OUTPATIENT)
Dept: UROLOGY | Facility: CLINIC | Age: 69
End: 2024-09-04

## 2024-09-04 ENCOUNTER — APPOINTMENT (OUTPATIENT)
Dept: UROLOGY | Facility: CLINIC | Age: 69
End: 2024-09-04
Payer: MEDICARE

## 2024-09-04 DIAGNOSIS — R30.0 DYSURIA: ICD-10-CM

## 2024-09-04 LAB
APPEARANCE UR: ABNORMAL
BILIRUB UR STRIP.AUTO-MCNC: NEGATIVE MG/DL
COLOR UR: YELLOW
GLUCOSE UR STRIP.AUTO-MCNC: NORMAL MG/DL
HOLD SPECIMEN: NORMAL
KETONES UR STRIP.AUTO-MCNC: NEGATIVE MG/DL
LEUKOCYTE ESTERASE UR QL STRIP.AUTO: ABNORMAL
MUCOUS THREADS #/AREA URNS AUTO: ABNORMAL /LPF
NITRITE UR QL STRIP.AUTO: NEGATIVE
PH UR STRIP.AUTO: 6.5 [PH]
PROT UR STRIP.AUTO-MCNC: ABNORMAL MG/DL
RBC # UR STRIP.AUTO: ABNORMAL /UL
RBC #/AREA URNS AUTO: >20 /HPF
SP GR UR STRIP.AUTO: 1.02
SQUAMOUS #/AREA URNS AUTO: ABNORMAL /HPF
UROBILINOGEN UR STRIP.AUTO-MCNC: NORMAL MG/DL
WBC #/AREA URNS AUTO: >50 /HPF
WBC CLUMPS #/AREA URNS AUTO: ABNORMAL /HPF

## 2024-09-04 PROCEDURE — 87086 URINE CULTURE/COLONY COUNT: CPT

## 2024-09-04 PROCEDURE — 81001 URINALYSIS AUTO W/SCOPE: CPT

## 2024-09-04 NOTE — TELEPHONE ENCOUNTER
Pt left message apologizing for missing her phone call visit with Janett today. She fell asleep and didn't hear her phone ring. She has been helping with her grandkids a lot sine her son yehuda sanchez is in and out of the hospital with cancer. She states she was feeling better but then just today she feels terrible again and she did drop a urine off around noon. I will call her to reschedule her virtual appt, thanks.

## 2024-09-05 ENCOUNTER — PHARMACY VISIT (OUTPATIENT)
Dept: PHARMACY | Facility: CLINIC | Age: 69
End: 2024-09-05
Payer: COMMERCIAL

## 2024-09-05 LAB — BACTERIA UR CULT: NORMAL

## 2024-09-06 ENCOUNTER — TELEPHONE (OUTPATIENT)
Dept: UROLOGY | Facility: CLINIC | Age: 69
End: 2024-09-06
Payer: MEDICARE

## 2024-09-06 NOTE — TELEPHONE ENCOUNTER
Patient returned your call and left voicemail.  I called patient and left voicemail that per your note that you were calling her to reschedule her virtual appt that she missed. It looks like she is rescheduled for 9/23/2024 @ 1:20pm. I told her if she needs anything further to call us back.

## 2024-09-23 ENCOUNTER — APPOINTMENT (OUTPATIENT)
Dept: UROLOGY | Facility: CLINIC | Age: 69
End: 2024-09-23
Payer: MEDICARE

## 2024-09-23 DIAGNOSIS — N39.41 URGE INCONTINENCE: Primary | ICD-10-CM

## 2024-09-23 DIAGNOSIS — R35.0 FREQUENCY OF URINATION: ICD-10-CM

## 2024-09-23 DIAGNOSIS — R35.1 NOCTURIA: ICD-10-CM

## 2024-09-23 DIAGNOSIS — N39.0 RECURRENT UTI: ICD-10-CM

## 2024-09-23 PROCEDURE — 4010F ACE/ARB THERAPY RXD/TAKEN: CPT | Performed by: NURSE PRACTITIONER

## 2024-09-23 PROCEDURE — 99214 OFFICE O/P EST MOD 30 MIN: CPT | Performed by: NURSE PRACTITIONER

## 2024-09-23 PROCEDURE — 3061F NEG MICROALBUMINURIA REV: CPT | Performed by: NURSE PRACTITIONER

## 2024-09-23 PROCEDURE — 3048F LDL-C <100 MG/DL: CPT | Performed by: NURSE PRACTITIONER

## 2024-09-23 PROCEDURE — 3044F HG A1C LEVEL LT 7.0%: CPT | Performed by: NURSE PRACTITIONER

## 2024-09-23 NOTE — PROGRESS NOTES
09/23/24   [id number]    Follow up UTI, OAB    Patient agreed to virtual visit from her home to my office in [place], [place]     Subjective      HPI Amanda De Jesus is a 69 y.o. female who presents for follow up recurrent UTIs. Last seen 4/18/24;     No longer urgency, except still at night waking up wet at night on the myrbetriq 50 mg ( pharmacy assistant Keshia); monitoring intake; up once at night sometimes twice; already starting to urinate;     Normal cystoscopy w Dr. Noble on 9/22/21    Dmanose, Estrogen vaginal cream    Urine 9/4/24 appeared possible UTI w wbc >50/hpf and rbc > 20 /hpf, squamous epi cells 10-25 noted and mucous; appeared contaminated;     Antibiotics two days and felt better, feels fine now; no fever, no chills, no urgency;     didn't tolerate nightly prophylactic abx caused nausea (macrodantin and trimethoprim);     4/16/24 CT angio abdomen w/wo IV  Aneurysmal dilatation the maximal diameter inferior to the renal arteries as described and similar to the prior exam. Prominent mural thrombus and atherosclerotic changes as described.     Scheduled for CT Nov 1st to compare, ordered by vascular;     Son in law very sick w cancer and chemo; she helps over night 1-2 x per week; she is helping take care of the kids;  5 grandchildren ages 8-18;     Last visit: UA neg today, PVR 0 cc  2/27/24 Creatinine 1.19, similar range to what it has been past couple yrs    1/5/24 klebsiella+ urine culture  9/18/23 Ecoli + urine culture  4/25/23 Enterobacter +urine culture     Cysto by Dr. Noble 9/22/21 normal  8/15/21 CT abd/pel w IV: atrophic left kidney, right kidney unremarkable;   8/20/2022 GUSTAVO no hydro, similar finding to CT w left atrophic kidney;     PMH: HTN, DMT2, CAD, Solitary kidney, dyslipidemia; sciatica;  PSH: cholecystectomy, knee and hip replacements  FH: mother breast cancer  SH: has smoked 40+ yrs 1/2-1 ppd     exam on 9/14/21 stage I cystocele, stage II rectocele; no uterine descent noted,  moderate vaginal atrophy     Objective     There were no vitals taken for this visit.   Physical Exam  General: Appears comfortable and in no apparent distress, well nourished  Head: Normocephalic, atraumatic  Neck: trachea midline  Respiratory: respirations unlabored, no wheezes, and no use of accessory muscles  Cardiovascular: at rest no dyspnea, well perfused  Skin: no visible rashes or lesions  Neurologic: grossly intact, oriented to person, place, and time  Psychiatric: mood and affect appropriate  Musculoskeletal: in chair for appt. no difficulty w upper body movement    Assessment/Plan   Problem List Items Addressed This Visit    None  Visit Diagnoses       Urge incontinence    -  Primary    Relevant Orders    Urinalysis with Reflex Culture and Microscopic    Frequency of urination        Recurrent UTI        Nocturia                Orders Placed This Encounter   Procedures    Urinalysis with Reflex Culture and Microscopic     Standing Status:   Standing     Number of Occurrences:   3     Standing Expiration Date:   9/23/2025     Order Specific Question:   Release result to [facility]     Answer:   Immediate [1]      Dmanose, vaginal estrogen    Urine repeat at lab after a shower    Peyton Sapola switch to gemtesa from [employer]    Appt. W Dr. Gould talk about Botox, [phone], [facility]     Nurse line [phone]  Follow up w me after botox or after starts Gemtesa PVR         Janett Giles, APRN-CNP  Lab Results   Component Value Date    GLUCOSE 106 (H) 02/27/2024    CALCIUM 9.7 02/27/2024     02/27/2024    K 4.7 02/27/2024    CO2 28 02/27/2024     02/27/2024    BUN 23 02/27/2024    CREATININE 1.19 (H) 02/27/2024

## 2024-09-23 NOTE — PATIENT INSTRUCTIONS
Dmanose, vaginal estrogen    Urine repeat at lab after a shower    Peyton Sapola switch to gemtesa from Myrbetriq    Appt. W Dr. Gould talk about Botox, [phone], [facility]     Nurse line [phone]  Follow up w me after botox or after starts Gemtesa PVR

## 2024-10-01 ENCOUNTER — APPOINTMENT (OUTPATIENT)
Dept: PAIN MEDICINE | Facility: CLINIC | Age: 69
End: 2024-10-01
Payer: MEDICARE

## 2024-10-01 DIAGNOSIS — Z79.891 LONG TERM (CURRENT) USE OF OPIATE ANALGESIC: ICD-10-CM

## 2024-10-01 DIAGNOSIS — M51.26 HERNIATED NUCLEUS PULPOSUS, L4-5: ICD-10-CM

## 2024-10-01 DIAGNOSIS — M51.27 OTHER INTERVERTEBRAL DISC DISPLACEMENT, LUMBOSACRAL REGION: ICD-10-CM

## 2024-10-01 DIAGNOSIS — Z79.891 LONG TERM CURRENT USE OF OPIATE ANALGESIC: Primary | ICD-10-CM

## 2024-10-01 DIAGNOSIS — M51.360 DEGENERATION OF INTERVERTEBRAL DISC OF LUMBAR REGION WITH DISCOGENIC BACK PAIN: ICD-10-CM

## 2024-10-01 DIAGNOSIS — M54.16 RIGHT LUMBAR RADICULOPATHY: ICD-10-CM

## 2024-10-01 DIAGNOSIS — M51.369 DEGENERATIVE DISC DISEASE, LUMBAR: ICD-10-CM

## 2024-10-01 DIAGNOSIS — G89.29 CHRONIC RADICULAR LUMBAR PAIN: ICD-10-CM

## 2024-10-01 DIAGNOSIS — M54.16 CHRONIC RADICULAR LUMBAR PAIN: ICD-10-CM

## 2024-10-01 PROCEDURE — 4010F ACE/ARB THERAPY RXD/TAKEN: CPT | Performed by: PHYSICAL MEDICINE & REHABILITATION

## 2024-10-01 PROCEDURE — 3044F HG A1C LEVEL LT 7.0%: CPT | Performed by: PHYSICAL MEDICINE & REHABILITATION

## 2024-10-01 PROCEDURE — 99214 OFFICE O/P EST MOD 30 MIN: CPT | Performed by: PHYSICAL MEDICINE & REHABILITATION

## 2024-10-01 PROCEDURE — 3061F NEG MICROALBUMINURIA REV: CPT | Performed by: PHYSICAL MEDICINE & REHABILITATION

## 2024-10-01 PROCEDURE — 3048F LDL-C <100 MG/DL: CPT | Performed by: PHYSICAL MEDICINE & REHABILITATION

## 2024-10-01 RX ORDER — CYCLOBENZAPRINE HCL 5 MG
5 TABLET ORAL NIGHTLY PRN
Qty: 30 TABLET | Refills: 2 | Status: SHIPPED | OUTPATIENT
Start: 2024-10-01 | End: 2024-10-31

## 2024-10-01 RX ORDER — HYDROCODONE BITARTRATE AND ACETAMINOPHEN 5; 325 MG/1; MG/1
1 TABLET ORAL EVERY 12 HOURS PRN
Qty: 56 TABLET | Refills: 0 | Status: SHIPPED | OUTPATIENT
Start: 2024-11-04 | End: 2024-12-02

## 2024-10-01 RX ORDER — GABAPENTIN 300 MG/1
300 CAPSULE ORAL 2 TIMES DAILY
Qty: 60 CAPSULE | Refills: 2 | Status: SHIPPED | OUTPATIENT
Start: 2024-10-01 | End: 2024-10-31

## 2024-10-01 RX ORDER — SODIUM CHLORIDE, SODIUM LACTATE, POTASSIUM CHLORIDE, CALCIUM CHLORIDE 600; 310; 30; 20 MG/100ML; MG/100ML; MG/100ML; MG/100ML
20 INJECTION, SOLUTION INTRAVENOUS CONTINUOUS
OUTPATIENT
Start: 2024-10-01

## 2024-10-01 NOTE — PROGRESS NOTES
Chief complaint  Back and lower limbs pain     History  Amanda De Jesus is back for pain management office visit  Having pain in the back and lower limbs with aggravation  lately.The pain is interfering with activities of daily living, quality of life and quality of sleep. It is limiting the functions and everything takes longer to complete because of the slowing related to the pain. Movements are cautious to avoid aggravation of the symptoms.   Back pain across. Pain in the back on the lateral legs and that is The pain burning and tingling on a continuous fashion. The pain goes in aggravation intermittently with sharp lancinating, electrical like jolts and sensations. These are felt on the skin and deeper in the tissues.  The pain is affected with colder weather and with wet and humid conditions.     Using meds sparingly and trying to cut back further      Pain level without medication is 8/10 , with the medication pain level 4 to 5/10. Bc of hte aggravation      The pain meds are helping control the pain and improving Activities of Daily living and quality of life and quality of sleep.    opioids treatment agreement feb 2024  Pill count today, using count tray, and in front of patient :  18    pills , last fill was on 9/9  for 56 tabs,  the count is correct  Oarrs pulled and reviewed, no concerns  last urine toxicology testing earlier this year and it was compliant we will repeat  Xray updated spine   ORT Score is  0  Pain pathology and pain generators spine   Modalities tried injection, surgery, physical therapy, TENS unit, nonsteroidal anti-inflammatory medication       Review of Systems :  Denied any fever or chills. No weight loss and no night sweats. No cough or sputum production. No diarrhea   The constipation has been responding to fibers and over the counter medications.     No bladder and bowel incontinence and no other changes in bladder and bowel. No skin changes.  Reports tiredness and fatigability only  "if the pain is not controlled.     Denied opioids diversion and abuse and denies alcoholism. Denies overuse of the pain medications.  No reported euphoria sensation or getting a \"high\" on the pain medications.    The control of the pain with the pain medications is helping the control of the symptoms and allowing the function and activities of daily living, enjoyment of life, improving the quality of life and sleep with less interruption by the pain. The goal is symptomatic control of the nonmalignant chronic pain and not to repair the permanent damage in the tissues inducing the chronic pain conditions. We are aiming to shift the focus from the nonmalignant chronic pain to other aspects of life by symptomatically treating this chronic pain. If this pain is not treated it will lead to major morbidity and it is also associated with increased risks of mortality. The patient understands those very clearly and also understand high risks of morbidity and mortality if not strictly adherent to the treatment recommendations and reporting any associated side effects. Also patient understand the full responsibility associated with these medications to avoid abuse or overuse or any use of these medications for anything besides treating the patient's own chronic pain and nothing else under any circumstances.        Physical examination  Awake, alert and oriented for time place and persons   declined Chaperone for the visit and was adequately  draped for the exam.    There is decreased sensory to light touch on the lateral aspects of the thighs and around the   knee going down to the lateral aspect of the legs to the   lateral malleoli into the dorsum of the feet..    Deep tendon reflexes is present for the patellar tendons bilaterally.  Achilles reflexes are present bilaterally and symmetric.    Medial Hamstrings reflex is decreased bilaterally  Plantar cutaneous are downgoing.  Ankle dorsiflexion is 5/5 bilaterally.  Plantar " flexion of the ankles are 5/5 bilaterally.  Big toe extension is 4/5 bilaterally  Negative Tinel's sign over the right peroneal nerve at the fibular neck.  Mann sign for axial loading and global rotation are negative.  No aberrant pain behavior.     Diagnosis  Problem List Items Addressed This Visit       Degenerative disc disease, lumbar    Relevant Medications    HYDROcodone-acetaminophen (Norco) 5-325 mg tablet (Start on 11/4/2024)    gabapentin (Neurontin) 300 mg capsule    cyclobenzaprine (Flexeril) 5 mg tablet    Herniated nucleus pulposus, L4-5    Relevant Medications    HYDROcodone-acetaminophen (Norco) 5-325 mg tablet (Start on 11/4/2024)    gabapentin (Neurontin) 300 mg capsule    cyclobenzaprine (Flexeril) 5 mg tablet    Other Relevant Orders    FL pain management    Epidural Steroid Injection    Other intervertebral disc displacement, lumbosacral region    Relevant Medications    HYDROcodone-acetaminophen (Norco) 5-325 mg tablet (Start on 11/4/2024)    gabapentin (Neurontin) 300 mg capsule    cyclobenzaprine (Flexeril) 5 mg tablet    Right lumbar radiculopathy    Relevant Medications    HYDROcodone-acetaminophen (Norco) 5-325 mg tablet (Start on 11/4/2024)    gabapentin (Neurontin) 300 mg capsule    cyclobenzaprine (Flexeril) 5 mg tablet    Other Relevant Orders    FL pain management    Epidural Steroid Injection    Chronic radicular lumbar pain    Relevant Medications    HYDROcodone-acetaminophen (Norco) 5-325 mg tablet (Start on 11/4/2024)    gabapentin (Neurontin) 300 mg capsule    cyclobenzaprine (Flexeril) 5 mg tablet    Long term (current) use of opiate analgesic    Relevant Medications    HYDROcodone-acetaminophen (Norco) 5-325 mg tablet (Start on 11/4/2024)    gabapentin (Neurontin) 300 mg capsule    cyclobenzaprine (Flexeril) 5 mg tablet     Other Visit Diagnoses       Long term current use of opiate analgesic    -  Primary    Relevant Orders    Opiate/Opioid/Benzo Prescription Compliance              Plan  Reviewed the pain generators.  Went over the types of pain with neuropathic and nociceptive and different pathologies and therapeutic modalities. Discussed the mechanism of action of interventions from acupuncture, physical therapy , regular exercises, injections, botox, spinal cord stimulation, and role of surgery     Went over pathology of the intervertebral disc displacement and the anatomical relation to the Nerve roots and relation to the radicular symptoms. Went over treatment modalities with conservative treatment including acupuncture   and epidural steroid injection with fluoroscopy guidance and last resort of surgery    Based on the above findings and the clinical response to the opioids medications and improvement of the activities of daily living, sleep, and work performance. We made this complex decision to continue the opioids therapy in light of the evidence of the patient's responsibility in using the pain medications as prescribed for the nonmalignant chronic pain condition. We discussed about the use of the pain medications to treat the symptoms of chronic nonmalignant pain and we are not trying the repair the permanent damage in the tissues, rather we are trying to control the symptoms induced by the permanent damage to the tissues inducing the chronic pain condition and resulting disability. I explained the difference and discussed it with the patient and stressed the importance of knowing the difference especially because of the potential side effects and the potential addicting effect and habit forming nature of the dangerous drugs we are using to treat the symptoms of the chronic pain.      We discussed that we are prescribing the medications on good manas and legitimate medical reason.     We reviewed the side effects and precautions of opioids prescriptions as discussed in the opioids treatment agreement.    realizes the interaction between the therapeutic classes including  the respiratory depression and potential death     Random drug testing   we will submit     Consider DAYSI Risks not limited to infection, sepsis, abscess, bleeding , nerve injury, paralysis, and death...   She has AAA but her symptoms are radicular  has a narcan at home know how and when to use it if needed.     Discussed about NSAIDS and I explained about the opioids sparing effect to allow keeping the opioids dose at minimal effective dose.   I went over the potential side effects of the NSAIDS on the gastrointestinal, renal and cardiovascular systems.      I detailed the side effects from the acetaminophen in the medication and made aware of those. I also explained about the cumulative effects on the organs and mainly the liver.     Given the opioids therapy , we discussed about the risk for accidental over dose on the pain medications, either for patient or other household. I went over the mechanism of action and mode of use of the Naloxone according to the  recommendations. I will provide a prescription for a kit.     Follow-up 8 weeks or earlier if needed     The level of clinical decision making in this office visit,  is high, given the high risks of complications with the morbidity and mortality due to the fact that acute and chronic pain may pose a threat to life and bodily function, if under treated, poorly treated, or with failure to maintain adequate treatment and timely medical follow up. Additionally over treatment has its own set of complications including overdosing on the pain medications and also the habit forming potentials with the use of the medications used to treat chronic painful conditions including therapeutic classes classified as dangerous medications. Given the serious and fluctuating nature of pain level and instensity with extensive consideration for whenever pain changes, there is always the risk of prolonged functional impairment requiring close patient monitoring with  regular assessments and reassessments and high level medical decision making at every office visit. The amount and complexity of data reviewed is high given the patient clinical presentation, labs,  data, radiology reports, and other tests as discussed during office visits. Pertinent data whether positive or negative were taken in consideration in the process of making this high level medical decision.

## 2024-10-14 ENCOUNTER — HOSPITAL ENCOUNTER (OUTPATIENT)
Dept: OPERATING ROOM | Facility: CLINIC | Age: 69
Setting detail: OUTPATIENT SURGERY
Discharge: HOME | End: 2024-10-14
Payer: MEDICARE

## 2024-10-14 VITALS
TEMPERATURE: 97 F | RESPIRATION RATE: 16 BRPM | DIASTOLIC BLOOD PRESSURE: 82 MMHG | HEART RATE: 74 BPM | SYSTOLIC BLOOD PRESSURE: 165 MMHG | OXYGEN SATURATION: 97 %

## 2024-10-14 DIAGNOSIS — M51.26 HERNIATED NUCLEUS PULPOSUS, L4-5: ICD-10-CM

## 2024-10-14 DIAGNOSIS — M54.16 RIGHT LUMBAR RADICULOPATHY: ICD-10-CM

## 2024-10-14 PROCEDURE — 7100000009 HC PHASE TWO TIME - INITIAL BASE CHARGE

## 2024-10-14 PROCEDURE — 3600000006 HC OR TIME - EACH INCREMENTAL 1 MINUTE - PROCEDURE LEVEL ONE

## 2024-10-14 PROCEDURE — 2500000004 HC RX 250 GENERAL PHARMACY W/ HCPCS (ALT 636 FOR OP/ED): Performed by: PAIN MEDICINE

## 2024-10-14 PROCEDURE — 3600000001 HC OR TIME - INITIAL BASE CHARGE - PROCEDURE LEVEL ONE

## 2024-10-14 PROCEDURE — 2550000001 HC RX 255 CONTRASTS: Performed by: PAIN MEDICINE

## 2024-10-14 PROCEDURE — 7100000010 HC PHASE TWO TIME - EACH INCREMENTAL 1 MINUTE

## 2024-10-14 PROCEDURE — 62323 NJX INTERLAMINAR LMBR/SAC: CPT | Performed by: PHYSICAL MEDICINE & REHABILITATION

## 2024-10-14 RX ORDER — TRIAMCINOLONE ACETONIDE 40 MG/ML
INJECTION, SUSPENSION INTRA-ARTICULAR; INTRAMUSCULAR AS NEEDED
Status: COMPLETED | OUTPATIENT
Start: 2024-10-14 | End: 2024-10-14

## 2024-10-14 RX ORDER — LIDOCAINE HYDROCHLORIDE 5 MG/ML
INJECTION, SOLUTION INFILTRATION; INTRAVENOUS AS NEEDED
Status: COMPLETED | OUTPATIENT
Start: 2024-10-14 | End: 2024-10-14

## 2024-10-14 ASSESSMENT — COLUMBIA-SUICIDE SEVERITY RATING SCALE - C-SSRS
2. HAVE YOU ACTUALLY HAD ANY THOUGHTS OF KILLING YOURSELF?: NO
6. HAVE YOU EVER DONE ANYTHING, STARTED TO DO ANYTHING, OR PREPARED TO DO ANYTHING TO END YOUR LIFE?: NO
1. IN THE PAST MONTH, HAVE YOU WISHED YOU WERE DEAD OR WISHED YOU COULD GO TO SLEEP AND NOT WAKE UP?: NO

## 2024-10-14 ASSESSMENT — PAIN - FUNCTIONAL ASSESSMENT
PAIN_FUNCTIONAL_ASSESSMENT: 0-10
PAIN_FUNCTIONAL_ASSESSMENT: 0-10

## 2024-10-14 ASSESSMENT — ENCOUNTER SYMPTOMS
OCCASIONAL FEELINGS OF UNSTEADINESS: 0
LOSS OF SENSATION IN FEET: 0
DEPRESSION: 0

## 2024-10-14 ASSESSMENT — PAIN SCALES - GENERAL
PAINLEVEL_OUTOF10: 3
PAINLEVEL_OUTOF10: 3

## 2024-10-14 NOTE — H&P
History Of Present Illness  Amanda De Jesus is a 69 y.o. female presenting with lumbar radic .     Past Medical History  Past Medical History:   Diagnosis Date    Heart disease, unspecified     Heart trouble    Other intervertebral disc displacement, lumbar region 09/28/2016    Herniated nucleus pulposus, L4-5    Personal history of other diseases of the circulatory system 02/01/2019    History of hypertension    Personal history of other diseases of the circulatory system     History of hypertension    Personal history of other diseases of the respiratory system     History of sinus problem    Personal history of other endocrine, nutritional and metabolic disease 01/25/2019    History of hyperlipidemia    Personal history of other endocrine, nutritional and metabolic disease     History of hypercholesterolemia    Personal history of other infectious and parasitic diseases 05/10/2019    History of candidiasis of mouth       Surgical History  Past Surgical History:   Procedure Laterality Date    CARPAL TUNNEL RELEASE  08/10/2015    Neuroplasty Median Nerve At Carpal Tunnel    CHOLECYSTECTOMY  09/26/2013    Cholecystectomy    CT ABDOMEN ANGIOGRAM W AND/OR WO IV CONTRAST  10/4/2022    CT ABDOMEN ANGIOGRAM W AND/OR WO IV CONTRAST 10/4/2022 [facility]CILL[facility]    CT ABDOMEN PELVIS ANGIOGRAM W AND/OR WO IV CONTRAST  11/2/2023    CT ABDOMEN PELVIS ANGIOGRAM W AND/OR WO IV CONTRAST 11/2/2023 [facility] CT    KNEE ARTHROSCOPY W/ DEBRIDEMENT  09/29/2016    Arthroscopy Knee Left    OTHER SURGICAL HISTORY  05/10/2019    Hip replacement    OTHER SURGICAL HISTORY  03/01/2019    Hip surgery    OTHER SURGICAL HISTORY  01/15/2019    Carpal tunnel surgery    OTHER SURGICAL HISTORY  01/15/2019    Knee replacement        Social History  She reports that she has been smoking cigarettes. She has never used smokeless tobacco. She reports that she does not drink alcohol and does not use drugs.    Family History  Family History   Problem  Relation Name Age of Onset    Breast cancer Mother      Other (Varicose veins) Mother      Heart disease Father      Kidney failure Father      Diabetes type II Father      Pancreatic cancer Other sibling         Allergies  Lansoprazole, Semaglutide, and Cefprozil    Review of Systems   No Current Cardio pulmonary symptoms  Denied any fever or chills. No weight loss and no night sweats. No cough or sputum production. No diarrhea   The constipation has been responding to fibers and over the counter medications.     No bladder and bowel incontinence and no other changes in bladder and bowel. No skin changes.  Reports tiredness and fatigability only if the pain is not controlled.   Denied opioids diversion and abuse and denies alcoholism. Denies overuse of the pain medications.   Physical Exam    Heart regular breathing regular    There is decreased sensory to light touch on the lateral aspects of the thighs and around the   knee going down to the lateral aspect of the legs to the   lateral malleoli into the dorsum of the feet..    Deep tendon reflexes is present for the patellar tendons bilaterally.  Achilles reflexes are present bilaterally and symmetric.    Medial Hamstrings reflex is decreased bilaterally  Plantar cutaneous are downgoing.  Ankle dorsiflexion is 5/5 bilaterally.  Plantar flexion of the ankles are 5/5 bilaterally.  Big toe extension is 4/5 bilaterally  Negative Tinel's sign over the right peroneal nerve at the fibular neck.  Mann sign for axial loading and global rotation are negative.  No aberrant pain behavior.   Last Recorded Vitals  See nursing notes     Relevant Results   Current Outpatient Medications   Medication Instructions    acetaminophen (TYLENOL) 325 mg, oral, Every 6 hours PRN    amLODIPine (NORVASC) 5 mg, oral, Daily before breakfast    ascorbic acid, vitamin C, 500 mg capsule oral, Take as directed<BR>    aspirin 81 mg EC tablet 1 tablet, oral, Daily    atorvastatin (Lipitor) 80 mg  tablet 1 tablet, oral, Daily    cholecalciferol (Vitamin D-3) 50 mcg (2,000 unit) capsule 4,125 capsules, oral, Daily    CRANBERRY ORAL 2 capsules, oral, Daily    cyanocobalamin (Vitamin B-12) 1,000 mcg tablet oral, Daily    cyclobenzaprine (FLEXERIL) 5 mg, oral, Nightly PRN    d-mannose 1,500 mg, oral, 2 times daily    ezetimibe (ZETIA) 10 mg, oral, Daily    famotidine-Ca carb-mag hydrox (Pepcid Complete) -165 mg chewable tablet 1 tablet, oral, Every 12 hours PRN    furosemide (LASIX) 40 mg, oral, Daily    gabapentin (NEURONTIN) 300 mg, oral, 2 times daily    HYDROcodone-acetaminophen (Norco) 5-325 mg tablet 1 tablet, oral, Every 12 hours PRN    [START ON 11/4/2024] HYDROcodone-acetaminophen (Norco) 5-325 mg tablet 1 tablet, oral, Every 12 hours PRN    losartan (COZAAR) 100 mg, oral, Daily before breakfast    metFORMIN XR (GLUCOPHAGE-XR) 500 mg, oral, Every evening    metoprolol succinate XL (TOPROL-XL) 100 mg, oral, Daily    multivitamin tablet 1 tablet, oral, Daily    Myrbetriq 50 mg, oral, Daily    naloxone (NARCAN) 4 mg, nasal, As needed, May repeat every 2-3 minutes if needed, alternating nostrils, until medical assistance becomes available.    potassium chloride CR 10 mEq ER tablet 10 mEq, oral, Daily    varenicline (CHANTIX) 0.5 mg, oral, 2 times daily, Take with full glass of water.    zinc gluconate 100 mg tablet oral, Daily, Zinc 100 MG Oral Tablet; TAKE AS DIRECTED.<BR><BR>         Assessment/Plan   Assessment & Plan  Herniated nucleus pulposus, L4-5    Right lumbar radiculopathy      Lumbar Elizabeth       I spent 10 minutes in the professional and overall care of this patient.      Kevin Javed MD

## 2024-10-14 NOTE — POST-PROCEDURE NOTE
Lumbar epidural steroid injection with fluoroscopy guidance     Time-in:  1025 am   Time-out: 1040 am     Indications:  Failure to respond to conservative treatment with therapy and medications.     Sedation: none   . Patient responsive to verbal commands. Monitoring of the vitals signs performed by qualified Registered Nurse during the entire procedure   Please see sedation record for sedation by RN    Level L5S1 midline      PROCEDURE:    The risks, benefits and alternatives of the procedure were discussed with the patient and agreed to proceed. The risks included but not limited to: infection, bleeding, paralysis, nerve injury sepsis and remotely death were discussed with the patient during the office and again in the pre procedure area.  The patient signed informed consent in the pre procedure area.     The patient was brought to procedure room and time out for the procedure was performed with the procedure room staff present. Patient placed in prone position on the procedure table and draped and covered appropriately.      Fluoroscopy machine was used to identify the L5S1 interspace at the midline.     Skin prepped and draped in sterile fashion over the lower lumbar spine area.  Skin was infiltrated with local anesthetic using 0.5% lidocaine.  Tuohy needle was introduced to the epidural space, that was identified with loss of resistance technique.  Then, adequate flow of contrast dye in the epidural space, was verified with fluoroscopy.  At that point, 40 mg of kenalog  mixed with 5 mL of normal saline were injected.      felt the tingling down the lower limb during the injection     Procedure tolerated very well.  No complications encountered.  Post procedure care discussed with the patient, agreed to proceed.   Patient instructed on keeping track of the pain level post discharge.   Patient discharged home, from the recovery room, in stable condition.

## 2024-10-24 ENCOUNTER — APPOINTMENT (OUTPATIENT)
Dept: PAIN MEDICINE | Facility: CLINIC | Age: 69
End: 2024-10-24
Payer: MEDICARE

## 2024-10-24 DIAGNOSIS — M51.27 OTHER INTERVERTEBRAL DISC DISPLACEMENT, LUMBOSACRAL REGION: ICD-10-CM

## 2024-10-24 DIAGNOSIS — M54.16 RIGHT LUMBAR RADICULOPATHY: ICD-10-CM

## 2024-10-24 DIAGNOSIS — M51.26 HERNIATED NUCLEUS PULPOSUS, L4-5: Primary | ICD-10-CM

## 2024-10-24 PROCEDURE — 3044F HG A1C LEVEL LT 7.0%: CPT | Performed by: PHYSICAL MEDICINE & REHABILITATION

## 2024-10-24 PROCEDURE — 3061F NEG MICROALBUMINURIA REV: CPT | Performed by: PHYSICAL MEDICINE & REHABILITATION

## 2024-10-24 PROCEDURE — G2211 COMPLEX E/M VISIT ADD ON: HCPCS | Performed by: PHYSICAL MEDICINE & REHABILITATION

## 2024-10-24 PROCEDURE — 3048F LDL-C <100 MG/DL: CPT | Performed by: PHYSICAL MEDICINE & REHABILITATION

## 2024-10-24 PROCEDURE — 99214 OFFICE O/P EST MOD 30 MIN: CPT | Performed by: PHYSICAL MEDICINE & REHABILITATION

## 2024-10-24 PROCEDURE — 4010F ACE/ARB THERAPY RXD/TAKEN: CPT | Performed by: PHYSICAL MEDICINE & REHABILITATION

## 2024-10-24 NOTE — PROGRESS NOTES
"Chief complaint  Back and lower limbs pain     History  Amanda De Jesus is back for pain management office visit  Had the DAYSI last week  Had initial aggravation  for one day and now pain is gone have relief from the DAYSI  The R side is better but still having some twinges of pain on the left side  Pain is otherwise controlled    Dw her about continue with HEP and consider PT she is going to [facility]      Pain level without medication is 4 to 5/10 , with the medication pain level 0/10. Since after the injection     The pain meds are helping control the pain and improving Activities of Daily living and quality of life and quality of sleep.    opioids treatment agreement feb 2024       Review of Systems :  Denied any fever or chills. No weight loss and no night sweats. No cough or sputum production. No diarrhea   The constipation has been responding to fibers and over the counter medications.     No bladder and bowel incontinence and no other changes in bladder and bowel. No skin changes.  Reports tiredness and fatigability only if the pain is not controlled.     Denied opioids diversion and abuse and denies alcoholism. Denies overuse of the pain medications.  No reported euphoria sensation or getting a \"high\" on the pain medications.         Physical examination  Awake, alert and oriented for time place and persons   declined Chaperone for the visit and was adequately  draped for the exam.    Slr negative   plantar cutaneous reflex are down going bilaterally       Diagnosis  Problem List Items Addressed This Visit       Herniated nucleus pulposus, L4-5 - Primary    Other intervertebral disc displacement, lumbosacral region    Right lumbar radiculopathy        Plan  Reviewed the pain generators.  Went over the types of pain with neuropathic and nociceptive and different pathologies and therapeutic modalities. Discussed the mechanism of action of interventions from acupuncture, physical therapy , regular " exercises, injections, botox, spinal cord stimulation, and role of surgery     Went over pathology of the intervertebral disc displacement and the anatomical relation to the Nerve roots and relation to the radicular symptoms. Went over treatment modalities with conservative treatment including acupuncture   and epidural steroid injection with fluoroscopy guidance and last resort of surgery      Hold off injection for now and consider the PT as above  We discussed that we are prescribing the medications on good manas and legitimate medical reason.     We reviewed the side effects and precautions of opioids prescriptions as discussed in the opioids treatment agreement.    realizes the interaction between the therapeutic classes including the respiratory depression and potential death     Random drug testing   we will submit      Consider repeat DAYSI if pain comes back and if chronic consider SCS    Discussed about NSAIDS and I explained about the opioids sparing effect to allow keeping the opioids dose at minimal effective dose.   I went over the potential side effects of the NSAIDS on the gastrointestinal, renal and cardiovascular systems.      I detailed the side effects from the acetaminophen in the medication and made aware of those. I also explained about the cumulative effects on the organs and mainly the liver.     Given the opioids therapy , we discussed about the risk for accidental over dose on the pain medications, either for patient or other household. I went over the mechanism of action and mode of use of the Naloxone according to the  recommendations. I will provide a prescription for a kit.     Follow-up 8 weeks or earlier if needed     The level of clinical decision making in this office visit,  is high, given the high risks of complications with the morbidity and mortality due to the fact that acute and chronic pain may pose a threat to life and bodily function, if under treated, poorly  treated, or with failure to maintain adequate treatment and timely medical follow up. Additionally over treatment has its own set of complications including overdosing on the pain medications and also the habit forming potentials with the use of the medications used to treat chronic painful conditions including therapeutic classes classified as dangerous medications. Given the serious and fluctuating nature of pain level and instensity with extensive consideration for whenever pain changes, there is always the risk of prolonged functional impairment requiring close patient monitoring with regular assessments and reassessments and high level medical decision making at every office visit. The amount and complexity of data reviewed is high given the patient clinical presentation, labs,  data, radiology reports, and other tests as discussed during office visits. Pertinent data whether positive or negative were taken in consideration in the process of making this high level medical decision.

## 2024-11-01 ENCOUNTER — HOSPITAL ENCOUNTER (OUTPATIENT)
Dept: RADIOLOGY | Facility: CLINIC | Age: 69
Discharge: HOME | End: 2024-11-01
Payer: MEDICARE

## 2024-11-01 DIAGNOSIS — I71.42 JUXTARENAL ABDOMINAL AORTIC ANEURYSM (AAA) WITHOUT RUPTURE (CMS-HCC): ICD-10-CM

## 2024-11-01 PROCEDURE — 74176 CT ABD & PELVIS W/O CONTRAST: CPT

## 2024-11-11 ENCOUNTER — OFFICE VISIT (OUTPATIENT)
Dept: VASCULAR SURGERY | Facility: HOSPITAL | Age: 69
End: 2024-11-11
Payer: MEDICARE

## 2024-11-11 VITALS
WEIGHT: 227 LBS | DIASTOLIC BLOOD PRESSURE: 87 MMHG | BODY MASS INDEX: 38.76 KG/M2 | OXYGEN SATURATION: 97 % | HEIGHT: 64 IN | SYSTOLIC BLOOD PRESSURE: 141 MMHG | HEART RATE: 82 BPM

## 2024-11-11 DIAGNOSIS — I71.41 PARARENAL ABDOMINAL AORTIC ANEURYSM (AAA) WITHOUT RUPTURE (CMS-HCC): Primary | ICD-10-CM

## 2024-11-11 PROCEDURE — 3079F DIAST BP 80-89 MM HG: CPT | Performed by: SURGERY

## 2024-11-11 PROCEDURE — 99214 OFFICE O/P EST MOD 30 MIN: CPT | Performed by: SURGERY

## 2024-11-11 PROCEDURE — 3061F NEG MICROALBUMINURIA REV: CPT | Performed by: SURGERY

## 2024-11-11 PROCEDURE — 3008F BODY MASS INDEX DOCD: CPT | Performed by: SURGERY

## 2024-11-11 PROCEDURE — 3077F SYST BP >= 140 MM HG: CPT | Performed by: SURGERY

## 2024-11-11 PROCEDURE — 3044F HG A1C LEVEL LT 7.0%: CPT | Performed by: SURGERY

## 2024-11-11 PROCEDURE — 3048F LDL-C <100 MG/DL: CPT | Performed by: SURGERY

## 2024-11-11 PROCEDURE — 1159F MED LIST DOCD IN RCRD: CPT | Performed by: SURGERY

## 2024-11-11 PROCEDURE — 4010F ACE/ARB THERAPY RXD/TAKEN: CPT | Performed by: SURGERY

## 2024-11-11 NOTE — PROGRESS NOTES
Vascular Surgery Consult/Clinic Note    CC:    HPI:  Amanda De Jesus is 69 y.o. female with history of CAD (hx of PCI for MI), HTN, HLD and single right kidney who referred for AAA. Patient reports she is trying to quit tobacco use. 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 She denies any abdominal pain. She has chronic back pain but denies any changes in severity or quality.     Meds:   Current Outpatient Medications on File Prior to Visit   Medication Sig Dispense Refill    acetaminophen (Tylenol) 325 mg tablet Take 1 tablet (325 mg) by mouth every 6 hours if needed.      amLODIPine (Norvasc) 5 mg tablet Take 1 tablet (5 mg) by mouth once daily in the morning. Take before meals.      ascorbic acid, vitamin C, 500 mg capsule Take by mouth. Take as directed      aspirin 81 mg EC tablet Take 1 tablet (81 mg) by mouth once daily.      atorvastatin (Lipitor) 80 mg tablet Take 1 tablet (80 mg) by mouth once daily.      cholecalciferol (Vitamin D-3) 50 mcg (2,000 unit) capsule Take 4,125 capsules (206,250 mcg) by mouth early in the morning..      CRANBERRY ORAL Take 2 capsules by mouth once daily.      cyanocobalamin (Vitamin B-12) 1,000 mcg tablet Take by mouth once daily.      d-mannose 500 mg capsule Take 3 capsules (1,500 mg) by mouth 2 times a day.      ezetimibe (Zetia) 10 mg tablet Take 1 tablet (10 mg) by mouth once daily. 90 tablet 3    famotidine-Ca carb-mag hydrox (Pepcid Complete) -165 mg chewable tablet Chew 1 tablet every 12 hours if needed.      furosemide (Lasix) 40 mg tablet Take 1 tablet (40 mg) by mouth once daily. 90 tablet 3    HYDROcodone-acetaminophen (Norco) 5-325 mg tablet Take 1 tablet by mouth every 12 hours if needed for severe pain (7 - 10) for up to 28 days. Do not fill before November 4, 2024. 56 tablet 0    losartan (Cozaar) 100 mg tablet Take 1 tablet (100 mg) by mouth once daily in the morning. Take before meals.      metFORMIN  mg 24 hr tablet Take 1 tablet (500 mg) by mouth  once daily in the evening.      metoprolol succinate XL (Toprol-XL) 100 mg 24 hr tablet Take 1 tablet (100 mg) by mouth once daily.      multivitamin tablet Take 1 tablet by mouth once daily.      naloxone (Narcan) 4 mg/0.1 mL nasal spray Administer 1 spray (4 mg) into affected nostril(s) if needed for opioid reversal for up to 2 doses. May repeat every 2-3 minutes if needed, alternating nostrils, until medical assistance becomes available. 2 each 0    potassium chloride CR 10 mEq ER tablet Take 1 tablet (10 mEq) by mouth once daily.      zinc gluconate 100 mg tablet Take by mouth once daily. Zinc 100 MG Oral Tablet; TAKE AS DIRECTED.      gabapentin (Neurontin) 300 mg capsule Take 1 capsule (300 mg) by mouth 2 times a day. 60 capsule 2    [] HYDROcodone-acetaminophen (Norco) 5-325 mg tablet Take 1 tablet by mouth every 12 hours if needed for severe pain (7 - 10) for up to 28 days. Do not fill before 2024. 56 tablet 0    mirabegron (Myrbetriq) 50 mg tablet extended release 24 hr 24 hr tablet Take 1 tablet (50 mg) by mouth once daily. 90 tablet 3    varenicline (Chantix) 0.5 mg tablet Take 1 tablet (0.5 mg) by mouth 2 times a day. Take with full glass of water. (Patient not taking: Reported on 2024)       No current facility-administered medications on file prior to visit.        Allergies:   Allergies   Allergen Reactions    Lansoprazole Anaphylaxis and Swelling    Semaglutide Nausea Only    Cefprozil Rash and Swelling       SH:    Social Drivers of Health     Tobacco Use: High Risk (10/14/2024)    Patient History     Smoking Tobacco Use: Every Day     Smokeless Tobacco Use: Never     Passive Exposure: Not on file   Alcohol Use: Not At Risk (10/25/2023)    Received from [facility] [employer], [facility] [employer]    AUDIT-C     Frequency of Alcohol Consumption: Never     Average Number of Drinks: Patient does not drink     Frequency of Binge Drinking: Never   Financial Resource Strain: Medium Risk  (10/25/2023)    Received from [facility]    Overall Financial Resource Strain (CARDIA)     Difficulty of Paying Living Expenses: Somewhat hard   Food Insecurity: No Food Insecurity (10/25/2023)    Received from [facility]    Hunger Vital Sign     Worried About Running Out of Food in the Last Year: Never true     Ran Out of Food in the Last Year: Never true   Transportation Needs: No Transportation Needs (10/25/2023)    Received from [facility]    PRAPARE - Transportation     Lack of Transportation (Medical): No     Lack of Transportation (Non-Medical): No   Physical Activity: Unknown (10/25/2023)    Received from [facility]    Exercise Vital Sign     Days of Exercise per Week: 1 day     Minutes of Exercise per Session: Patient declined   Stress: No Stress Concern Present (10/25/2023)    Received from [facility] [employer] [facility] [employer] [facility] - Occupational Stress Questionnaire     Feeling of Stress : Not at all   Social Connections: Socially Integrated (10/25/2023)    Received from [facility]    Social Connection and Isolation Panel [NHANES]     Frequency of Communication with Friends and Family: More than three times a week     Frequency of Social Gatherings with Friends and Family: Once a week     Attends Denominational Services: More than 4 times per year     Active Member of Clubs or Organizations: Yes     Attends Club or Organization Meetings: More than 4 times per year     Marital Status:    Intimate Partner Violence: Not At Risk (10/25/2023)    Received from [facility]    Humiliation, Afraid, Rape, and Kick questionnaire     Fear of Current or Ex-Partner: No     Emotionally Abused: No     Physically Abused: No     Sexually Abused: No   Depression: Not at risk (6/12/2024)    Received from [facility]    PHQ-2     Patient  Health Questionnaire-2 Score: 0   Housing Stability: Unknown (10/25/2023)    Received from [facility]    Housing Stability Vital Sign     Unable to Pay for Housing in the Last Year: No     Number of Places Lived in the Last Year: Not on file     Unstable Housing in the Last Year: No   Utilities: Not on file   Digital Equity: Not on file   Health Literacy: Not on file        FH:  Family History   Problem Relation Name Age of Onset    Breast cancer Mother      Other (Varicose veins) Mother      Heart disease Father      Kidney failure Father      Diabetes type II Father      Pancreatic cancer Other sibling         ROS:  Review of Systems     Objective:  Vitals:  Vitals:    11/11/24 1040   BP: 141/87   Pulse:    SpO2:         Exam:  Gen: in NAD  GI: Soft, ND/NT  Ext:  BLE with 5/5 motor str. No tissue loss.     Imaging:  CT abd/pelv non con (11/5/2024) independently reviewed.   Juxta-renal AAA approx. 49 mm when measured using SVS measuring guideline.   Chronic LRA occlusion    CTA abd/pelv (4/16/2024) independently reviewed.   Juxta-renal AAA approx. 47 mm x 46 mm when measured using SVS measuring guideline.   Chronic LRA occlusion     CTA abd/pelv (11/2/2023) independently reviewed.   Juxta-renal AAA approx. 47 mm x 46 mm when measured using SVS measuring guideline.     Assessment & Plan:  Amanda De Jesus is 69 y.o. female Juxta-renal AAA approx. 49 mm.    - Currently using tobacco with high risk for open surgery with solitary kidney. Will cont. Close monitoring for now with ASA, statin therapy and BP control.   - Discussed signs and sx. Of rupture. In such case, patient was instructed to present to the nearest ER. Patient verbalized her understanding.    - Return to clinic in 6 months with non-con CT abd/pelv.       Raciel Hernandez MD, PhD  Clinical   [facility] [employer]  Co-Director, Aortic Center  [facility]  [place]

## 2024-11-13 ENCOUNTER — HOSPITAL ENCOUNTER (OUTPATIENT)
Dept: RADIOLOGY | Facility: EXTERNAL LOCATION | Age: 69
Discharge: HOME | End: 2024-11-13

## 2024-11-13 DIAGNOSIS — I71.42 JUXTARENAL ABDOMINAL AORTIC ANEURYSM (AAA) WITHOUT RUPTURE (CMS-HCC): ICD-10-CM

## 2024-11-21 ENCOUNTER — LAB (OUTPATIENT)
Dept: LAB | Facility: LAB | Age: 69
End: 2024-11-21
Payer: MEDICARE

## 2024-11-21 ENCOUNTER — HOSPITAL ENCOUNTER (OUTPATIENT)
Dept: RADIOLOGY | Facility: CLINIC | Age: 69
Discharge: HOME | End: 2024-11-21
Payer: MEDICARE

## 2024-11-21 DIAGNOSIS — R04.2 HEMOPTYSIS: ICD-10-CM

## 2024-11-21 DIAGNOSIS — Z79.891 LONG TERM CURRENT USE OF OPIATE ANALGESIC: ICD-10-CM

## 2024-11-21 LAB
AMPHETAMINES UR QL SCN: NORMAL
BARBITURATES UR QL SCN: NORMAL
BZE UR QL SCN: NORMAL
CANNABINOIDS UR QL SCN: NORMAL
CREAT UR-MCNC: 34.5 MG/DL (ref 20–320)
PCP UR QL SCN: NORMAL

## 2024-11-21 PROCEDURE — 80368 SEDATIVE HYPNOTICS: CPT

## 2024-11-21 PROCEDURE — 80361 OPIATES 1 OR MORE: CPT

## 2024-11-21 PROCEDURE — 71250 CT THORAX DX C-: CPT

## 2024-11-21 PROCEDURE — 80307 DRUG TEST PRSMV CHEM ANLYZR: CPT

## 2024-11-21 PROCEDURE — 80358 DRUG SCREENING METHADONE: CPT

## 2024-11-21 PROCEDURE — 71250 CT THORAX DX C-: CPT | Performed by: RADIOLOGY

## 2024-11-21 PROCEDURE — 80373 DRUG SCREENING TRAMADOL: CPT

## 2024-11-21 PROCEDURE — 80365 DRUG SCREENING OXYCODONE: CPT

## 2024-11-21 PROCEDURE — 80354 DRUG SCREENING FENTANYL: CPT

## 2024-11-21 PROCEDURE — 80346 BENZODIAZEPINES1-12: CPT

## 2024-11-21 PROCEDURE — 82570 ASSAY OF URINE CREATININE: CPT

## 2024-11-22 ENCOUNTER — TELEPHONE (OUTPATIENT)
Dept: UROLOGY | Facility: CLINIC | Age: 69
End: 2024-11-22
Payer: MEDICARE

## 2024-11-22 NOTE — TELEPHONE ENCOUNTER
Pt left message stating she saw her PCP and she had protein in her urine and the PCP suggested she fu with either us or Dr. Pratt, nephrologist. I'm assuming she would need to fu with Dr. Pratt for excess protein in urine but I wanted to double check with you first. Please let me know and I will give her Dr. Pratt's info as she asked if we knew his number, thanks.    Erlin Pratt MD  [address] [place] [address] [place] [id number] [phone]

## 2024-11-25 LAB
1OH-MIDAZOLAM UR CFM-MCNC: <25 NG/ML
6MAM UR CFM-MCNC: <25 NG/ML
7AMINOCLONAZEPAM UR CFM-MCNC: <25 NG/ML
A-OH ALPRAZ UR CFM-MCNC: <25 NG/ML
ALPRAZ UR CFM-MCNC: <25 NG/ML
CHLORDIAZEP UR CFM-MCNC: <25 NG/ML
CLONAZEPAM UR CFM-MCNC: <25 NG/ML
CODEINE UR CFM-MCNC: <50 NG/ML
DIAZEPAM UR CFM-MCNC: <25 NG/ML
EDDP UR CFM-MCNC: <25 NG/ML
FENTANYL UR CFM-MCNC: <2.5 NG/ML
HYDROCODONE CTO UR CFM-MCNC: 482 NG/ML
HYDROMORPHONE UR CFM-MCNC: <25 NG/ML
LORAZEPAM UR CFM-MCNC: <25 NG/ML
METHADONE UR CFM-MCNC: <25 NG/ML
MIDAZOLAM UR CFM-MCNC: <25 NG/ML
MORPHINE UR CFM-MCNC: <50 NG/ML
NORDIAZEPAM UR CFM-MCNC: <25 NG/ML
NORFENTANYL UR CFM-MCNC: <2.5 NG/ML
NORHYDROCODONE UR CFM-MCNC: 224 NG/ML
NOROXYCODONE UR CFM-MCNC: <25 NG/ML
NORTRAMADOL UR-MCNC: <50 NG/ML
OXAZEPAM UR CFM-MCNC: <25 NG/ML
OXYCODONE UR CFM-MCNC: <25 NG/ML
OXYMORPHONE UR CFM-MCNC: <25 NG/ML
TEMAZEPAM UR CFM-MCNC: <25 NG/ML
TRAMADOL UR CFM-MCNC: <50 NG/ML
ZOLPIDEM UR CFM-MCNC: <25 NG/ML
ZOLPIDEM UR-MCNC: <25 NG/ML

## 2024-11-26 ENCOUNTER — APPOINTMENT (OUTPATIENT)
Dept: PAIN MEDICINE | Facility: CLINIC | Age: 69
End: 2024-11-26
Payer: MEDICARE

## 2024-11-26 DIAGNOSIS — Z79.891 LONG TERM (CURRENT) USE OF OPIATE ANALGESIC: ICD-10-CM

## 2024-11-26 DIAGNOSIS — M51.26 HERNIATED NUCLEUS PULPOSUS, L4-5: ICD-10-CM

## 2024-11-26 DIAGNOSIS — M54.16 RIGHT LUMBAR RADICULOPATHY: ICD-10-CM

## 2024-11-26 DIAGNOSIS — M51.369 DEGENERATIVE DISC DISEASE, LUMBAR: ICD-10-CM

## 2024-11-26 DIAGNOSIS — G89.29 CHRONIC RADICULAR LUMBAR PAIN: ICD-10-CM

## 2024-11-26 DIAGNOSIS — M51.27 OTHER INTERVERTEBRAL DISC DISPLACEMENT, LUMBOSACRAL REGION: ICD-10-CM

## 2024-11-26 DIAGNOSIS — M54.16 CHRONIC RADICULAR LUMBAR PAIN: ICD-10-CM

## 2024-11-26 PROCEDURE — 3044F HG A1C LEVEL LT 7.0%: CPT | Performed by: STUDENT IN AN ORGANIZED HEALTH CARE EDUCATION/TRAINING PROGRAM

## 2024-11-26 PROCEDURE — 3048F LDL-C <100 MG/DL: CPT | Performed by: STUDENT IN AN ORGANIZED HEALTH CARE EDUCATION/TRAINING PROGRAM

## 2024-11-26 PROCEDURE — 99202 OFFICE O/P NEW SF 15 MIN: CPT | Performed by: STUDENT IN AN ORGANIZED HEALTH CARE EDUCATION/TRAINING PROGRAM

## 2024-11-26 PROCEDURE — 4010F ACE/ARB THERAPY RXD/TAKEN: CPT | Performed by: STUDENT IN AN ORGANIZED HEALTH CARE EDUCATION/TRAINING PROGRAM

## 2024-11-26 PROCEDURE — 3060F POS MICROALBUMINURIA REV: CPT | Performed by: STUDENT IN AN ORGANIZED HEALTH CARE EDUCATION/TRAINING PROGRAM

## 2024-11-26 RX ORDER — HYDROCODONE BITARTRATE AND ACETAMINOPHEN 5; 325 MG/1; MG/1
1 TABLET ORAL EVERY 12 HOURS PRN
Qty: 56 TABLET | Refills: 0 | Status: SHIPPED | OUTPATIENT
Start: 2024-12-04 | End: 2025-01-01

## 2024-11-26 RX ORDER — HYDROCODONE BITARTRATE AND ACETAMINOPHEN 5; 325 MG/1; MG/1
1 TABLET ORAL EVERY 12 HOURS PRN
Qty: 56 TABLET | Refills: 0 | Status: SHIPPED | OUTPATIENT
Start: 2025-01-01 | End: 2025-01-29

## 2024-11-26 NOTE — PROGRESS NOTES
Subjective   Patient ID: Amanda De Jesus is a 69 y.o. female who presents for No chief complaint on file..  HPI    Amanda De Jesus is back for pain management virtual visit, she has pneumonia and is unable to make it to the office. She is a patient of Dr Javed's  Had the DAYSI recently which wsa some help.  Had initial increase in pain for one day and now pain is gone have relief from the DAYSI  The R side is better but still having some twinges of pain on the left side- similar to before  Pain is otherwise somewhat controlled with the medication she is on.     Dw her about continue with HEP and consider PT she is going to [facility]       Pain level without medication is 5/10 , with the medication pain level 0/10. Since after the injection      The pain meds are helping control the pain and improving Activities of Daily living and quality of life and quality of sleep.     opioids treatment agreement feb 2024    OARRS:  Rizwan Umaña DO on 11/26/2024 10:05 AM  I have personally reviewed the OARRS report for Amanda De Jesus. I have considered the risks of abuse, dependence, addiction and diversion    Is the patient prescribed a combination of a benzodiazepine and opioid?  No    Last Urine Drug Screen / ordered today: Yes  Recent Results (from the past 8760 hours)   Confirmation Opiate/Opioid/Benzo Prescription Compliance    Collection Time: 11/21/24 10:39 AM   Result Value Ref Range    Clonazepam <25 <25 ng/mL    7-Aminoclonazepam <25 <25 ng/mL    Alprazolam <25 <25 ng/mL    Alpha-Hydroxyalprazolam <25 <25 ng/mL    Midazolam <25 <25 ng/mL    Alpha-Hydroxymidazolam <25 <25 ng/mL    Chlordiazepoxide <25 <25 ng/mL    Diazepam <25 <25 ng/mL    Nordiazepam <25 <25 ng/mL    Temazepam <25 <25 ng/mL    Oxazepam <25 <25 ng/mL    Lorazepam <25 <25 ng/mL    Methadone <25 <25 ng/mL    EDDP <25 <25 ng/mL    6-Acetylmorphine <25 <25 ng/mL    Codeine <50 <50 ng/mL    Hydrocodone 482 (H) <25 ng/mL    Hydromorphone <25 <25 ng/mL     Morphine  <50 <50 ng/mL    Norhydrocodone 224 (H) <25 ng/mL    Noroxycodone <25 <25 ng/mL    Oxycodone <25 <25 ng/mL    Oxymorphone <25 <25 ng/mL    Fentanyl <2.5 <2.5 ng/mL    Norfentanyl <2.5 <2.5 ng/mL    Tramadol <50 <50 ng/mL    O-Desmethyltramadol <50 <50 ng/mL    Zolpidem <25 <25 ng/mL    Zolpidem Metabolite (ZCA) <25 <25 ng/mL   Screen Opiate/Opioid/Benzo Prescription Compliance    Collection Time: 11/21/24 10:39 AM   Result Value Ref Range    Creatinine, Urine Random 34.5 20.0 - 320.0 mg/dL    Amphetamine Screen, Urine Presumptive Negative Presumptive Negative    Barbiturate Screen, Urine Presumptive Negative Presumptive Negative    Cannabinoid Screen, Urine Presumptive Negative Presumptive Negative    Cocaine Metabolite Screen, Urine Presumptive Negative Presumptive Negative    PCP Screen, Urine Presumptive Negative Presumptive Negative     Results are as expected.         Review of Systems     Current Outpatient Medications   Medication Instructions    acetaminophen (TYLENOL) 325 mg, Every 6 hours PRN    amLODIPine (NORVASC) 5 mg, Daily before breakfast    ascorbic acid, vitamin C, 500 mg capsule Take by mouth. Take as directed    aspirin 81 mg EC tablet 1 tablet, Daily    atorvastatin (Lipitor) 80 mg tablet 1 tablet, Daily    cholecalciferol (Vitamin D-3) 50 mcg (2,000 unit) capsule 4,125 capsules, Daily    CRANBERRY ORAL 2 capsules, Daily    cyanocobalamin (Vitamin B-12) 1,000 mcg tablet Daily    d-mannose 1,500 mg, 2 times daily    ezetimibe (ZETIA) 10 mg, oral, Daily    famotidine-Ca carb-mag hydrox (Pepcid Complete) -165 mg chewable tablet 1 tablet, Every 12 hours PRN    furosemide (LASIX) 40 mg, oral, Daily    gabapentin (NEURONTIN) 300 mg, oral, 2 times daily    [START ON 12/4/2024] HYDROcodone-acetaminophen (Norco) 5-325 mg tablet 1 tablet, oral, Every 12 hours PRN    [START ON 1/1/2025] HYDROcodone-acetaminophen (Norco) 5-325 mg tablet 1 tablet, oral, Every 12 hours PRN    losartan (COZAAR)  100 mg, Daily before breakfast    metFORMIN XR (GLUCOPHAGE-XR) 500 mg, Every evening    metoprolol succinate XL (TOPROL-XL) 100 mg, Daily    multivitamin tablet 1 tablet, Daily    Myrbetriq 50 mg, oral, Daily    naloxone (NARCAN) 4 mg, nasal, As needed, May repeat every 2-3 minutes if needed, alternating nostrils, until medical assistance becomes available.    potassium chloride CR 10 mEq ER tablet 10 mEq, Daily    varenicline (CHANTIX) 0.5 mg, 2 times daily    zinc gluconate 100 mg tablet Daily        Past Medical History:   Diagnosis Date    Heart disease, unspecified     Heart trouble    Other intervertebral disc displacement, lumbar region 09/28/2016    Herniated nucleus pulposus, L4-5    Personal history of other diseases of the circulatory system 02/01/2019    History of hypertension    Personal history of other diseases of the circulatory system     History of hypertension    Personal history of other diseases of the respiratory system     History of sinus problem    Personal history of other endocrine, nutritional and metabolic disease 01/25/2019    History of hyperlipidemia    Personal history of other endocrine, nutritional and metabolic disease     History of hypercholesterolemia    Personal history of other infectious and parasitic diseases 05/10/2019    History of candidiasis of mouth        Past Surgical History:   Procedure Laterality Date    CARPAL TUNNEL RELEASE  08/10/2015    Neuroplasty Median Nerve At Carpal Tunnel    CHOLECYSTECTOMY  09/26/2013    Cholecystectomy    CT ABDOMEN ANGIOGRAM W AND/OR WO IV CONTRAST  10/04/2022    CT ABDOMEN ANGIOGRAM W AND/OR WO IV CONTRAST 10/4/2022 [facility]CILL[facility]GAC[facility]    CT ABDOMEN PELVIS ANGIOGRAM W AND/OR WO IV CONTRAST  11/02/2023    CT ABDOMEN PELVIS ANGIOGRAM W AND/OR WO IV CONTRAST 11/2/2023 [facility] [id number] CT    JOINT REPLACEMENT Left     KNEE    KNEE ARTHROSCOPY W/ DEBRIDEMENT  09/29/2016    Arthroscopy Knee Left    OTHER SURGICAL HISTORY  05/10/2019    Hip  replacement    OTHER SURGICAL HISTORY  03/01/2019    Hip surgery    OTHER SURGICAL HISTORY  01/15/2019    Carpal tunnel surgery    OTHER SURGICAL HISTORY  01/15/2019    Knee replacement        Family History   Problem Relation Name Age of Onset    Breast cancer Mother      Other (Varicose veins) Mother      Heart disease Father      Kidney failure Father      Diabetes type II Father      Pancreatic cancer Other sibling         Allergies   Allergen Reactions    Lansoprazole Anaphylaxis and Swelling    Semaglutide Nausea Only    Cefprozil Rash and Swelling        No results found for this or any previous visit from the past 1000 days.      Objective     There were no vitals filed for this visit.     Physical Exam      Assessment/Plan   Problem List Items Addressed This Visit             ICD-10-CM       Mental Health    Long term (current) use of opiate analgesic Z79.891    Relevant Medications    HYDROcodone-acetaminophen (Norco) 5-325 mg tablet (Start on 12/4/2024)    HYDROcodone-acetaminophen (Norco) 5-325 mg tablet (Start on 1/1/2025)       Neuro    Degenerative disc disease, lumbar M51.369    Relevant Medications    HYDROcodone-acetaminophen (Norco) 5-325 mg tablet (Start on 12/4/2024)    HYDROcodone-acetaminophen (Norco) 5-325 mg tablet (Start on 1/1/2025)    Herniated nucleus pulposus, L4-5 M51.26    Relevant Medications    HYDROcodone-acetaminophen (Norco) 5-325 mg tablet (Start on 12/4/2024)    HYDROcodone-acetaminophen (Norco) 5-325 mg tablet (Start on 1/1/2025)    Other intervertebral disc displacement, lumbosacral region M51.27    Relevant Medications    HYDROcodone-acetaminophen (Norco) 5-325 mg tablet (Start on 12/4/2024)    HYDROcodone-acetaminophen (Norco) 5-325 mg tablet (Start on 1/1/2025)    Right lumbar radiculopathy M54.16    Relevant Medications    HYDROcodone-acetaminophen (Norco) 5-325 mg tablet (Start on 12/4/2024)    HYDROcodone-acetaminophen (Norco) 5-325 mg tablet (Start on 1/1/2025)     Chronic radicular lumbar pain M54.16, G89.29    Relevant Medications    HYDROcodone-acetaminophen (Norco) 5-325 mg tablet (Start on 12/4/2024)    HYDROcodone-acetaminophen (Norco) 5-325 mg tablet (Start on 1/1/2025)     The patient is under my care temporarily while the primary pain management provider, Dr. Kevin Javed, is unavailable on leave of absence.  I am reviewing continuing the patient's current treatment plan, including the prescribed medications, to avoid abrupt discontinuation, which could lead to adverse outcomes.    Risk assessment:    -The patient has been informed of the risks associate with combination of these medications, including respiratory depression, sedation, eventual dependency or abuse  -The patient has been educated on safe medication use, including avoiding alcohol and not driving or operating heavy machinery while on these medications.  -Urine drug screening and/or PDMP (prescription drug monitoring program) reviewed performed to assess compliance and monitor for concerning patterns.    Plan:    -Current medications are to be continued for now, pending further assessment.  The patient was informed that if still under my care at follow-up visits the change in medication management may occur, including tapering or adjusting treatment as clinically appropriate and in line with evidence-based guidelines.  -This treatment plan is being provided under careful consideration to balance the need for pain management while minimizing risk.  Documentation will be forwarded to Dr. Javed upon the return for further review   - patient advised to increase gabapentin dose. Advised to wean off of opioids    This note was generated with the aid of dictation software, there may be typos despite my attempts at proofreading.

## 2024-12-04 ENCOUNTER — TELEPHONE (OUTPATIENT)
Dept: CARDIOLOGY | Facility: CLINIC | Age: 69
End: 2024-12-04
Payer: MEDICARE

## 2024-12-05 ENCOUNTER — TELEPHONE (OUTPATIENT)
Dept: CARDIOLOGY | Facility: CLINIC | Age: 69
End: 2024-12-05
Payer: MEDICARE

## 2024-12-06 DIAGNOSIS — I10 PRIMARY HYPERTENSION: Primary | ICD-10-CM

## 2024-12-06 RX ORDER — SPIRONOLACTONE 25 MG/1
25 TABLET ORAL DAILY
Qty: 30 TABLET | Refills: 11 | Status: SHIPPED | OUTPATIENT
Start: 2024-12-06 | End: 2025-12-06

## 2024-12-06 NOTE — TELEPHONE ENCOUNTER
I spoke to patient. Her PCP increased amlodipine to 10 mg, though this caused a lot of swelling. She went back to 5 mg, and is still struggling with swelling as well as high BP. I will have her start spironolactone 25 mg once daily. I will have her obtain a BMP and magnesium level in 2 weeks. She knows to call for any concerns.

## 2024-12-10 PROCEDURE — RXMED WILLOW AMBULATORY MEDICATION CHARGE

## 2024-12-12 ENCOUNTER — PHARMACY VISIT (OUTPATIENT)
Dept: PHARMACY | Facility: CLINIC | Age: 69
End: 2024-12-12
Payer: COMMERCIAL

## 2024-12-31 ENCOUNTER — LAB (OUTPATIENT)
Dept: LAB | Facility: LAB | Age: 69
End: 2024-12-31
Payer: MEDICARE

## 2024-12-31 DIAGNOSIS — R30.0 DYSURIA: ICD-10-CM

## 2024-12-31 DIAGNOSIS — E87.5 HYPERKALEMIA: Primary | ICD-10-CM

## 2024-12-31 LAB
ANION GAP SERPL CALC-SCNC: 16 MMOL/L (ref 10–20)
APPEARANCE UR: CLEAR
BILIRUB UR STRIP.AUTO-MCNC: NEGATIVE MG/DL
BUN SERPL-MCNC: 40 MG/DL (ref 6–23)
CALCIUM SERPL-MCNC: 9.7 MG/DL (ref 8.6–10.6)
CHLORIDE SERPL-SCNC: 107 MMOL/L (ref 98–107)
CO2 SERPL-SCNC: 23 MMOL/L (ref 21–32)
COLOR UR: YELLOW
CREAT SERPL-MCNC: 1.85 MG/DL (ref 0.5–1.05)
EGFRCR SERPLBLD CKD-EPI 2021: 29 ML/MIN/1.73M*2
GLUCOSE SERPL-MCNC: 115 MG/DL (ref 74–99)
GLUCOSE UR STRIP.AUTO-MCNC: NORMAL MG/DL
KETONES UR STRIP.AUTO-MCNC: NEGATIVE MG/DL
LEUKOCYTE ESTERASE UR QL STRIP.AUTO: NEGATIVE
NITRITE UR QL STRIP.AUTO: NEGATIVE
PH UR STRIP.AUTO: 5.5 [PH]
POTASSIUM SERPL-SCNC: 5.6 MMOL/L (ref 3.5–5.3)
PROT UR STRIP.AUTO-MCNC: NEGATIVE MG/DL
RBC # UR STRIP.AUTO: NEGATIVE /UL
SODIUM SERPL-SCNC: 140 MMOL/L (ref 136–145)
SP GR UR STRIP.AUTO: 1.01
UROBILINOGEN UR STRIP.AUTO-MCNC: NORMAL MG/DL

## 2024-12-31 PROCEDURE — 81003 URINALYSIS AUTO W/O SCOPE: CPT

## 2024-12-31 PROCEDURE — 87086 URINE CULTURE/COLONY COUNT: CPT

## 2024-12-31 PROCEDURE — 80048 BASIC METABOLIC PNL TOTAL CA: CPT

## 2025-01-01 LAB — BACTERIA UR CULT: NORMAL

## 2025-01-20 ENCOUNTER — APPOINTMENT (OUTPATIENT)
Dept: PULMONOLOGY | Facility: CLINIC | Age: 70
End: 2025-01-20
Payer: MEDICARE

## 2025-01-20 PROBLEM — N18.2 STAGE 2 CHRONIC KIDNEY DISEASE: Status: ACTIVE | Noted: 2025-01-20

## 2025-01-20 PROBLEM — F41.9 ANXIETY: Status: RESOLVED | Noted: 2023-10-16 | Resolved: 2025-01-20

## 2025-01-20 PROBLEM — I71.40 ABDOMINAL AORTIC ANEURYSM (AAA) WITHOUT RUPTURE (CMS-HCC): Status: ACTIVE | Noted: 2023-10-10

## 2025-01-20 PROBLEM — M65.30 TRIGGER FINGER: Status: RESOLVED | Noted: 2023-10-16 | Resolved: 2025-01-20

## 2025-01-20 PROBLEM — M65.311 TRIGGER FINGER OF RIGHT THUMB: Status: RESOLVED | Noted: 2023-10-16 | Resolved: 2025-01-20

## 2025-01-20 PROBLEM — Z86.79 HISTORY OF HYPERTENSION: Status: ACTIVE | Noted: 2025-01-20

## 2025-01-20 PROBLEM — Z86.39 HISTORY OF HYPERCHOLESTEROLEMIA: Status: ACTIVE | Noted: 2025-01-20

## 2025-01-20 PROBLEM — Z95.5 H/O HEART ARTERY STENT: Status: ACTIVE | Noted: 2023-10-10

## 2025-01-20 PROBLEM — F17.200 CURRENT SMOKER: Status: ACTIVE | Noted: 2023-10-10

## 2025-01-22 ENCOUNTER — APPOINTMENT (OUTPATIENT)
Dept: CARDIOLOGY | Facility: CLINIC | Age: 70
End: 2025-01-22
Payer: MEDICARE

## 2025-01-24 DIAGNOSIS — G89.29 CHRONIC RADICULAR LUMBAR PAIN: ICD-10-CM

## 2025-01-24 DIAGNOSIS — M51.369 DEGENERATIVE DISC DISEASE, LUMBAR: ICD-10-CM

## 2025-01-24 DIAGNOSIS — Z79.891 LONG TERM (CURRENT) USE OF OPIATE ANALGESIC: ICD-10-CM

## 2025-01-24 DIAGNOSIS — M51.26 HERNIATED NUCLEUS PULPOSUS, L4-5: ICD-10-CM

## 2025-01-24 DIAGNOSIS — M54.16 CHRONIC RADICULAR LUMBAR PAIN: ICD-10-CM

## 2025-01-24 DIAGNOSIS — M54.16 RIGHT LUMBAR RADICULOPATHY: ICD-10-CM

## 2025-01-24 DIAGNOSIS — M51.27 OTHER INTERVERTEBRAL DISC DISPLACEMENT, LUMBOSACRAL REGION: ICD-10-CM

## 2025-01-24 RX ORDER — GABAPENTIN 300 MG/1
300 CAPSULE ORAL 2 TIMES DAILY
Qty: 60 CAPSULE | Refills: 0 | Status: SHIPPED | OUTPATIENT
Start: 2025-01-24

## 2025-01-28 ENCOUNTER — APPOINTMENT (OUTPATIENT)
Dept: PAIN MEDICINE | Facility: CLINIC | Age: 70
End: 2025-01-28
Payer: MEDICARE

## 2025-01-28 DIAGNOSIS — M54.16 RIGHT LUMBAR RADICULOPATHY: ICD-10-CM

## 2025-01-28 DIAGNOSIS — M51.369 DEGENERATIVE DISC DISEASE, LUMBAR: ICD-10-CM

## 2025-01-28 DIAGNOSIS — Z79.891 LONG TERM (CURRENT) USE OF OPIATE ANALGESIC: ICD-10-CM

## 2025-01-28 DIAGNOSIS — M51.27 OTHER INTERVERTEBRAL DISC DISPLACEMENT, LUMBOSACRAL REGION: ICD-10-CM

## 2025-01-28 DIAGNOSIS — M54.16 CHRONIC RADICULAR LUMBAR PAIN: ICD-10-CM

## 2025-01-28 DIAGNOSIS — G89.29 CHRONIC RADICULAR LUMBAR PAIN: ICD-10-CM

## 2025-01-28 DIAGNOSIS — M51.26 HERNIATED NUCLEUS PULPOSUS, L4-5: ICD-10-CM

## 2025-01-28 PROCEDURE — 1160F RVW MEDS BY RX/DR IN RCRD: CPT | Performed by: PHYSICAL MEDICINE & REHABILITATION

## 2025-01-28 PROCEDURE — 1159F MED LIST DOCD IN RCRD: CPT | Performed by: PHYSICAL MEDICINE & REHABILITATION

## 2025-01-28 PROCEDURE — 4010F ACE/ARB THERAPY RXD/TAKEN: CPT | Performed by: PHYSICAL MEDICINE & REHABILITATION

## 2025-01-28 PROCEDURE — 99214 OFFICE O/P EST MOD 30 MIN: CPT | Performed by: PHYSICAL MEDICINE & REHABILITATION

## 2025-01-28 PROCEDURE — G2211 COMPLEX E/M VISIT ADD ON: HCPCS | Performed by: PHYSICAL MEDICINE & REHABILITATION

## 2025-01-28 RX ORDER — GABAPENTIN 300 MG/1
300 CAPSULE ORAL 2 TIMES DAILY
Qty: 60 CAPSULE | Refills: 0 | Status: SHIPPED | OUTPATIENT
Start: 2025-01-28

## 2025-01-28 RX ORDER — HYDROCODONE BITARTRATE AND ACETAMINOPHEN 5; 325 MG/1; MG/1
1 TABLET ORAL EVERY 12 HOURS PRN
Qty: 56 TABLET | Refills: 0 | Status: SHIPPED | OUTPATIENT
Start: 2025-02-03 | End: 2025-03-03

## 2025-01-28 RX ORDER — HYDROCODONE BITARTRATE AND ACETAMINOPHEN 5; 325 MG/1; MG/1
1 TABLET ORAL EVERY 12 HOURS PRN
Qty: 56 TABLET | Refills: 0 | Status: SHIPPED | OUTPATIENT
Start: 2025-03-03 | End: 2025-03-31

## 2025-01-28 NOTE — PROGRESS NOTES
Chief complaint  Back and lower limbs pain     History  Amanda De Jesus is back for pain management office visit  Amanda De Jesus was at home.  Could not physically come to the office today she has cough and fever .  I was at the office.  I used secure audiovisual communication provided by the Epic system. Amanda De Jesus  agreed on this form of communication and consented to the visit via A/V method.  The patient also understood that this will be billed as office visit.     Stressed about son in law not doing well and in hospital.  She has been in and out to visit with her daughter and help with the kids while her daughter visit her .  Just taking a toll on her back.  She continues to have the pain in the back down the lower limb.  In the past we gave her injection for the aggravation and those helped.  Currently maintained on the pain medication she is at hydrocodone twice a day her MEDD is 10.  She is trying to come off the pain medication and using those as needed.    In general the pain in the back is deep achy stabbing worse with movement it does radiate to the lateral and posterior aspect of the lower limbs.  The back pain is deep aching and stabbing the pain in the lower limbs is more of burning.    As we have discussed with her she is trying to cut back on the medication.  She had another physician advising her to cut the hydrocodone and decrease gabapentin however her medications are stable and she have the renal failure and she is not able to increase gabapentin anymore.  She is not on hemodialysis but we do not want to overload her kidneys    Pain level without medication is 6 or 7/10 , with the medication pain level 2 or 3/10.        The pain meds are helping control the pain and improving Activities of Daily living and quality of life and quality of sleep.    opioids treatment agreement today.  I went over the agreement with her over the the visit and I sent her additional copy to sign    Pill count  "today, she has enough to fill until February 7 which is Monday and she is running on schedule we will count the pill at the next office visit  Oarrs pulled and reviewed, no concerns  last urine toxicology testing was compliant this was done on : November 2024  Xray updated spine  ORT Score is 0  Pain pathology and pain generators spine  Modalities tried injection, surgery, physical therapy, TENS unit, nonsteroidal anti-inflammatory medication multiple epidural steroid injection    Review of Systems :  Denied any fever or chills. No weight loss and no night sweats. No cough or sputum production. No diarrhea   The constipation has been responding to fibers and over the counter medications.     No bladder and bowel incontinence and no other changes in bladder and bowel. No skin changes.  Reports tiredness and fatigability only if the pain is not controlled.     Denied opioids diversion and abuse and denies alcoholism. Denies overuse of the pain medications.  No reported euphoria sensation or getting a \"high\" on the pain medications.    The control of the pain with the pain medications is helping the control of the symptoms and allowing the function and activities of daily living, enjoyment of life, improving the quality of life and sleep with less interruption by the pain. The goal is symptomatic control of the nonmalignant chronic pain and not to repair the permanent damage in the tissues inducing the chronic pain conditions. We are aiming to shift the focus from the nonmalignant chronic pain to other aspects of life by symptomatically treating this chronic pain. If this pain is not treated it will lead to major morbidity and it is also associated with increased risks of mortality. The patient understands those very clearly and also understand high risks of morbidity and mortality if not strictly adherent to the treatment recommendations and reporting any associated side effects. Also patient understand the full " responsibility associated with these medications to avoid abuse or overuse or any use of these medications for anything besides treating the patient's own chronic pain and nothing else under any circumstances.        Physical examination  Awake, alert and oriented for time place and persons   This is a secure A/V visit  Diagnosis  Problem List Items Addressed This Visit       Degenerative disc disease, lumbar    Relevant Medications    HYDROcodone-acetaminophen (Norco) 5-325 mg tablet (Start on 2/3/2025)    HYDROcodone-acetaminophen (Norco) 5-325 mg tablet (Start on 3/3/2025)    gabapentin (Neurontin) 300 mg capsule    Herniated nucleus pulposus, L4-5    Relevant Medications    HYDROcodone-acetaminophen (Norco) 5-325 mg tablet (Start on 2/3/2025)    HYDROcodone-acetaminophen (Norco) 5-325 mg tablet (Start on 3/3/2025)    gabapentin (Neurontin) 300 mg capsule    Other intervertebral disc displacement, lumbosacral region    Relevant Medications    HYDROcodone-acetaminophen (Norco) 5-325 mg tablet (Start on 2/3/2025)    HYDROcodone-acetaminophen (Norco) 5-325 mg tablet (Start on 3/3/2025)    gabapentin (Neurontin) 300 mg capsule    Right lumbar radiculopathy    Relevant Medications    HYDROcodone-acetaminophen (Norco) 5-325 mg tablet (Start on 2/3/2025)    HYDROcodone-acetaminophen (Norco) 5-325 mg tablet (Start on 3/3/2025)    gabapentin (Neurontin) 300 mg capsule    Chronic radicular lumbar pain    Relevant Medications    HYDROcodone-acetaminophen (Norco) 5-325 mg tablet (Start on 2/3/2025)    HYDROcodone-acetaminophen (Norco) 5-325 mg tablet (Start on 3/3/2025)    gabapentin (Neurontin) 300 mg capsule    Long term (current) use of opiate analgesic    Relevant Medications    HYDROcodone-acetaminophen (Norco) 5-325 mg tablet (Start on 2/3/2025)    HYDROcodone-acetaminophen (Norco) 5-325 mg tablet (Start on 3/3/2025)    gabapentin (Neurontin) 300 mg capsule        Plan  Reviewed the pain generators.  Went over the  types of pain with neuropathic and nociceptive and different pathologies and therapeutic modalities. Discussed the mechanism of action of interventions from acupuncture, physical therapy , regular exercises, injections, botox, spinal cord stimulation, and role of surgery     Went over pathology of the intervertebral disc displacement and the anatomical relation to the Nerve roots and relation to the radicular symptoms. Went over treatment modalities with conservative treatment including acupuncture   and epidural steroid injection with fluoroscopy guidance and last resort of surgery    Based on the above findings and the clinical response to the opioids medications and improvement of the activities of daily living, sleep, and work performance. We made this complex decision to continue the opioids therapy in light of the evidence of the patient's responsibility in using the pain medications as prescribed for the nonmalignant chronic pain condition. We discussed about the use of the pain medications to treat the symptoms of chronic nonmalignant pain and we are not trying the repair the permanent damage in the tissues, rather we are trying to control the symptoms induced by the permanent damage to the tissues inducing the chronic pain condition and resulting disability. I explained the difference and discussed it with the patient and stressed the importance of knowing the difference especially because of the potential side effects and the potential addicting effect and habit forming nature of the dangerous drugs we are using to treat the symptoms of the chronic pain.      We discussed that we are prescribing the medications on good manas and legitimate medical reason.     We reviewed the side effects and precautions of opioids prescriptions as discussed in the opioids treatment agreement.    realizes the interaction between the therapeutic classes including the respiratory depression and potential death     Random drug  testing   we will submit     Consider injection for aggravation.  I agreed with her to keep gabapentin at twice a day only hydrocodone at 10 MEDD we will not change that at this time however with the summertime she usually cut back to half or 1 tablet the day    Discussed about NSAIDS and I explained about the opioids sparing effect to allow keeping the opioids dose at minimal effective dose.   I went over the potential side effects of the NSAIDS on the gastrointestinal, renal and cardiovascular systems.      I detailed the side effects from the acetaminophen in the medication and made aware of those. I also explained about the cumulative effects on the organs and mainly the liver.     Given the opioids therapy , we discussed about the risk for accidental over dose on the pain medications, either for patient or other household. I went over the mechanism of action and mode of use of the Naloxone according to the  recommendations. I will provide a prescription for a kit.     Follow-up 8 weeks or earlier if needed     The level of clinical decision making in this office visit,  is high, given the high risks of complications with the morbidity and mortality due to the fact that acute and chronic pain may pose a threat to life and bodily function, if under treated, poorly treated, or with failure to maintain adequate treatment and timely medical follow up. Additionally over treatment has its own set of complications including overdosing on the pain medications and also the habit forming potentials with the use of the medications used to treat chronic painful conditions including therapeutic classes classified as dangerous medications. Given the serious and fluctuating nature of pain level and instensity with extensive consideration for whenever pain changes, there is always the risk of prolonged functional impairment requiring close patient monitoring with regular assessments and reassessments and high level  medical decision making at every office visit. The amount and complexity of data reviewed is high given the patient clinical presentation, labs,  data, radiology reports, and other tests as discussed during office visits. Pertinent data whether positive or negative were taken in consideration in the process of making this high level medical decision.

## 2025-02-10 ENCOUNTER — APPOINTMENT (OUTPATIENT)
Dept: PULMONOLOGY | Facility: CLINIC | Age: 70
End: 2025-02-10
Payer: MEDICARE

## 2025-02-12 ENCOUNTER — OFFICE VISIT (OUTPATIENT)
Dept: CARDIOLOGY | Facility: CLINIC | Age: 70
End: 2025-02-12
Payer: MEDICARE

## 2025-02-12 VITALS
HEART RATE: 75 BPM | BODY MASS INDEX: 39.61 KG/M2 | DIASTOLIC BLOOD PRESSURE: 83 MMHG | OXYGEN SATURATION: 97 % | HEIGHT: 64 IN | SYSTOLIC BLOOD PRESSURE: 145 MMHG | WEIGHT: 232 LBS

## 2025-02-12 DIAGNOSIS — Z86.39 HISTORY OF HYPERCHOLESTEROLEMIA: ICD-10-CM

## 2025-02-12 DIAGNOSIS — I25.10 CORONARY ARTERY DISEASE INVOLVING NATIVE CORONARY ARTERY OF NATIVE HEART WITHOUT ANGINA PECTORIS: Primary | ICD-10-CM

## 2025-02-12 DIAGNOSIS — Z86.79 HISTORY OF HYPERTENSION: ICD-10-CM

## 2025-02-12 LAB
ATRIAL RATE: 74 BPM
P AXIS: 11 DEGREES
P OFFSET: 188 MS
P ONSET: 139 MS
PR INTERVAL: 170 MS
Q ONSET: 224 MS
QRS COUNT: 12 BEATS
QRS DURATION: 78 MS
QT INTERVAL: 374 MS
QTC CALCULATION(BAZETT): 415 MS
QTC FREDERICIA: 401 MS
R AXIS: 46 DEGREES
T AXIS: 48 DEGREES
T OFFSET: 411 MS
VENTRICULAR RATE: 74 BPM

## 2025-02-12 PROCEDURE — 1160F RVW MEDS BY RX/DR IN RCRD: CPT | Performed by: NURSE PRACTITIONER

## 2025-02-12 PROCEDURE — 1159F MED LIST DOCD IN RCRD: CPT | Performed by: NURSE PRACTITIONER

## 2025-02-12 PROCEDURE — 1125F AMNT PAIN NOTED PAIN PRSNT: CPT | Performed by: NURSE PRACTITIONER

## 2025-02-12 PROCEDURE — 99214 OFFICE O/P EST MOD 30 MIN: CPT | Performed by: NURSE PRACTITIONER

## 2025-02-12 PROCEDURE — 4010F ACE/ARB THERAPY RXD/TAKEN: CPT | Performed by: NURSE PRACTITIONER

## 2025-02-12 PROCEDURE — 3077F SYST BP >= 140 MM HG: CPT | Performed by: NURSE PRACTITIONER

## 2025-02-12 PROCEDURE — 3008F BODY MASS INDEX DOCD: CPT | Performed by: NURSE PRACTITIONER

## 2025-02-12 PROCEDURE — 93005 ELECTROCARDIOGRAM TRACING: CPT | Performed by: NURSE PRACTITIONER

## 2025-02-12 PROCEDURE — 3079F DIAST BP 80-89 MM HG: CPT | Performed by: NURSE PRACTITIONER

## 2025-02-12 PROCEDURE — 93010 ELECTROCARDIOGRAM REPORT: CPT | Performed by: INTERNAL MEDICINE

## 2025-02-12 ASSESSMENT — PAIN SCALES - GENERAL: PAINLEVEL_OUTOF10: 5

## 2025-02-12 NOTE — PATIENT INSTRUCTIONS
Monitor your BP and call with any elevated readings     Follow up in 6 months     Call for any concerns

## 2025-02-12 NOTE — PROGRESS NOTES
Subjective   Amanda De Jesus is a 69 y.o. female.    Chief Complaint:  Coronary Artery Disease, Hypertension, and Hyperlipidemia    Mrs. De Jesus returns for a routine 6 month follow up. She has been feeling well from a cardiac standpoint. I had started her on spironolactone a few months ago, though unfortunately this affected her kidney function and potassium levels. Her PCP discontinued it, though did not replace it with any other antihypertensive agents. She has been under quite a bit of stress with her son in law being ill and in the hospital. She unfortunately continues to smoke, though admits to using it as a crutch right now. She denies any chest pain or shortness of breath at her current activity level. She offers no new cardiovascular complaints or concerns today. denies any complaints of chest pain, shortness of breath, lightheadedness, dizziness, palpitations, syncope, orthopnea, paroxysmal nocturnal dyspnea, lower extremity swelling or bleeding concerns.               Review of Systems   All other systems reviewed and are negative.      Objective   Physical Exam  Constitutional:       Appearance: Healthy appearance. In no distress  Pulmonary:      Effort: Pulmonary effort is normal.      Breath sounds: Normal breath sounds.   Cardiovascular:      Normal rate. Regular rhythm. Normal S1. Normal S2.       Murmurs: There is no murmur.      Carotids: right carotid pulse +2, no bruit heard over the right carotid. left carotid pulse +2, no bruit heard over the left carotid.  Edema:     Peripheral edema absent.   Abdominal:      Palpations: Abdomen is soft.   Musculoskeletal:       Cervical back: Normal range of motion.   Skin:     General: Skin is warm and dry. Normal color and pigmentation   Neurological:      Mental Status: Alert and oriented to person, place and time.   Psychiatric:     Mood and Affect: appropriate mood and appropriate affect.     EKG obtained and reviewed. Normal sinus rhythm. Anterior infarct,  age undetermined. HR 74      Lab Review:   Lab Results   Component Value Date     12/31/2024    K 5.6 (H) 12/31/2024     12/31/2024    CO2 23 12/31/2024    BUN 40 (H) 12/31/2024    CREATININE 1.85 (H) 12/31/2024    GLUCOSE 115 (H) 12/31/2024    CALCIUM 9.7 12/31/2024     Lab Results   Component Value Date    WBC 5.9 02/27/2024    HGB 15.2 02/27/2024    HCT 46.0 02/27/2024    MCV 96 02/27/2024     02/27/2024     Lab Results   Component Value Date    CHOL 158 02/27/2024    TRIG 214 (H) 02/27/2024    HDL 53.3 02/27/2024       Assessment/Plan   Mrs. De Jesus is a pleasant 69 year old  female with a past medical history significant for hypertension, hyperlipidemia, CKD (with reported solitary kidney), T2DM, chronic back pain, AAA followed by vascular, nicotine dependence and CAD s/p remote mid and distal RCA stenting in 2006. NM stress test 6/2017 showed no evidence of stress induced ischemia or scar. Echocardiogram 5/2024 showed an EF of 65% with low normal RV systolic function and no significant valvular disease. She presents today for routine follow up stable from a cardiac standpoint. She unfortunately was unable to tolerate spironolactone due to worsening kidney function. Her BP is mildly elevated today, and she has not been monitoring her BP at home very consistently. I will have her continue all medications unchanged. She was encouraged to start monitoring her BP at home, and knows to call for any consistently elevated readings. She was also highly encouraged to work on her smoking cessation. She will follow up with us in clinic in 6 months. She knows to call for any concerns.

## 2025-02-17 PROCEDURE — RXMED WILLOW AMBULATORY MEDICATION CHARGE

## 2025-02-20 ENCOUNTER — PHARMACY VISIT (OUTPATIENT)
Dept: PHARMACY | Facility: CLINIC | Age: 70
End: 2025-02-20
Payer: COMMERCIAL

## 2025-02-23 DIAGNOSIS — M51.369 DEGENERATIVE DISC DISEASE, LUMBAR: ICD-10-CM

## 2025-02-23 DIAGNOSIS — G89.29 CHRONIC RADICULAR LUMBAR PAIN: ICD-10-CM

## 2025-02-23 DIAGNOSIS — M51.26 HERNIATED NUCLEUS PULPOSUS, L4-5: ICD-10-CM

## 2025-02-23 DIAGNOSIS — M54.16 RIGHT LUMBAR RADICULOPATHY: ICD-10-CM

## 2025-02-23 DIAGNOSIS — Z79.891 LONG TERM (CURRENT) USE OF OPIATE ANALGESIC: ICD-10-CM

## 2025-02-23 DIAGNOSIS — M51.27 OTHER INTERVERTEBRAL DISC DISPLACEMENT, LUMBOSACRAL REGION: ICD-10-CM

## 2025-02-23 DIAGNOSIS — M54.16 CHRONIC RADICULAR LUMBAR PAIN: ICD-10-CM

## 2025-02-23 RX ORDER — GABAPENTIN 300 MG/1
300 CAPSULE ORAL 2 TIMES DAILY
Qty: 60 CAPSULE | Refills: 0 | Status: SHIPPED | OUTPATIENT
Start: 2025-02-23

## 2025-03-04 ENCOUNTER — TELEMEDICINE (OUTPATIENT)
Dept: PHARMACY | Facility: HOSPITAL | Age: 70
End: 2025-03-04
Payer: MEDICARE

## 2025-03-04 ENCOUNTER — APPOINTMENT (OUTPATIENT)
Dept: PHARMACY | Facility: HOSPITAL | Age: 70
End: 2025-03-04
Payer: MEDICARE

## 2025-03-04 DIAGNOSIS — N39.41 URGE INCONTINENCE: Primary | ICD-10-CM

## 2025-03-04 NOTE — PROGRESS NOTES
Patient ID: Amanda De Jesus is a 70 y.o. female who presents for Urge incontinence .    Referring Provider: Janett Giles   Pt was referred for cost assistance for Myrbetriq     Preferred Pharmacy:    [id number] - [place], [place] - [address]  [address] [facility] [address]  Phone: [phone] Fax: [phone]    [facility] - [place], [place] - [address]  [address]  [address]  [facility] [address]  Phone: [phone] Fax: [phone]    [facility]  [address], [address]  [facility] [address]  Phone: [phone] Fax: [phone]      Copay assistance:   Do you have copays on your medications? Yes  Do you have trouble affording your current medications? Yes     Patient Assistance Screening (VAF)    Patient verbally reports monthly or yearly income which is less than 400% federal poverty level   Application for program has been submitted for the following medications:   Myrbetriq   Patient has been informed that program team will be reaching out to them to discuss necessary documentation, instructed to answer phone/return voicemail.   Patient aware this process may take up to 6 weeks.   If approved medication must be filled through [facility] pharmacy and may be picked up or mailed to patient.       Subjective      Vaginal Symptoms  Vaginal Dryness: None   Dysuria (pain, burning, stinging, or itching with urination): None (only had burning when had UTI)  Vaginal and/or vulvar irritation/itching: No   Recurrent urinary tract infections: yes     Urinary Symptoms  Medications that may contribute to symptoms:   Diuretics - discussed previously   Any history of:   Narrow-angle glaucoma? No   Impaired gastric emptying? No   Urinary retention? No     If diabetes, blood sugar controlled? HbA1c 6.4%  Frequently of bowel movement? Couple times a day   Tobacco use? Yes, smoke half a pack a day   How many protective undergarments are  "you utilizing daily? Panty liners     What medications have been tried/stopped?   Oxybutynin/trospium - didn't work and nausea   Current medication? Myrbetriq 50 mg daily   When started? Restarted in June but has been out the last couple days   Side effects? No  Improvement in symptoms? Yes    Cardiovascular Health  The ASCVD Risk score (Lance ADAMS, et al., 2019) failed to calculate for the following reasons:    Risk score cannot be calculated because patient has a medical history suggesting prior/existing ASCVD    Lab Results   Component Value Date    CHOL 158 02/27/2024     Lab Results   Component Value Date    HDL 53.3 02/27/2024     Lab Results   Component Value Date    LDLCALC 62 02/27/2024     Lab Results   Component Value Date    TRIG 214 (H) 02/27/2024     No components found for: \"CHOLHDL\"        Blood Sugar Balance  Lab Results   Component Value Date    GLUCOSE 115 (H) 12/31/2024    HGBA1C 6.4 (H) 02/27/2024    HGBA1C 6.4 (H) 02/27/2024    HGBA1C 6.6 (H) 10/23/2023    HGBA1C 6.6 (H) 10/23/2023     No results found for: \"LEPTIN\", \"INSULFAST\", \"GLUF\"      Thyroid  Lab Results   Component Value Date    TSH 1.77 10/23/2023    FREET4 1.24 06/16/2021       Iron Status  No results found for: \"IRON\", \"TIBC\", \"FERRITIN\"     Kidney Function  Lab Results   Component Value Date    GFRF 54 (A) 09/30/2022    CREATININE 1.85 (H) 12/31/2024       Potassium  Lab Results   Component Value Date    K 5.6 (H) 12/31/2024        Vitamin D3  Lab Results   Component Value Date    VITD25 39 08/05/2022       Current Outpatient Medications on File Prior to Visit   Medication Sig Dispense Refill    amLODIPine (Norvasc) 5 mg tablet Take 1 tablet (5 mg) by mouth once daily in the morning. Take before meals.      ascorbic acid, vitamin C, 500 mg capsule Take by mouth. Take as directed      aspirin 81 mg EC tablet Take 1 tablet (81 mg) by mouth once daily.      atorvastatin (Lipitor) 80 mg tablet Take 1 tablet (80 mg) by mouth once daily.  "     cyanocobalamin (Vitamin B-12) 1,000 mcg tablet Take by mouth once daily.      d-mannose 500 mg capsule Take 3 capsules (1,500 mg) by mouth 2 times a day.      ezetimibe (Zetia) 10 mg tablet Take 1 tablet (10 mg) by mouth once daily. 90 tablet 3    famotidine-Ca carb-mag hydrox (Pepcid Complete) -165 mg chewable tablet Chew 1 tablet every 12 hours if needed.      furosemide (Lasix) 40 mg tablet Take 1 tablet (40 mg) by mouth once daily. 90 tablet 3    gabapentin (Neurontin) 300 mg capsule TAKE ONE CAPSULE BY MOUTH TWO TIMES A DAY 60 capsule 0    [] HYDROcodone-acetaminophen (Norco) 5-325 mg tablet Take 1 tablet by mouth every 12 hours if needed for severe pain (7 - 10) for up to 28 days. Do not fill before February 3, 2025. 56 tablet 0    HYDROcodone-acetaminophen (Norco) 5-325 mg tablet Take 1 tablet by mouth every 12 hours if needed for severe pain (7 - 10) for up to 28 days. Do not fill before March 3, 2025. 56 tablet 0    losartan (Cozaar) 100 mg tablet Take 1 tablet (100 mg) by mouth once daily in the morning. Take before meals.      metFORMIN  mg 24 hr tablet Take 1 tablet (500 mg) by mouth once daily in the evening.      metoprolol succinate XL (Toprol-XL) 100 mg 24 hr tablet Take 1 tablet (100 mg) by mouth once daily.      mirabegron (Myrbetriq) 50 mg tablet extended release 24 hr 24 hr tablet Take 1 tablet (50 mg) by mouth once daily. 90 tablet 3    multivitamin tablet Take 1 tablet by mouth once daily.      naloxone (Narcan) 4 mg/0.1 mL nasal spray Administer 1 spray (4 mg) into affected nostril(s) if needed for opioid reversal for up to 2 doses. May repeat every 2-3 minutes if needed, alternating nostrils, until medical assistance becomes available. 2 each 0    potassium chloride CR 10 mEq ER tablet Take 1 tablet (10 mEq) by mouth once daily.      zinc gluconate 100 mg tablet Take by mouth once daily. Zinc 100 MG Oral Tablet; TAKE AS DIRECTED.       No current facility-administered  "medications on file prior to visit.        Medication and allergy reconciliation completed     Drug Interactions   No significant drug interactions identified. Metoprolol and Myrbetriq noted but timed separately.     Assessment/Plan     Patient is experiencing urinary frequency, urgency, and incontinence. She reports doing well on Mybetriq and is able to \"pee normally.\" Did notice her last renal function showed GFR of 29. Given this recommended rechecking lab work. She mentions she is already scheduled for lab work by her nephrologist in about a month (outside ). Recommended for now to adjust Myrbetriq to 50 mg every other day until repeat lab work comes back and can determine if dose adjustment is needed. Patient did mention that around the time that labs were last done, her blood pressure meds were being adjusted. Anticipate that GFR will likely be increased on repeat, but for now will adjust Myrbetriq. Patient agreeable with plan. Will follow up once labs back.   Has tried before Oxybutynin/trospium.  Patient approved for [facility]    Myrbetriq  Discussed MOA: activates beta-3 adrenergic receptors in the bladder resulting in relaxation of the detrusor smooth muscle during the urine storage phase, thus increasing bladder capacity.  Provided education on administration (swallow whole with water; do not chew, divide, or crush) and potential side effects including but not limited to headache, nose or throat irritation, dry mouth, and constipation. Any signs or symptoms of angioedema- immediately discontinue use and seek immediate medical attention.   Monitor blood pressure and heart rate. May notice a slight increase. Please call me if blood pressure becomes >120/80 mmHg or pulse significantly elevates from baseline.   BP Readings from Last 3 Encounters:   02/12/25 145/83   11/11/24 141/87   10/14/24 165/82   Patient mentions that regular BP for her is in 140s/80s  Caution with other meds that prolong QT interval- " many drug:drug interactions   Patient is in agreement that they will notify me if starting any new medications  Efficacy is seen within 8 weeks; steady state achieved within 7 days.      LABS  Lab Results   Component Value Date    GFRF 54 (A) 09/30/2022     GFR 29 mL/min as of 12/31/2024    ADJUST  Myrbetriq 50 mg to every other day and get lab work done in a month    Follow-up: 4/8/2025 at 10 am with me and 8/4/25 @ 10:40 am (with [facility] for PAP renewal)     Time spent with pt: Total length of time 30 (minutes) of the encounter and more than 50% was spent counseling the patient.     Patient Assistance Program Approval:     We are pleased to inform you that your application for assistance has been approved.     This approval is valid through  9/3/2025  as long as the following criteria continue to be satisfied:     Your medication (Myrbetriq) remains covered under your current insurance plan.   Your prescriber does not discontinue therapy.   You do not seek reimbursement from any other private or government-funded programs for the  medication.    Under this program, the pharmacy will first bill your insurance plan for your indemnified specified medication. The [employer] Assistance Fund will then offset your copay balance, so that your out-of pocket expense for your specialty medication will be $0.00.      Garth MalagonD      Continue all meds under the continuation of care with the referring provider and clinical pharmacy team.    Verbal consent to manage patient's drug therapy was obtained from the patient and/or an individual authorized to act on behalf of a patient. They were informed they may decline to participate or withdraw from participation in pharmacy services at any time.

## 2025-03-04 NOTE — Clinical Note
Noted last GFR 29, recommended repeating lab work. She has it schedule in a month through her nephrologist. Advised her to take every other day until repeat labs. Will follow up after labs

## 2025-03-24 DIAGNOSIS — M51.27 OTHER INTERVERTEBRAL DISC DISPLACEMENT, LUMBOSACRAL REGION: ICD-10-CM

## 2025-03-24 DIAGNOSIS — G89.29 CHRONIC RADICULAR LUMBAR PAIN: ICD-10-CM

## 2025-03-24 DIAGNOSIS — M51.369 DEGENERATIVE DISC DISEASE, LUMBAR: ICD-10-CM

## 2025-03-24 DIAGNOSIS — M54.16 RIGHT LUMBAR RADICULOPATHY: ICD-10-CM

## 2025-03-24 DIAGNOSIS — M54.16 CHRONIC RADICULAR LUMBAR PAIN: ICD-10-CM

## 2025-03-24 DIAGNOSIS — Z79.891 LONG TERM (CURRENT) USE OF OPIATE ANALGESIC: ICD-10-CM

## 2025-03-24 DIAGNOSIS — M51.26 HERNIATED NUCLEUS PULPOSUS, L4-5: ICD-10-CM

## 2025-03-24 RX ORDER — GABAPENTIN 300 MG/1
300 CAPSULE ORAL 2 TIMES DAILY
Qty: 60 CAPSULE | Refills: 0 | Status: SHIPPED | OUTPATIENT
Start: 2025-03-24 | End: 2025-03-27 | Stop reason: SDUPTHER

## 2025-03-27 ENCOUNTER — APPOINTMENT (OUTPATIENT)
Dept: PAIN MEDICINE | Facility: CLINIC | Age: 70
End: 2025-03-27
Payer: MEDICARE

## 2025-03-27 VITALS — HEIGHT: 64 IN | OXYGEN SATURATION: 98 % | WEIGHT: 228 LBS | BODY MASS INDEX: 38.93 KG/M2

## 2025-03-27 DIAGNOSIS — M51.26 HERNIATED NUCLEUS PULPOSUS, L4-5: ICD-10-CM

## 2025-03-27 DIAGNOSIS — M54.16 RIGHT LUMBAR RADICULOPATHY: ICD-10-CM

## 2025-03-27 DIAGNOSIS — Z79.891 LONG TERM (CURRENT) USE OF OPIATE ANALGESIC: ICD-10-CM

## 2025-03-27 DIAGNOSIS — G89.29 CHRONIC RADICULAR LUMBAR PAIN: ICD-10-CM

## 2025-03-27 DIAGNOSIS — M54.16 CHRONIC RADICULAR LUMBAR PAIN: ICD-10-CM

## 2025-03-27 DIAGNOSIS — M51.27 OTHER INTERVERTEBRAL DISC DISPLACEMENT, LUMBOSACRAL REGION: ICD-10-CM

## 2025-03-27 DIAGNOSIS — M51.369 DEGENERATIVE DISC DISEASE, LUMBAR: ICD-10-CM

## 2025-03-27 PROCEDURE — G2211 COMPLEX E/M VISIT ADD ON: HCPCS | Performed by: PHYSICAL MEDICINE & REHABILITATION

## 2025-03-27 PROCEDURE — 1159F MED LIST DOCD IN RCRD: CPT | Performed by: PHYSICAL MEDICINE & REHABILITATION

## 2025-03-27 PROCEDURE — 1160F RVW MEDS BY RX/DR IN RCRD: CPT | Performed by: PHYSICAL MEDICINE & REHABILITATION

## 2025-03-27 PROCEDURE — 3008F BODY MASS INDEX DOCD: CPT | Performed by: PHYSICAL MEDICINE & REHABILITATION

## 2025-03-27 PROCEDURE — 99214 OFFICE O/P EST MOD 30 MIN: CPT | Performed by: PHYSICAL MEDICINE & REHABILITATION

## 2025-03-27 PROCEDURE — 4010F ACE/ARB THERAPY RXD/TAKEN: CPT | Performed by: PHYSICAL MEDICINE & REHABILITATION

## 2025-03-27 RX ORDER — HYDROCODONE BITARTRATE AND ACETAMINOPHEN 5; 325 MG/1; MG/1
1 TABLET ORAL EVERY 12 HOURS PRN
Qty: 56 TABLET | Refills: 0 | Status: SHIPPED | OUTPATIENT
Start: 2025-03-27 | End: 2025-04-24

## 2025-03-27 RX ORDER — GABAPENTIN 300 MG/1
300 CAPSULE ORAL 2 TIMES DAILY
Qty: 60 CAPSULE | Refills: 0 | Status: SHIPPED | OUTPATIENT
Start: 2025-03-27

## 2025-03-27 RX ORDER — HYDROCODONE BITARTRATE AND ACETAMINOPHEN 5; 325 MG/1; MG/1
1 TABLET ORAL EVERY 12 HOURS PRN
Qty: 56 TABLET | Refills: 0 | Status: SHIPPED | OUTPATIENT
Start: 2025-04-24 | End: 2025-05-22

## 2025-03-27 ASSESSMENT — PATIENT HEALTH QUESTIONNAIRE - PHQ9
10. IF YOU CHECKED OFF ANY PROBLEMS, HOW DIFFICULT HAVE THESE PROBLEMS MADE IT FOR YOU TO DO YOUR WORK, TAKE CARE OF THINGS AT HOME, OR GET ALONG WITH OTHER PEOPLE: NOT DIFFICULT AT ALL
SUM OF ALL RESPONSES TO PHQ9 QUESTIONS 1 AND 2: 1
2. FEELING DOWN, DEPRESSED OR HOPELESS: SEVERAL DAYS
1. LITTLE INTEREST OR PLEASURE IN DOING THINGS: NOT AT ALL

## 2025-03-27 ASSESSMENT — ENCOUNTER SYMPTOMS
DEPRESSION: 0
OCCASIONAL FEELINGS OF UNSTEADINESS: 0
LOSS OF SENSATION IN FEET: 0

## 2025-03-27 NOTE — PROGRESS NOTES
Chief complaint  Back and lower limbs pain      Leatha BUSTAMANTE SHELLI,   was present during the entire history and physical examination    History  Amanda De Jesus is back for pain management office visit  Continues with pain in the back and lower limbs  The pain meds control the baseline pain. With the aggravation  she gets robert to bring aggravation  down  She did not want to have SCS at this time  Back pain is mechanical  Radicular pain is neuropathic The pain burning and tingling on a continuous fashion. The pain goes in aggravation intermittently with sharp lancinating, electrical like jolts and sensations. These are felt on the skin and deeper in the tissues.  The pain is affected with colder weather and with wet and humid conditions.     Detailed discussion and explanation about the need to try  and cut back on pain medications.  Entertained question about   pain level changing from acute, subacute to chronic pain.  Currently the pain and chronic.  The higher amount of medication were for the acute and subacute phase.  Therefore   we do not know exactly if the medications at this amount are still needed until we try and cut back slowly on pain medications. If the cut is tolerated then we continue trying to cut back further. If cut not tolerated then, will try additional none chemical methods like injection or physical therapy or we can go back on the pain medications level.  The goal from this is to keep the pain medications at the lowest effective dose and to control the tolerance that develops from the chronic use of opioid therapy.  Our goal is to keep the pain controlled with minimal effective dose.  That will help cutting back on the potential for side effects, and also will help decrease the amount of tolerance developing from the chronic use of opioid medication.       Pain level without medication is 8/10 , with the medication pain level 2/10.     Pain disability index (PDI) improvement by 3 to 4  points, across  "different functional categories, with the pain control with the meds. Forms filled by patient are scanned in the chart    The pain meds are helping control the pain and improving Activities of Daily living and quality of life and quality of sleep.    opioids treatment agreement Jan 2025    Pill count today, using count tray, and in front of patient, Nurse and myself :  5 hydrocodone    pills , last fill was on 2/8  for 56 tabs,  she did not fill in March bc of death in the family and she has been stretching the pain meds   Oarrs pulled and reviewed, no concerns  last urine toxicology testing was compliant this was done on : Nov 2024  Xray updated spine   ORT (opioid risk tool) score is  0  Pain pathology and pain generators spine   Modalities tried injection, surgery, physical therapy, TENS unit, nonsteroidal anti-inflammatory medication       Review of Systems :  Denied any fever or chills. No weight loss and no night sweats. No cough or sputum production. No diarrhea   The constipation has been responding to fibers and over the counter medications.     No bladder and bowel incontinence and no other changes in bladder and bowel. No skin changes.  Reports tiredness and fatigability only if the pain is not controlled.     Denied opioids diversion and abuse and denies alcoholism. Denies overuse of the pain medications.  No reported euphoria sensation or getting a \"high\" on the pain medications.    The control of the pain with the pain medications is helping the control of the symptoms and allowing the function and activities of daily living, enjoyment of life, improving the quality of life and sleep with less interruption by the pain. The goal is symptomatic control of the nonmalignant chronic pain and not to repair the permanent damage in the tissues inducing the chronic pain conditions. We are aiming to shift the focus from the nonmalignant chronic pain to other aspects of life by symptomatically treating this " chronic pain. If this pain is not treated it will lead to major morbidity and it is also associated with increased risks of mortality. The patient understands those very clearly and also understand high risks of morbidity and mortality if not strictly adherent to the treatment recommendations and reporting any associated side effects. Also patient understand the full responsibility associated with these medications to avoid abuse or overuse or any use of these medications for anything besides treating the patient's own chronic pain and nothing else under any circumstances.        Physical examination  Awake, alert and oriented for time place and persons   Pupils are equal and reactive to light and accommodation    SLR increased pain in the back  Mann negative  No abberant pain behavior  Decreaed sensory on lateral leg  Big toes at 4/5   plantar cutaneous reflex are down going bilaterally     Diagnosis  Problem List Items Addressed This Visit       Degenerative disc disease, lumbar    Relevant Medications    HYDROcodone-acetaminophen (Norco) 5-325 mg tablet    HYDROcodone-acetaminophen (Norco) 5-325 mg tablet (Start on 4/24/2025)    gabapentin (Neurontin) 300 mg capsule    Herniated nucleus pulposus, L4-5    Relevant Medications    HYDROcodone-acetaminophen (Norco) 5-325 mg tablet    HYDROcodone-acetaminophen (Norco) 5-325 mg tablet (Start on 4/24/2025)    gabapentin (Neurontin) 300 mg capsule    Other intervertebral disc displacement, lumbosacral region    Relevant Medications    HYDROcodone-acetaminophen (Norco) 5-325 mg tablet    HYDROcodone-acetaminophen (Norco) 5-325 mg tablet (Start on 4/24/2025)    gabapentin (Neurontin) 300 mg capsule    Right lumbar radiculopathy    Relevant Medications    HYDROcodone-acetaminophen (Norco) 5-325 mg tablet    HYDROcodone-acetaminophen (Norco) 5-325 mg tablet (Start on 4/24/2025)    gabapentin (Neurontin) 300 mg capsule    Chronic radicular lumbar pain    Relevant  Medications    HYDROcodone-acetaminophen (Norco) 5-325 mg tablet    HYDROcodone-acetaminophen (Norco) 5-325 mg tablet (Start on 4/24/2025)    gabapentin (Neurontin) 300 mg capsule    Long term (current) use of opiate analgesic    Relevant Medications    HYDROcodone-acetaminophen (Norco) 5-325 mg tablet    HYDROcodone-acetaminophen (Norco) 5-325 mg tablet (Start on 4/24/2025)    gabapentin (Neurontin) 300 mg capsule        Plan  Reviewed the pain generators.  Went over the types of pain with neuropathic and nociceptive and different pathologies and therapeutic modalities. Discussed the mechanism of action of interventions from acupuncture, physical therapy , regular exercises, injections, botox, spinal cord stimulation, and role of surgery     Went over pathology of the intervertebral disc displacement and the anatomical relation to the Nerve roots and relation to the radicular symptoms. Went over treatment modalities with conservative treatment including acupuncture   and epidural steroid injection with fluoroscopy guidance and last resort of surgery    Based on the above findings and the clinical response to the opioids medications and improvement of the activities of daily living, sleep, and work performance. We made this complex decision to continue the opioids therapy in light of the evidence of the patient's responsibility in using the pain medications as prescribed for the nonmalignant chronic pain condition. We discussed about the use of the pain medications to treat the symptoms of chronic nonmalignant pain and we are not trying the repair the permanent damage in the tissues, rather we are trying to control the symptoms induced by the permanent damage to the tissues inducing the chronic pain condition and resulting disability. I explained the difference and discussed it with the patient and stressed the importance of knowing the difference especially because of the potential side effects and the potential  addicting effect and habit forming nature of the dangerous drugs we are using to treat the symptoms of the chronic pain.      We discussed that we are prescribing the medications on good manas and legitimate medical reason within the scope of professional medical practice.     We reviewed the side effects and precautions of opioids prescriptions as discussed in the opioids treatment agreement.    realizes the interaction between the therapeutic classes including the respiratory depression and potential death     Random drug testing   we will submit     Continue with pain meds  Leland for burning pian   DAYSI for aggravation   Did not want to have SCS for the baseline pain    Discussed about NSAIDS and I explained about the opioids sparing effect to allow keeping the opioids dose at minimal effective dose.   I went over the potential side effects of the NSAIDS on the gastrointestinal, renal and cardiovascular systems.      I detailed the side effects from the acetaminophen in the medication and made aware of those. I also explained about the cumulative effects on the organs and mainly the liver.     Given the opioids therapy , we discussed about the risk for accidental over dose on the pain medications, either for patient or other household. I went over the mechanism of action and mode of use of the Naloxone according to the  recommendations. I will provide a prescription for a kit.     Follow-up 8 weeks or earlier if needed     The level of clinical decision making in this office visit,  is high, given the high risks of complications with the morbidity and mortality due to the fact that acute and chronic pain may pose a threat to life and bodily function, if under treated, poorly treated, or with failure to maintain adequate treatment and timely medical follow up. Additionally over treatment has its own set of complications including overdosing on the pain medications and also the habit forming potentials  with the use of the medications used to treat chronic painful conditions including therapeutic classes classified as dangerous medications. Given the serious and fluctuating nature of pain level and instensity with extensive consideration for whenever pain changes, there is always the risk of prolonged functional impairment requiring close patient monitoring with regular assessments and reassessments and high level medical decision making at every office visit. The amount and complexity of data reviewed is high given the patient clinical presentation, labs,  data, radiology reports, and other tests as discussed during office visits. Pertinent data whether positive or negative were taken in consideration in the process of making this high level medical decision.

## 2025-04-08 ENCOUNTER — APPOINTMENT (OUTPATIENT)
Dept: PHARMACY | Facility: HOSPITAL | Age: 70
End: 2025-04-08
Payer: MEDICARE

## 2025-04-08 DIAGNOSIS — N39.41 URGE INCONTINENCE: ICD-10-CM

## 2025-04-08 NOTE — PROGRESS NOTES
Patient ID: Amanda De Jesus is a 70 y.o. female who presents for Urge incontinence.    Referring Provider: Janett Giles   Pt was referred for cost assistance for Myrbetriq     Preferred Pharmacy:    [id number] - [place], [place] - [address]  [address] [facility] [address]  Phone: [phone] Fax: [phone]    [facility] - [place], [place] - [address]  [address]  [address]  [facility] [address]  Phone: [phone] Fax: [phone]    [facility]  [address], [address]  [facility] [address]  Phone: [phone] Fax: [phone]      Copay assistance:   Do you have copays on your medications? Yes  Do you have trouble affording your current medications? Yes     Patient Assistance Screening (VAF)    Patient verbally reports monthly or yearly income which is less than 400% federal poverty level   Application for program has been submitted for the following medications:   Myrbetriq   Patient has been informed that program team will be reaching out to them to discuss necessary documentation, instructed to answer phone/return voicemail.   Patient aware this process may take up to 6 weeks.   If approved medication must be filled through [facility] pharmacy and may be picked up or mailed to patient.       Subjective      Patient reports that her son-in-law recently just passed away. Due to this she has been running around, making arrangements, not eating/drinking as she should and has not gotten a chance to get labs work done. She has been taking the Myrbetriq 50 mg every other day since last visit. She reports no difference in urinary control since dose reduction. She continues to have improved urgency and has not had any accidents.     Vaginal Symptoms  Vaginal Dryness: None   Dysuria (pain, burning, stinging, or itching with urination): None (only had burning when had UTI)  Vaginal and/or vulvar irritation/itching: No  "  Recurrent urinary tract infections: yes     Urinary Symptoms  Medications that may contribute to symptoms:   Diuretics - discussed previously   Any history of:   Narrow-angle glaucoma? No   Impaired gastric emptying? No   Urinary retention? No     If diabetes, blood sugar controlled? HbA1c 6.4%  Frequently of bowel movement? Couple times a day   Tobacco use? Yes, smoke half a pack a day   How many protective undergarments are you utilizing daily? Panty liners     What medications have been tried/stopped?   Oxybutynin/trospium - didn't work and nausea   Current medication? Myrbetriq 50 mg every other day about a month ago   When started? Almost a year (with some gaps), reduced to every other day   Side effects? No  Improvement in symptoms? Yes     Cardiovascular Health  The ASCVD Risk score (Lance ADAMS, et al., 2019) failed to calculate for the following reasons:    Risk score cannot be calculated because patient has a medical history suggesting prior/existing ASCVD    Lab Results   Component Value Date    CHOL 158 02/27/2024     Lab Results   Component Value Date    HDL 53.3 02/27/2024     Lab Results   Component Value Date    LDLCALC 62 02/27/2024     Lab Results   Component Value Date    TRIG 214 (H) 02/27/2024     No components found for: \"CHOLHDL\"        Blood Sugar Balance  Lab Results   Component Value Date    GLUCOSE 115 (H) 12/31/2024    HGBA1C 6.4 (H) 02/27/2024    HGBA1C 6.4 (H) 02/27/2024    HGBA1C 6.6 (H) 10/23/2023    HGBA1C 6.6 (H) 10/23/2023     No results found for: \"LEPTIN\", \"INSULFAST\", \"GLUF\"      Thyroid  Lab Results   Component Value Date    TSH 1.77 10/23/2023    FREET4 1.24 06/16/2021       Iron Status  No results found for: \"IRON\", \"TIBC\", \"FERRITIN\"     Kidney Function  Lab Results   Component Value Date    GFRF 54 (A) 09/30/2022    CREATININE 1.85 (H) 12/31/2024       Potassium  Lab Results   Component Value Date    K 5.6 (H) 12/31/2024        Vitamin D3  Lab Results   Component Value Date "    VITD25 39 08/05/2022       Current Outpatient Medications on File Prior to Visit   Medication Sig Dispense Refill    amLODIPine (Norvasc) 5 mg tablet Take 1 tablet (5 mg) by mouth once daily in the morning. Take before meals.      ascorbic acid, vitamin C, 500 mg capsule Take by mouth. Take as directed      aspirin 81 mg EC tablet Take 1 tablet (81 mg) by mouth once daily.      atorvastatin (Lipitor) 80 mg tablet Take 1 tablet (80 mg) by mouth once daily.      cyanocobalamin (Vitamin B-12) 1,000 mcg tablet Take by mouth once daily.      d-mannose 500 mg capsule Take 3 capsules (1,500 mg) by mouth 2 times a day.      ezetimibe (Zetia) 10 mg tablet Take 1 tablet (10 mg) by mouth once daily. 90 tablet 3    famotidine-Ca carb-mag hydrox (Pepcid Complete) -165 mg chewable tablet Chew 1 tablet every 12 hours if needed.      furosemide (Lasix) 40 mg tablet Take 1 tablet (40 mg) by mouth once daily. 90 tablet 3    gabapentin (Neurontin) 300 mg capsule Take 1 capsule (300 mg) by mouth 2 times a day. 60 capsule 0    HYDROcodone-acetaminophen (Norco) 5-325 mg tablet Take 1 tablet by mouth every 12 hours if needed for severe pain (7 - 10) for up to 28 days. 56 tablet 0    [START ON 4/24/2025] HYDROcodone-acetaminophen (Norco) 5-325 mg tablet Take 1 tablet by mouth every 12 hours if needed for severe pain (7 - 10) for up to 28 days. Do not fill before April 24, 2025. 56 tablet 0    losartan (Cozaar) 100 mg tablet Take 1 tablet (100 mg) by mouth once daily in the morning. Take before meals.      metFORMIN  mg 24 hr tablet Take 1 tablet (500 mg) by mouth once daily in the evening.      metoprolol succinate XL (Toprol-XL) 100 mg 24 hr tablet Take 1 tablet (100 mg) by mouth once daily.      mirabegron (Myrbetriq) 50 mg tablet extended release 24 hr 24 hr tablet Take 1 tablet (50 mg) by mouth once daily. 90 tablet 3    multivitamin tablet Take 1 tablet by mouth once daily.      naloxone (Narcan) 4 mg/0.1 mL nasal spray  "Administer 1 spray (4 mg) into affected nostril(s) if needed for opioid reversal for up to 2 doses. May repeat every 2-3 minutes if needed, alternating nostrils, until medical assistance becomes available. 2 each 0    potassium chloride CR 10 mEq ER tablet Take 1 tablet (10 mEq) by mouth once daily.      zinc gluconate 100 mg tablet Take by mouth once daily. Zinc 100 MG Oral Tablet; TAKE AS DIRECTED.       No current facility-administered medications on file prior to visit.        Medication and allergy reconciliation completed     Drug Interactions   No significant drug interactions identified. Metoprolol and Myrbetriq noted but timed separately.     Assessment/Plan     Patient is experiencing urinary frequency, urgency, and incontinence. She reports doing well on Mybetriq and is able to \"pee normally.\" She has had no difference in bladder control since reducing to Myrbetriq 50 mg every other day. Given she has not gotten a chance to get repeat labs done, will continue Myrbetriq 50 mg every other day until supply is used up. Will order Myrbetriq 25 mg daily for after 50 mg supply used and educated patient on change. She is agreeable. Will follow up with her in about a month or so once she has had a chance to get labs done.  Has tried before Oxybutynin/trospium.  Patient approved for [facility]    Myrbetriq  Discussed MOA: activates beta-3 adrenergic receptors in the bladder resulting in relaxation of the detrusor smooth muscle during the urine storage phase, thus increasing bladder capacity.  Provided education on administration (swallow whole with water; do not chew, divide, or crush) and potential side effects including but not limited to headache, nose or throat irritation, dry mouth, and constipation. Any signs or symptoms of angioedema- immediately discontinue use and seek immediate medical attention.   Monitor blood pressure and heart rate. May notice a slight increase. Please call me if blood pressure becomes " >120/80 mmHg or pulse significantly elevates from baseline.   BP Readings from Last 3 Encounters:   02/12/25 145/83   11/11/24 141/87   10/14/24 165/82   Patient mentions that regular BP for her is in 140s/80s  Caution with other meds that prolong QT interval- many drug:drug interactions   Patient is in agreement that they will notify me if starting any new medications  Efficacy is seen within 8 weeks; steady state achieved within 7 days.      LABS  Lab Results   Component Value Date    GFRF 54 (A) 09/30/2022     GFR 29 mL/min as of 12/31/2024    CONTINUE  Myrbetriq 50 mg every other day until supply used THEN can adjust to Myrbetriq 25 mg daily     Follow-up: 5/16/2025 at 10 am with me and 8/4/25 @ 10:40 am (with [facility] for PAP renewal)     Time spent with pt: Total length of time 30 (minutes) of the encounter and more than 50% was spent counseling the patient.     Patient Assistance Program Approval:     We are pleased to inform you that your application for assistance has been approved.     This approval is valid through  9/3/2025  as long as the following criteria continue to be satisfied:     Your medication (Myrbetriq) remains covered under your current insurance plan.   Your prescriber does not discontinue therapy.   You do not seek reimbursement from any other private or government-funded programs for the  medication.    Under this program, the pharmacy will first bill your insurance plan for your indemnified specified medication. The [employer] will then offset your copay balance, so that your out-of pocket expense for your specialty medication will be $0.00.      Beryl Rodriguez, PharmD      Continue all meds under the continuation of care with the referring provider and clinical pharmacy team.    Verbal consent to manage patient's drug therapy was obtained from the patient and/or an individual authorized to act on behalf of a patient. They were informed they may decline to participate or  withdraw from participation in pharmacy services at any time.

## 2025-04-09 PROCEDURE — RXMED WILLOW AMBULATORY MEDICATION CHARGE

## 2025-04-09 RX ORDER — MIRABEGRON 25 MG/1
25 TABLET, FILM COATED, EXTENDED RELEASE ORAL DAILY
Qty: 30 TABLET | Refills: 11 | Status: SHIPPED | OUTPATIENT
Start: 2025-04-09

## 2025-04-14 ENCOUNTER — PHARMACY VISIT (OUTPATIENT)
Dept: PHARMACY | Facility: CLINIC | Age: 70
End: 2025-04-14
Payer: COMMERCIAL

## 2025-04-22 ENCOUNTER — APPOINTMENT (OUTPATIENT)
Dept: UROLOGY | Facility: CLINIC | Age: 70
End: 2025-04-22
Payer: MEDICARE

## 2025-04-22 VITALS
SYSTOLIC BLOOD PRESSURE: 159 MMHG | HEART RATE: 76 BPM | WEIGHT: 225 LBS | DIASTOLIC BLOOD PRESSURE: 86 MMHG | BODY MASS INDEX: 38.41 KG/M2 | TEMPERATURE: 97.2 F | HEIGHT: 64 IN

## 2025-04-22 DIAGNOSIS — B37.2 YEAST DERMATITIS: ICD-10-CM

## 2025-04-22 DIAGNOSIS — N39.41 URGE INCONTINENCE: ICD-10-CM

## 2025-04-22 DIAGNOSIS — N39.0 RECURRENT UTI: Primary | ICD-10-CM

## 2025-04-22 DIAGNOSIS — N95.8 GENITOURINARY SYNDROME OF MENOPAUSE: ICD-10-CM

## 2025-04-22 DIAGNOSIS — R19.7 DIARRHEA, UNSPECIFIED TYPE: ICD-10-CM

## 2025-04-22 LAB
POC APPEARANCE, URINE: CLEAR
POC BILIRUBIN, URINE: NEGATIVE
POC BLOOD, URINE: NEGATIVE
POC COLOR, URINE: YELLOW
POC GLUCOSE, URINE: NEGATIVE MG/DL
POC KETONES, URINE: NEGATIVE MG/DL
POC LEUKOCYTES, URINE: NEGATIVE
POC NITRITE,URINE: NEGATIVE
POC PH, URINE: 6 PH
POC PROTEIN, URINE: NEGATIVE MG/DL
POC SPECIFIC GRAVITY, URINE: 1.02
POC UROBILINOGEN, URINE: 0.2 EU/DL

## 2025-04-22 PROCEDURE — 3079F DIAST BP 80-89 MM HG: CPT | Performed by: NURSE PRACTITIONER

## 2025-04-22 PROCEDURE — 1159F MED LIST DOCD IN RCRD: CPT | Performed by: NURSE PRACTITIONER

## 2025-04-22 PROCEDURE — 99213 OFFICE O/P EST LOW 20 MIN: CPT | Performed by: NURSE PRACTITIONER

## 2025-04-22 PROCEDURE — G2211 COMPLEX E/M VISIT ADD ON: HCPCS | Performed by: NURSE PRACTITIONER

## 2025-04-22 PROCEDURE — 51798 US URINE CAPACITY MEASURE: CPT | Performed by: NURSE PRACTITIONER

## 2025-04-22 PROCEDURE — 4010F ACE/ARB THERAPY RXD/TAKEN: CPT | Performed by: NURSE PRACTITIONER

## 2025-04-22 PROCEDURE — 81003 URINALYSIS AUTO W/O SCOPE: CPT | Performed by: NURSE PRACTITIONER

## 2025-04-22 PROCEDURE — 3077F SYST BP >= 140 MM HG: CPT | Performed by: NURSE PRACTITIONER

## 2025-04-22 PROCEDURE — 3008F BODY MASS INDEX DOCD: CPT | Performed by: NURSE PRACTITIONER

## 2025-04-22 RX ORDER — ESTRADIOL 0.1 MG/G
CREAM VAGINAL
Qty: 42.5 G | Refills: 5 | Status: SHIPPED | OUTPATIENT
Start: 2025-04-22 | End: 2026-04-22

## 2025-04-22 RX ORDER — NYSTATIN 100000 [USP'U]/G
1 POWDER TOPICAL 2 TIMES DAILY
Qty: 60 G | Refills: 2 | Status: SHIPPED | OUTPATIENT
Start: 2025-04-22 | End: 2026-04-22

## 2025-04-22 NOTE — PROGRESS NOTES
04/22/25   [id number]    Follow up UTI, OAB     Subjective      HPI Amanda De Jesus is a 70 y.o. female who presents for follow up recurrent UTIs. Last seen 9/23/24 virtually;     Sadly, son yehuda sanchez passed away 4 weeks ago from difficult illness; additionally, her and her  now have to find new home as their current home is being torn down and difficult housing market with two dogs; bp elevated on arrival but after visit rechecked 158/86;     Difficult past week, difficult diarrhea, hx colitis, feels maybe flaring up again, seems like maybe GI infection occurring, hx diverticulosis, she is going to follow up with primary care or go to ER; bp elevated, feels maybe related to GI pain that is coming and going, almost went to ER;     On Myrbetriq 50mg every other day and that has worked for her; working with [employer]; that is working well; plans to use up and then take myrbetriq 25 mg daily; affordable now thru "[employer] assistance program; only couple accidents when she had infection;     Doesn't feel myrbetriq is cause of elevated bp today, more; BP in March 151/81; hx aneurysm; has been working on her bp meds, feet have been swelling; she will follow up with primary care sooner;     UA neg today, PVR 0cc    12/31/24 UA neg  12/31/24 creatinine 1.85, GFR 29  Urine 9/4/24 appeared possible UTI w wbc >50/hpf and rbc > 20 /hpf, squamous epi cells 10-25 noted and mucous; appeared contaminated; Antibiotics two days and felt better, feels fine now; no fever, no chills, no urgency; repeated urine and completely negative;   1/5/24 klebsiella+ urine culture  9/18/23 Ecoli + urine culture  4/25/23 Enterobacter +urine culture       11/5/24 CT abd/pel wo IV contrast:   KIDNEYS AND URETERS:   The right kidney is normal in size and not obstructed. There are tiny   nonobstructing right renal calculi.   The left kidney is atrophic. There is a cortical cyst.   The ureters are normal in caliber    BLADDER: The bladder is nearly empty D. Is  obscured by artifact from the hip   replacements.     11/1/24  KIDNEYS AND URETERS:  The right kidney is normal in size and not obstructed. There are tiny  nonobstructing right renal calculi. The left kidney is atrophic. There is a cortical cyst.  The ureters are normal in caliber; BLADDER: The bladder is nearly empty D. Is obscured by artifact from the hip replacements.    4/16/24 CT angio abdomen w/wo IV  Aneurysmal dilatation the maximal diameter inferior to the renal arteries as described and similar to the prior exam. Prominent mural thrombus and atherosclerotic changes as described.     Normal cystoscopy w Dr. Noble on 9/22/21    Dmanose, Estrogen vaginal cream, needs refills    didn't tolerate nightly prophylactic abx caused nausea (macrodantin and trimethoprim);     8/15/21 CT abd/pel w IV: atrophic left kidney, right kidney unremarkable;   8/20/2022 GUSTAVO no hydro, similar finding to CT w left atrophic kidney;     PMH: HTN, DMT2, CAD, Solitary kidney, dyslipidemia; sciatica;  PSH: cholecystectomy, knee and hip replacements  FH: mother breast cancer  SH: has smoked 40+ yrs 1/2-1 ppd     exam on 9/14/21 stage I cystocele, stage II rectocele; no uterine descent noted, moderate vaginal atrophy     Objective     There were no vitals taken for this visit.   Physical Exam  General: Appears comfortable and in no apparent distress, well nourished  Head: Normocephalic, atraumatic  Neck: trachea midline  Respiratory: respirations unlabored, no wheezes, and no use of accessory muscles  Cardiovascular: at rest no dyspnea, well perfused  Skin: no visible rashes or lesions  Neurologic: grossly intact, oriented to person, place, and time  Psychiatric: mood and affect appropriate  Musculoskeletal: in chair for appt. no difficulty w upper body movement    Assessment/Plan   Problem List Items Addressed This Visit    None  Visit Diagnoses         Recurrent UTI    -  Primary    Relevant Orders    Post-Void Residual    POCT UA  Automated manually resulted      Urge incontinence        Relevant Orders    Post-Void Residual    POCT UA Automated manually resulted            Orders Placed This Encounter   Procedures    Post-Void Residual    POCT UA Automated manually resulted     Release result to [facility]:   Immediate [1]      Dmanose, vaginal estrogen, refill sent    On Myrbetriq 25 mg daily, feels elevated bp not related to Myrbetriq    Can see Dr. Gould talk about Botox, [phone], SWU if desires, doing ok now    Refer GI for constant diarrhea, hx colitis  Nystatin powder for under breasts, tummy    Follow up with primary care or ER regarding abdominal issues sooner, constant diarrhea    Nurse line [phone]  Follow up 6 mos OAB, UTIs         Janett Giles, APRN-CNP  Lab Results   Component Value Date    GLUCOSE 115 (H) 12/31/2024    CALCIUM 9.7 12/31/2024     12/31/2024    K 5.6 (H) 12/31/2024    CO2 23 12/31/2024     12/31/2024    BUN 40 (H) 12/31/2024    CREATININE 1.85 (H) 12/31/2024

## 2025-04-22 NOTE — PATIENT INSTRUCTIONS
Dmanose, vaginal estrogen    On Myrbetriq 25 mg daily, feels elevated bp not related to Myrbetriq    Can see Dr. Gould talk about Botox, [phone], SWU if desires, doing ok now    Refer GI for constant diarrhea, hx colitis  Nystatin powder for under breasts, tummy    Nurse line [phone]  Follow up 6 mos OAB, UTIs   patient was critically ill... Patient was critically ill with a high probability of imminent or life threatening deterioration.

## 2025-04-23 DIAGNOSIS — I25.10 CORONARY ARTERY DISEASE INVOLVING NATIVE CORONARY ARTERY, UNSPECIFIED WHETHER ANGINA PRESENT, UNSPECIFIED WHETHER NATIVE OR TRANSPLANTED HEART: ICD-10-CM

## 2025-04-23 LAB
6MAM UR QL SCN: NEGATIVE NG/ML
BUPRENORPHINE UR QL: NEGATIVE NG/ML
CODEINE UR QL CFM: NEGATIVE NG/ML
DRUG SCREEN COMMENT UR-IMP: ABNORMAL
FENTANYL UR QL SCN: NEGATIVE NG/ML
HYDROCODONE UR QL CFM: 290 NG/ML
HYDROMORPHONE UR QL CFM: NEGATIVE NG/ML
Lab: NEGATIVE NG/ML
Lab: NEGATIVE NG/ML
METHADONE UR QL SCN: NEGATIVE NG/ML
MORPHINE UR QL CFM: NEGATIVE NG/ML
NORHYDROCODONE UR QL CFM: 83 NG/ML
NOROXYCODONE UR QL CFM: NEGATIVE NG/ML
OPIATES UR QL SCN: POSITIVE NG/ML
OXYCODONE UR QL CFM: NEGATIVE NG/ML
OXYMORPHONE UR QL CFM: NEGATIVE NG/ML
QUEST NOTES AND COMMENTS: ABNORMAL
TAPENTADOL UR QL SCN: NEGATIVE NG/ML
TRAMADOL UR QL SCN: NEGATIVE NG/ML

## 2025-04-23 RX ORDER — EZETIMIBE 10 MG/1
10 TABLET ORAL DAILY
Qty: 90 TABLET | Refills: 3 | Status: SHIPPED | OUTPATIENT
Start: 2025-04-23

## 2025-05-06 ENCOUNTER — HOSPITAL ENCOUNTER (OUTPATIENT)
Dept: RADIOLOGY | Facility: CLINIC | Age: 70
Discharge: HOME | End: 2025-05-06
Payer: MEDICARE

## 2025-05-06 DIAGNOSIS — I71.41 PARARENAL ABDOMINAL AORTIC ANEURYSM (AAA) WITHOUT RUPTURE: ICD-10-CM

## 2025-05-06 DIAGNOSIS — Z12.31 ENCOUNTER FOR SCREENING MAMMOGRAM FOR MALIGNANT NEOPLASM OF BREAST: ICD-10-CM

## 2025-05-06 PROCEDURE — 77067 SCR MAMMO BI INCL CAD: CPT | Performed by: RADIOLOGY

## 2025-05-06 PROCEDURE — 77063 BREAST TOMOSYNTHESIS BI: CPT

## 2025-05-06 PROCEDURE — 74176 CT ABD & PELVIS W/O CONTRAST: CPT | Performed by: RADIOLOGY

## 2025-05-06 PROCEDURE — 74176 CT ABD & PELVIS W/O CONTRAST: CPT

## 2025-05-06 PROCEDURE — 77063 BREAST TOMOSYNTHESIS BI: CPT | Performed by: RADIOLOGY

## 2025-05-12 ENCOUNTER — OFFICE VISIT (OUTPATIENT)
Dept: VASCULAR SURGERY | Facility: HOSPITAL | Age: 70
End: 2025-05-12
Payer: MEDICARE

## 2025-05-12 VITALS
BODY MASS INDEX: 37.73 KG/M2 | OXYGEN SATURATION: 94 % | DIASTOLIC BLOOD PRESSURE: 89 MMHG | SYSTOLIC BLOOD PRESSURE: 158 MMHG | HEART RATE: 71 BPM | WEIGHT: 221 LBS | HEIGHT: 64 IN

## 2025-05-12 DIAGNOSIS — I71.42 JUXTARENAL ABDOMINAL AORTIC ANEURYSM (AAA) WITHOUT RUPTURE: Primary | ICD-10-CM

## 2025-05-12 PROCEDURE — 99214 OFFICE O/P EST MOD 30 MIN: CPT | Performed by: SURGERY

## 2025-05-12 PROCEDURE — 3079F DIAST BP 80-89 MM HG: CPT | Performed by: SURGERY

## 2025-05-12 PROCEDURE — 3008F BODY MASS INDEX DOCD: CPT | Performed by: SURGERY

## 2025-05-12 PROCEDURE — 1159F MED LIST DOCD IN RCRD: CPT | Performed by: SURGERY

## 2025-05-12 PROCEDURE — 4010F ACE/ARB THERAPY RXD/TAKEN: CPT | Performed by: SURGERY

## 2025-05-12 PROCEDURE — 3077F SYST BP >= 140 MM HG: CPT | Performed by: SURGERY

## 2025-05-12 NOTE — PROGRESS NOTES
Vascular Surgery Consult/Clinic Note    CC: Follow up    HPI:  Amanda De Jesus is 69 y.o. female with history of CAD (hx of PCI for MI), HTN, HLD and single right kidney who referred for AAA. Patient reports she is trying to quit tobacco use. She is here for a follow up visit for her juxta-renal AAA. She denies any abdominal pain. She has chronic back pain but denies any changes in severity or quality.   Pt reports her kidney function has been worsening and seeing [facility] nephrologist.     Meds:   Medications Ordered Prior to Encounter[1]     Allergies:   RX Allergies[2]    SH:    Social Drivers of Health     Tobacco Use: High Risk (4/24/2025)    Received from [facility]    Patient History     Smoking Tobacco Use: Every Day     Smokeless Tobacco Use: Never     Passive Exposure: Not on file   Alcohol Use: Not At Risk (3/12/2025)    Received from [facility]    AUDIT-C     Frequency of Alcohol Consumption: Never     Average Number of Drinks: Patient declined     Frequency of Binge Drinking: Patient declined   Financial Resource Strain: Medium Risk (3/12/2025)    Received from [facility]    Overall Financial Resource Strain (CARDIA)     Difficulty of Paying Living Expenses: Somewhat hard   Food Insecurity: Food Insecurity Present (3/12/2025)    Received from [facility]    Hunger Vital Sign     Worried About Running Out of Food in the Last Year: Sometimes true     Ran Out of Food in the Last Year: Never true   Transportation Needs: No Transportation Needs (3/12/2025)    Received from [facility]    PRAPARE - Transportation     Lack of Transportation (Medical): No     Lack of Transportation (Non-Medical): No   Physical Activity: Unknown (10/25/2023)    Received from [facility]    Exercise Vital Sign     Days of Exercise per Week: 1 day     Minutes of Exercise per Session: Patient declined   Stress: No Stress Concern Present (10/25/2023)    Received from [facility]  Occupational Health - Occupational Stress Questionnaire     Feeling of Stress : Not at all   Social Connections: Unknown (3/12/2025)    [facility]    Social Connection and Isolation Panel [NHANES]     Frequency of Communication with Friends and Family: Not on file     Frequency of Social Gatherings with Friends and Family: Not on file     Attends Rastafari Services: Not on file     Active Member of Clubs or Organizations: Yes     Attends Club or Organization Meetings: Patient declined     Marital Status:    Intimate Partner Violence: Not At Risk (10/25/2023)    Received from [facility]    Humiliation, Afraid, Rape, and Kick questionnaire     Fear of Current or Ex-Partner: No     Emotionally Abused: No     Physically Abused: No     Sexually Abused: No   Depression: At risk (4/24/2025)    Received from [facility]    PHQ-2     Patient Health Questionnaire-2 Score: 5   Housing Stability: Unknown (3/12/2025)    Received from [facility]    Housing Stability Vital Sign     Unable to Pay for Housing in the Last Year: No     Number of Times Moved in the Last Year: Not on file     Homeless in the Last Year: No   Utilities: Not on file   Digital Equity: Not on file   Health Literacy: Adequate Health Literacy (3/12/2025)    Received from [facility]     Health Literacy     Frequency of need for help with medical instructions: Rarely        FH:  Family History[3]       Objective:  Vitals:  Vitals:    05/12/25 1111   BP: 158/89   Pulse:    SpO2:         Exam:  Gen: in NAD  GI: Soft, ND/NT  Ext:  BLE with 5/5 motor str. No tissue loss.     Imaging:  CT abd/pelv non con (11/5/2024) independently reviewed.   Juxta-renal AAA approx. 50 mm when measured using SVS measuring guideline.   Chronic LRA occlusion    CT abd/pelv non con (11/5/2024) independently reviewed.   Juxta-renal AAA approx. 49 mm when measured using SVS measuring guideline.   Chronic LRA occlusion     CTA abd/pelv  (4/16/2024) independently reviewed.   Juxta-renal AAA approx. 47 mm x 46 mm when measured using SVS measuring guideline.   Chronic LRA occlusion     CTA abd/pelv (11/2/2023) independently reviewed.   Juxta-renal AAA approx. 47 mm x 46 mm when measured using SVS measuring guideline.        Assessment & Plan:  Amanda De Jesus is 70 y.o. female Juxta-renal AAA approx. 49 mm.    - Currently using tobacco with high risk for open surgery with solitary kidney.    - Discussed signs and sx. Of rupture. In such case, patient was instructed to present to the nearest ER. Patient verbalized her understanding.    - Pt will see her nephrologist in 5 weeks for worsening renal function. She will discuss her need of CTA abd/pelv for surgical planing. She will return to clinic after nephrology visit.       Raciel Hernandez MD, PhD  Clinical   [facility] [employer]  Co-Director, Aortic Center  [facility] [place]               [1]   Current Outpatient Medications on File Prior to Visit   Medication Sig Dispense Refill    amLODIPine (Norvasc) 5 mg tablet Take 1 tablet (5 mg) by mouth once daily in the morning. Take before meals.      ascorbic acid, vitamin C, 500 mg capsule Take by mouth. Take as directed      aspirin 81 mg EC tablet Take 1 tablet (81 mg) by mouth once daily.      atorvastatin (Lipitor) 80 mg tablet Take 1 tablet (80 mg) by mouth once daily.      cyanocobalamin (Vitamin B-12) 1,000 mcg tablet Take by mouth once daily.      d-mannose 500 mg capsule Take 3 capsules (1,500 mg) by mouth 2 times a day.      ezetimibe (Zetia) 10 mg tablet TAKE ONE TABLET BY MOUTH DAILY 90 tablet 3    famotidine-Ca carb-mag hydrox (Pepcid Complete) -165 mg chewable tablet Chew 1 tablet every 12 hours if needed.      furosemide (Lasix) 40 mg tablet Take 1 tablet (40 mg) by mouth once daily. (Patient taking differently: Take 0.5 tablets (20 mg) by mouth once  daily.) 90 tablet 3    gabapentin (Neurontin) 300 mg capsule Take 1 capsule (300 mg) by mouth 2 times a day. (Patient taking differently: Take 1 capsule (300 mg) by mouth 1 time.) 60 capsule 0    HYDROcodone-acetaminophen (Norco) 5-325 mg tablet Take 1 tablet by mouth every 12 hours if needed for severe pain (7 - 10) for up to 28 days. Do not fill before April 24, 2025. 56 tablet 0    losartan (Cozaar) 100 mg tablet Take 1 tablet (100 mg) by mouth once daily in the morning. Take before meals.      metFORMIN  mg 24 hr tablet Take 1 tablet (500 mg) by mouth once daily in the evening.      metoprolol succinate XL (Toprol-XL) 100 mg 24 hr tablet Take 1 tablet (100 mg) by mouth once daily.      mirabegron (Myrbetriq) 25 mg tablet extended release 24 hr Take 1 tablet (25 mg) by mouth once daily. 30 tablet 11    multivitamin tablet Take 1 tablet by mouth once daily.      naloxone (Narcan) 4 mg/0.1 mL nasal spray Administer 1 spray (4 mg) into affected nostril(s) if needed for opioid reversal for up to 2 doses. May repeat every 2-3 minutes if needed, alternating nostrils, until medical assistance becomes available. 2 each 0    nystatin (Mycostatin) 100,000 unit/gram powder Apply 1 Application topically 2 times a day. Twice daily x 2 weeks then 2-3 x per week as needed for yeast under skin folds 60 g 2    zinc gluconate 100 mg tablet Take by mouth once daily. Zinc 100 MG Oral Tablet; TAKE AS DIRECTED.      estradiol (Estrace) 0.01 % (0.1 mg/gram) vaginal cream Apply pea size amount 0.5 gram to vaginal opening with finger daily for 2 weeks, then 2-3 times per week. (Patient not taking: Reported on 5/12/2025) 42.5 g 5    potassium chloride CR 10 mEq ER tablet Take 1 tablet (10 mEq) by mouth once daily. (Patient not taking: Reported on 5/12/2025)       No current facility-administered medications on file prior to visit.   [2]   Allergies  Allergen Reactions    Lansoprazole Anaphylaxis and Swelling    Semaglutide Nausea  Only    Cefprozil Rash and Swelling   [3]   Family History  Problem Relation Name Age of Onset    Breast cancer Mother Maci Barrera     Other (Varicose veins) Mother Maci Barrera     Heart disease Father Tj Barrera     Kidney failure Father Tj Barrera     Diabetes type II Father Tj Barrera     Kidney disease Father Tj Barrera     Diabetes Father Tj Barrera     Hypertension Father Tj Barrera     Pancreatic cancer Other sibling     Cancer Brother Alvin Barrera     Breast cancer Brother Alvin Barrera     Cancer Brother Alvin Barrera     Breast cancer Brother Alvin Barrera

## 2025-05-13 ENCOUNTER — APPOINTMENT (OUTPATIENT)
Dept: PAIN MEDICINE | Facility: CLINIC | Age: 70
End: 2025-05-13
Payer: MEDICARE

## 2025-05-13 VITALS
HEART RATE: 70 BPM | OXYGEN SATURATION: 94 % | BODY MASS INDEX: 37.73 KG/M2 | SYSTOLIC BLOOD PRESSURE: 158 MMHG | DIASTOLIC BLOOD PRESSURE: 89 MMHG | WEIGHT: 221 LBS | HEIGHT: 64 IN

## 2025-05-13 DIAGNOSIS — M51.26 HERNIATED NUCLEUS PULPOSUS, L4-5: ICD-10-CM

## 2025-05-13 DIAGNOSIS — R60.9 EDEMA: ICD-10-CM

## 2025-05-13 DIAGNOSIS — Z79.891 LONG TERM (CURRENT) USE OF OPIATE ANALGESIC: ICD-10-CM

## 2025-05-13 DIAGNOSIS — M54.16 RIGHT LUMBAR RADICULOPATHY: ICD-10-CM

## 2025-05-13 DIAGNOSIS — M51.27 OTHER INTERVERTEBRAL DISC DISPLACEMENT, LUMBOSACRAL REGION: ICD-10-CM

## 2025-05-13 DIAGNOSIS — G89.29 CHRONIC RADICULAR LUMBAR PAIN: ICD-10-CM

## 2025-05-13 DIAGNOSIS — M54.16 CHRONIC RADICULAR LUMBAR PAIN: ICD-10-CM

## 2025-05-13 DIAGNOSIS — M51.369 DEGENERATIVE DISC DISEASE, LUMBAR: ICD-10-CM

## 2025-05-13 PROCEDURE — 99214 OFFICE O/P EST MOD 30 MIN: CPT | Performed by: PHYSICAL MEDICINE & REHABILITATION

## 2025-05-13 PROCEDURE — 3008F BODY MASS INDEX DOCD: CPT | Performed by: PHYSICAL MEDICINE & REHABILITATION

## 2025-05-13 PROCEDURE — 3079F DIAST BP 80-89 MM HG: CPT | Performed by: PHYSICAL MEDICINE & REHABILITATION

## 2025-05-13 PROCEDURE — 3077F SYST BP >= 140 MM HG: CPT | Performed by: PHYSICAL MEDICINE & REHABILITATION

## 2025-05-13 PROCEDURE — 1160F RVW MEDS BY RX/DR IN RCRD: CPT | Performed by: PHYSICAL MEDICINE & REHABILITATION

## 2025-05-13 PROCEDURE — 4010F ACE/ARB THERAPY RXD/TAKEN: CPT | Performed by: PHYSICAL MEDICINE & REHABILITATION

## 2025-05-13 PROCEDURE — 1159F MED LIST DOCD IN RCRD: CPT | Performed by: PHYSICAL MEDICINE & REHABILITATION

## 2025-05-13 PROCEDURE — G2211 COMPLEX E/M VISIT ADD ON: HCPCS | Performed by: PHYSICAL MEDICINE & REHABILITATION

## 2025-05-13 RX ORDER — HYDROCODONE BITARTRATE AND ACETAMINOPHEN 5; 325 MG/1; MG/1
1 TABLET ORAL EVERY 12 HOURS PRN
Qty: 56 TABLET | Refills: 0 | Status: SHIPPED | OUTPATIENT
Start: 2025-05-30 | End: 2025-06-27

## 2025-05-13 RX ORDER — FUROSEMIDE 40 MG/1
40 TABLET ORAL DAILY
Qty: 1 TABLET | Refills: 0 | Status: SHIPPED | OUTPATIENT
Start: 2025-05-13 | End: 2025-05-16

## 2025-05-13 RX ORDER — HYDROCODONE BITARTRATE AND ACETAMINOPHEN 5; 325 MG/1; MG/1
1 TABLET ORAL EVERY 12 HOURS PRN
Qty: 56 TABLET | Refills: 0 | Status: SHIPPED | OUTPATIENT
Start: 2025-06-27 | End: 2025-07-25

## 2025-05-13 ASSESSMENT — ENCOUNTER SYMPTOMS
LOSS OF SENSATION IN FEET: 0
DEPRESSION: 0
OCCASIONAL FEELINGS OF UNSTEADINESS: 0

## 2025-05-13 ASSESSMENT — ANXIETY QUESTIONNAIRES
2. NOT BEING ABLE TO STOP OR CONTROL WORRYING: NOT AT ALL
7. FEELING AFRAID AS IF SOMETHING AWFUL MIGHT HAPPEN: NOT AT ALL
3. WORRYING TOO MUCH ABOUT DIFFERENT THINGS: NOT AT ALL
4. TROUBLE RELAXING: NOT AT ALL
IF YOU CHECKED OFF ANY PROBLEMS ON THIS QUESTIONNAIRE, HOW DIFFICULT HAVE THESE PROBLEMS MADE IT FOR YOU TO DO YOUR WORK, TAKE CARE OF THINGS AT HOME, OR GET ALONG WITH OTHER PEOPLE: NOT DIFFICULT AT ALL
5. BEING SO RESTLESS THAT IT IS HARD TO SIT STILL: NOT AT ALL
GAD7 TOTAL SCORE: 0
1. FEELING NERVOUS, ANXIOUS, OR ON EDGE: NOT AT ALL
6. BECOMING EASILY ANNOYED OR IRRITABLE: NOT AT ALL

## 2025-05-13 ASSESSMENT — PATIENT HEALTH QUESTIONNAIRE - PHQ9
1. LITTLE INTEREST OR PLEASURE IN DOING THINGS: NOT AT ALL
2. FEELING DOWN, DEPRESSED OR HOPELESS: NOT AT ALL
SUM OF ALL RESPONSES TO PHQ9 QUESTIONS 1 AND 2: 0

## 2025-05-13 NOTE — PROGRESS NOTES
Chief complaint  Back and leg pain      Leatha BUSTAMANTE SHELLI,   was present during the entire history and physical examination    History  Amanda De Jesus is back for pain management office visit  She is depressed having end stage renal failure and needs and also needs cardiac surgery and open heart for aneurym and could not have done that intravascular and will need surgery  Following with renal and cardiology.   She is cut back on artemio to once a day and lasix to 20 mg a day per nephrology  Pain is controlled still on hydrocodone bid  Dw her about renal elimination of meds and needs to avoid that  At this time dw her abotu possible hemodialysis and then we will need to go back on artemio but she since she has renal failure I agree with cutting back on the dose of gabapentin    Amanda De Jesus was at home in [place].  Could not physically come to the office today .  I was at the office.  I used secure audiovisual communication provided by the Epic system. Amanda De Jesus  agreed on this form of communication and consented to the visit via A/V method.  The patient also understood that this will be billed as office visit.     Pain level without medication is 8/10 , with the medication pain level 2 to 3/10.     Pain disability index (PDI) improvement   across different functional categories, with the pain control with the meds. Forms filled by patient are scanned in the chart    The pain meds are helping control the pain and improving Activities of Daily living and quality of life and quality of sleep.    opioids treatment agreement Jan 2025    Pill count today, using count tray, and in front of patient, Nurse and myself :  33    pills , last fill was on 5/2  for 56 tabs,  the meds pill count today is correct  Oarrs pulled and reviewed, no concerns  last urine toxicology testing was compliant this was done on : April 2025  Xray updated spine   ORT (opioid risk tool) score is  0  Pain pathology and pain generators spine   Modalities  "tried injection, surgery, physical therapy, TENS unit, nonsteroidal anti-inflammatory medication       Review of Systems :  Denied any fever or chills. No weight loss and no night sweats. No cough or sputum production. No diarrhea   The constipation has been responding to fibers and over the counter medications.     No bladder and bowel incontinence and no other changes in bladder and bowel. No skin changes.  Reports tiredness and fatigability only if the pain is not controlled.     I further Asked about symptoms or changes affecting the vision, hearing, breathing, digestive system, urinary symptoms, skin, other musculoskeletal condition, neurological conditions these are negative except as detailed in the history and physical examination above    Denied opioids diversion and abuse and denies alcoholism. Denies overuse of the pain medications.  No reported euphoria sensation or getting a \"high\" on the pain medications.    The control of the pain with the pain medications is helping the control of the symptoms and allowing the function and activities of daily living, enjoyment of life, improving the quality of life and sleep with less interruption by the pain. The goal is symptomatic control of the nonmalignant chronic pain and not to repair the permanent damage in the tissues inducing the chronic pain conditions. We are aiming to shift the focus from the nonmalignant chronic pain to other aspects of life by symptomatically treating this chronic pain. If this pain is not treated it will lead to major morbidity and it is also associated with increased risks of mortality. The patient understands those very clearly and also understand high risks of morbidity and mortality if not strictly adherent to the treatment recommendations and reporting any associated side effects. Also patient understand the full responsibility associated with these medications to avoid abuse or overuse or any use of these medications for " anything besides treating the patient's own chronic pain and nothing else under any circumstances.        Physical examination  Awake, alert and oriented for time place and persons   Pupils are equal and reactive to light and accommodation    SLR increased pain in the back and leg  No aberrant pain behavior   plantar cutaneous reflex are down going bilaterally     Diagnosis  Problem List Items Addressed This Visit       Degenerative disc disease, lumbar    Relevant Medications    HYDROcodone-acetaminophen (Norco) 5-325 mg tablet (Start on 5/30/2025)    HYDROcodone-acetaminophen (Norco) 5-325 mg tablet (Start on 6/27/2025)    Edema    Relevant Medications    furosemide (Lasix) 40 mg tablet    Herniated nucleus pulposus, L4-5    Relevant Medications    HYDROcodone-acetaminophen (Norco) 5-325 mg tablet (Start on 5/30/2025)    HYDROcodone-acetaminophen (Norco) 5-325 mg tablet (Start on 6/27/2025)    Other intervertebral disc displacement, lumbosacral region    Relevant Medications    HYDROcodone-acetaminophen (Norco) 5-325 mg tablet (Start on 5/30/2025)    HYDROcodone-acetaminophen (Norco) 5-325 mg tablet (Start on 6/27/2025)    Right lumbar radiculopathy    Relevant Medications    HYDROcodone-acetaminophen (Norco) 5-325 mg tablet (Start on 5/30/2025)    HYDROcodone-acetaminophen (Norco) 5-325 mg tablet (Start on 6/27/2025)    Chronic radicular lumbar pain    Relevant Medications    HYDROcodone-acetaminophen (Norco) 5-325 mg tablet (Start on 5/30/2025)    HYDROcodone-acetaminophen (Norco) 5-325 mg tablet (Start on 6/27/2025)    Long term (current) use of opiate analgesic    Relevant Medications    HYDROcodone-acetaminophen (Norco) 5-325 mg tablet (Start on 5/30/2025)    HYDROcodone-acetaminophen (Norco) 5-325 mg tablet (Start on 6/27/2025)        Plan  Reviewed the pain generators.  Went over the types of pain with neuropathic and nociceptive and different pathologies and therapeutic modalities. Discussed the mechanism  of action of interventions from acupuncture, physical therapy , regular exercises, injections, botox, spinal cord stimulation, and role of surgery     Went over pathology of the intervertebral disc displacement and the anatomical relation to the Nerve roots and relation to the radicular symptoms. Went over treatment modalities with conservative treatment including acupuncture   and epidural steroid injection with fluoroscopy guidance and last resort of surgery    Based on the above findings and the clinical response to the opioids medications and improvement of the activities of daily living, sleep, and work performance. We made this complex decision to continue the opioids therapy in light of the evidence of the patient's responsibility in using the pain medications as prescribed for the nonmalignant chronic pain condition. We discussed about the use of the pain medications to treat the symptoms of chronic nonmalignant pain and we are not trying the repair the permanent damage in the tissues, rather we are trying to control the symptoms induced by the permanent damage to the tissues inducing the chronic pain condition and resulting disability. I explained the difference and discussed it with the patient and stressed the importance of knowing the difference especially because of the potential side effects and the potential addicting effect and habit forming nature of the dangerous drugs we are using to treat the symptoms of the chronic pain.      We discussed that we are prescribing the medications on good manas and legitimate medical reason within the scope of professional medical practice.     We reviewed the side effects and precautions of opioids prescriptions as discussed in the opioids treatment agreement.    realizes the interaction between the therapeutic classes including the respiratory depression and potential death     Random drug testing   we will submit     Will cut back on meds artemio due to renal  failure  Dw her about cognitive changes with opioids and consider cutting back on that too  Dw her about post op pain    Discussed about NSAIDS and I explained about the opioids sparing effect to allow keeping the opioids dose at minimal effective dose.   I went over the potential side effects of the NSAIDS on the gastrointestinal, renal and cardiovascular systems.      I detailed the side effects from the acetaminophen in the medication and made aware of those. I also explained about the cumulative effects on the organs and mainly the liver.     Given the opioids therapy , we discussed about the risk for accidental over dose on the pain medications, either for patient or other household. I went over the mechanism of action and mode of use of the Naloxone according to the  recommendations. I will provide a prescription for a kit.     Follow-up 8 weeks or earlier if needed     The level of clinical decision making in this office visit,  is high, given the high risks of complications with the morbidity and mortality due to the fact that acute and chronic pain may pose a threat to life and bodily function, if under treated, poorly treated, or with failure to maintain adequate treatment and timely medical follow up. Additionally over treatment has its own set of complications including overdosing on the pain medications and also the habit forming potentials with the use of the medications used to treat chronic painful conditions including therapeutic classes classified as dangerous medications. Given the serious and fluctuating nature of pain level and instensity with extensive consideration for whenever pain changes, there is always the risk of prolonged functional impairment requiring close patient monitoring with regular assessments and reassessments and high level medical decision making at every office visit. The amount and complexity of data reviewed is high given the patient clinical presentation,  labs,  data, radiology reports, and other tests as discussed during office visits. Pertinent data whether positive or negative were taken in consideration in the process of making this high level medical decision.

## 2025-05-14 ENCOUNTER — OFFICE VISIT (OUTPATIENT)
Dept: PULMONOLOGY | Facility: CLINIC | Age: 70
End: 2025-05-14
Payer: MEDICARE

## 2025-05-14 VITALS
WEIGHT: 217 LBS | HEIGHT: 64 IN | BODY MASS INDEX: 37.05 KG/M2 | TEMPERATURE: 97.6 F | OXYGEN SATURATION: 98 % | RESPIRATION RATE: 18 BRPM | DIASTOLIC BLOOD PRESSURE: 101 MMHG | HEART RATE: 65 BPM | SYSTOLIC BLOOD PRESSURE: 137 MMHG

## 2025-05-14 DIAGNOSIS — R06.00 DYSPNEA, UNSPECIFIED TYPE: ICD-10-CM

## 2025-05-14 DIAGNOSIS — Z78.9 NEED FOR NICOTINE REPLACEMENT THERAPY: Primary | ICD-10-CM

## 2025-05-14 PROCEDURE — 1159F MED LIST DOCD IN RCRD: CPT | Performed by: STUDENT IN AN ORGANIZED HEALTH CARE EDUCATION/TRAINING PROGRAM

## 2025-05-14 PROCEDURE — 3075F SYST BP GE 130 - 139MM HG: CPT | Performed by: STUDENT IN AN ORGANIZED HEALTH CARE EDUCATION/TRAINING PROGRAM

## 2025-05-14 PROCEDURE — 99204 OFFICE O/P NEW MOD 45 MIN: CPT | Performed by: STUDENT IN AN ORGANIZED HEALTH CARE EDUCATION/TRAINING PROGRAM

## 2025-05-14 PROCEDURE — 99214 OFFICE O/P EST MOD 30 MIN: CPT | Performed by: STUDENT IN AN ORGANIZED HEALTH CARE EDUCATION/TRAINING PROGRAM

## 2025-05-14 PROCEDURE — 4010F ACE/ARB THERAPY RXD/TAKEN: CPT | Performed by: STUDENT IN AN ORGANIZED HEALTH CARE EDUCATION/TRAINING PROGRAM

## 2025-05-14 PROCEDURE — 3008F BODY MASS INDEX DOCD: CPT | Performed by: STUDENT IN AN ORGANIZED HEALTH CARE EDUCATION/TRAINING PROGRAM

## 2025-05-14 PROCEDURE — 3080F DIAST BP >= 90 MM HG: CPT | Performed by: STUDENT IN AN ORGANIZED HEALTH CARE EDUCATION/TRAINING PROGRAM

## 2025-05-14 RX ORDER — ALBUTEROL SULFATE 90 UG/1
1 INHALANT RESPIRATORY (INHALATION) ONCE
OUTPATIENT
Start: 2025-05-14

## 2025-05-14 RX ORDER — IBUPROFEN 200 MG
1 TABLET ORAL EVERY 24 HOURS
Qty: 30 PATCH | Refills: 0 | Status: SHIPPED | OUTPATIENT
Start: 2025-05-14 | End: 2025-06-13

## 2025-05-14 RX ORDER — ALBUTEROL SULFATE 0.83 MG/ML
3 SOLUTION RESPIRATORY (INHALATION) ONCE
OUTPATIENT
Start: 2025-05-14 | End: 2025-05-14

## 2025-05-14 ASSESSMENT — COPD QUESTIONNAIRES
QUESTION4_WALKINCLINE: 4
QUESTION2_CHESTPHLEGM: 2
CAT_TOTALSCORE: 15
QUESTION8_ENERGYLEVEL: 3
QUESTION3_CHESTTIGHTNESS: 0 - MY CHEST DOES NOT FEEL TIGHT AT ALL
QUESTION5_HOMEACTIVITIES: 3
QUESTION6_LEAVINGHOUSE: 0 - I AM CONFIDENT LEAVING MY HOME DESPITE MY LUNG CONDITION
QUESTION7_SLEEPQUALITY: 0 - I SLEEP SOUNDLY
QUESTION1_COUGHFREQUENCY: 3

## 2025-05-14 ASSESSMENT — ENCOUNTER SYMPTOMS
ABDOMINAL PAIN: 0
ABDOMINAL DISTENTION: 0
PALPITATIONS: 0
CHILLS: 0
FEVER: 0
UNEXPECTED WEIGHT CHANGE: 0
ARTHRALGIAS: 0

## 2025-05-14 NOTE — PROGRESS NOTES
Department of Medicine I Division of Pulmonary, Critical Care, and Sleep Medicine   Phone: [phone]  Fax: [phone]    History of Present Illness   Amanda De Jesus is a 70 y.o. female presenting for pulmonary consultation.    Referred by:  Dr. Danielle for dyspnea. I have independently interviewed and examined the patient in the office and reviewed available records.     Reports of hemoptysis to pcp in November--CT chest done showing infiltrate and bronchiectasis--was prescribed augmentin   Endorsed long coughing fits to the point she was having hemoptysis (this resolved after 2.5 days)   Is planning for surgery for her  AAA --follows with Dr. Hernandez   Also following with nephrologist for CKD IV--in discussion of needing dialysis --apptmt next week   She is a smoker and has emphysema   Does have  BL LE edema which is improved     MMRC 2    Occasional Wheezing   Deals with allergic rhinitis   Daily cough---worse at night  Does have to clear out lungs in the morning   PCP had prescribed trelegy back in November which she didn't note much of a difference --she didn't continue it   Denies any history of copd or asthma but has seasonal allergies    Review of Systems  Review of Systems   Constitutional:  Negative for chills, fever and unexpected weight change.   Respiratory:          As per hpi   Cardiovascular:  Negative for chest pain, palpitations and leg swelling.   Gastrointestinal:  Negative for abdominal distention and abdominal pain.   Musculoskeletal:  Negative for arthralgias and gait problem.   Skin:  Negative for rash.     All other review of systems are negative and/or non-contributory.    Past Medical History   She has a past medical history of Allergic, Arthritis, Chronic kidney disease, Diabetes mellitus (Multi), Heart disease, Heart disease, unspecified, Hypertension, Other intervertebral disc displacement, lumbar region (09/28/2016), Personal history of other diseases of the circulatory system  (02/01/2019), Personal history of other diseases of the circulatory system, Personal history of other diseases of the respiratory system, Personal history of other endocrine, nutritional and metabolic disease (01/25/2019), Personal history of other endocrine, nutritional and metabolic disease, Personal history of other infectious and parasitic diseases (05/10/2019), and Urinary tract infection (2020).    Immunizations     Immunization History   Administered Date(s) Administered    COVID-19, mRNA, LNP-S, PF, 30 mcg/0.3 mL dose 03/26/2021, 04/14/2021    Flu vaccine (IIV4), preservative free *Check age/dose* 09/19/2016, 10/24/2017, 02/15/2019    Flu vaccine, quadrivalent, high-dose, preservative free, age 65y+ (FLUZONE) 11/19/2020    Flu vaccine, trivalent, preservative free, HIGH-DOSE, age 65y+ (Fluzone) 10/07/2021, 11/20/2024    Flu vaccine, trivalent, preservative free, age 6 months and greater (Fluarix/Fluzone/Flulaval) 11/03/2012, 10/26/2013    Influenza, seasonal, injectable 02/13/2019    Pneumococcal conjugate vaccine, 13-valent (PREVNAR 13) 10/07/2021    Pneumococcal conjugate vaccine, 20-valent (PREVNAR 20) 11/20/2024    Pneumococcal polysaccharide vaccine, 23-valent, age 2 years and older (PNEUMOVAX 23) 08/22/2019       Medications and Allergies     Current Outpatient Medications   Medication Instructions    amLODIPine (NORVASC) 5 mg, Daily before breakfast    ascorbic acid, vitamin C, 500 mg capsule Take by mouth. Take as directed    aspirin 81 mg EC tablet 1 tablet, Daily    atorvastatin (Lipitor) 80 mg tablet 1 tablet, Daily    cyanocobalamin (Vitamin B-12) 1,000 mcg tablet Daily    d-mannose 1,500 mg, 2 times daily    ezetimibe (ZETIA) 10 mg, oral, Daily    famotidine-Ca carb-mag hydrox (Pepcid Complete) -165 mg chewable tablet 1 tablet, Every 12 hours PRN    furosemide (LASIX) 40 mg, oral, Daily, 1/2 tab a day per nephrology    gabapentin (NEURONTIN) 300 mg, oral, 2 times daily    [START ON  5/30/2025] HYDROcodone-acetaminophen (Norco) 5-325 mg tablet 1 tablet, oral, Every 12 hours PRN    [START ON 6/27/2025] HYDROcodone-acetaminophen (Norco) 5-325 mg tablet 1 tablet, oral, Every 12 hours PRN    losartan (COZAAR) 100 mg, Daily before breakfast    metFORMIN XR (GLUCOPHAGE-XR) 500 mg, Every evening    metoprolol succinate XL (TOPROL-XL) 100 mg, Daily    multivitamin tablet 1 tablet, Daily    Myrbetriq 25 mg, oral, Daily    naloxone (NARCAN) 4 mg, nasal, As needed, May repeat every 2-3 minutes if needed, alternating nostrils, until medical assistance becomes available.    nystatin (Mycostatin) 100,000 unit/gram powder 1 Application, Topical, 2 times daily, Twice daily x 2 weeks then 2-3 x per week as needed for yeast under skin folds    potassium chloride CR 10 mEq ER tablet 10 mEq, Daily    zinc gluconate 100 mg tablet Daily      Lansoprazole, Semaglutide, and Cefprozil    Social History   She reports that she has been smoking cigarettes. She has never used smokeless tobacco. She reports that she does not drink alcohol and does not use drugs.    Smoking History: 0.5 pack a day- 55 years total; did use chantix in the past (got insominia from it), tried patches.   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)    Family History   Family History[1]  Mother: breast cancer         Surgical History   She has a past surgical history that includes Cholecystectomy (09/26/2013); Other surgical history (05/10/2019); Carpal tunnel release (08/10/2015); Other surgical history (03/01/2019); Other surgical history (01/15/2019); Other surgical history (01/15/2019); Knee arthroscopy w/ debridement (09/29/2016); CT angio abdomen w and or wo IV IV contrast (10/04/2022); CT angio abdomen pelvis w and or wo IV IV contrast (11/02/2023); Joint replacement (Left); Ventriculoperitoneal shunt; Tubal ligation; and Coronary stent placement.    Physical Exam     Vitals:    05/14/25 1004  "  BP: (!) 137/101   Pulse: 65   Resp: 18   Temp: 36.4 °C (97.6 °F)   SpO2: 98%       Physical Exam  Vitals reviewed.   Constitutional:       General: She is awake.   Cardiovascular:      Rate and Rhythm: Normal rate and regular rhythm.   Pulmonary:      Effort: Pulmonary effort is normal.      Breath sounds: Normal breath sounds.   Musculoskeletal:      Right lower leg: Edema present.      Left lower leg: Edema present.   Neurological:      Mental Status: She is alert and oriented to person, place, and time.   Psychiatric:         Attention and Perception: Attention and perception normal.         Behavior: Behavior normal.          Results   Pulmonary Function Tests:  Failed to redirect to the Timeline version of the tab ticketbroker SmartLink.    No results found for: \"FEV1\", \"FUY6EYVX\"    Chest Radiograph:  XR chest 2 views 11/20/2024    Narrative  TITLE OF EXAM:   XR CHEST 2 VIEWS    REASON FOR EXAM:  \"Coughing up blood\"    TECHNIQUE: 2 radiographs of the chest.    COMPARISON: None    FINDINGS:  Hyperexpansion of lungs with flattening of the hemidiaphragms. Diffuse small airway thickening/prominence. No consolidation. No appreciable nodule/mass. Diffuse reticular opacities. No significant effusion or pneumothorax.  The cardiomediastinal silhouette is normal.  No acute osseous or upper abdominal abnormality.    IMPRESSION:  1. No acute appearing cardiopulmonary abnormality.  2. Chronic appearing small airways disease and fibrotic changes.  3. COPD versus significant inspiratory effort at the time of image acquisition.    DICTATED ON: 11/20/2024 10:26 AM    This report has been electronically signed in approved by the interpreting radiologist.      Chest CT Scan:  CT chest 11/24 (report only)   FINDINGS:   LUNGS and AIRWAYS:   Mild centrilobular and paraseptal emphysema, upper lobe dominant   similar to the prior exam. Cylindrical and saccular bronchiectasis is   present in the upper lobes greater on left, also as previously. " "A   micronodule in the left lower lung on image 187 of series 202, and 4   mm right lung nodule on image 107 are stable, considered benign.   There is a small patchy area opacity at the central aspect of the   middle lobe, probably due to early pneumonic infiltrate. No   additional infiltrate or effusion.       MEDIASTINUM and GEETA, LOWER NECK AND AXILLA:   The visualized thyroid gland is within normal limits.       No evidence of thoracic lymphadenopathy by CT criteria.       HEART and VESSELS:   The thoracic aorta is of normal course and caliber , but with mild   atherosclerotic calcification .       Main pulmonary artery and its branches are normal in caliber.       There is fairly extensive coronary artery atherosclerotic   calcification primarily involving the LAD. This is similar to the   prior exam.       The cardiac chambers are not enlarged.       UPPER ABDOMEN:   Marked left renal cortical atrophy.   Aneurysmal dilatation of the visualized upper abdominal aorta   measuring at least 5 cm, with atherosclerotic calcification and   intraluminal thrombus. This appears similar to the previous exam.       CHEST WALL, OSSEOUS STRUCTURES AND OTHER FINDINGS:   There are no suspicious osseous lesions.       IMPRESSION:   1. Probable early pneumonic infiltrate at the middle lobe.   2. Additional stable nonacute findings as above.        ECHO:  No results found for this or any previous visit from the past 365 days.       Labs:  Lab Results   Component Value Date    WBC 5.9 02/27/2024    HGB 15.2 02/27/2024    HCT 46.0 02/27/2024    MCV 96 02/27/2024     02/27/2024       Lab Results   Component Value Date    CREATININE 1.85 (H) 12/31/2024    BUN 40 (H) 12/31/2024     12/31/2024    K 5.6 (H) 12/31/2024     12/31/2024    CO2 23 12/31/2024        No results found for: \"DAVIE\", \"RF\", \"SEDRATE\"     IgE: No results found for: \"IGE\"   Respiratory Allergy Panel:  AEC:   Eosinophils Absolute (x10*3/uL)   Date " "Value   02/27/2024 0.23     Eosinophils % (%)   Date Value   02/27/2024 3.9       Cultures:  No results found for: \"AFBCX\"   No results found for: \"RESPCULTCYFI\"    No results found for the last 90 days.  C     Assessment and Plan       Amanda De Jesus is a 70 y.o. year old female patient with AAA, CKD IV HTN.HLD here for pulmonary evalution given episode of hemoptysis and findings on CT scan in November--likely related to infection at the time which was treated with abx.  The coughing fits and hemoptysis has resolved.  She is being evaluated for surgery for her AAA and also following with nephrology regarding her CKD.  She does have some mild dyspnea with activity.  Likely has some component of copd given emphysema on scan.  She is still smoking and is interested in quitting     Recommendations  -PFT and walk test  -nicotine patch for smoking cessation--we discussed restarting chantix she has a script at home--will check expiration date  and let me know I can send a new one in   -annual LDCT chest for lung cancer screening given smoking history-->due in November 2025     Return in July       Irais Oneal MD  05/14/2025         [1]   Family History  Problem Relation Name Age of Onset    Breast cancer Mother Maci Tafoyae     Other (Varicose veins) Mother Maci Holly     Heart disease Father Tj Holly     Kidney failure Father Tj Holly     Diabetes type II Father Tj Holly     Kidney disease Father Tj Tafoyae     Diabetes Father Tj Tafyoae     Hypertension Father Tj Holly     Pancreatic cancer Other sibling     Cancer Brother Alvin Barrera     Breast cancer Brother Alvin Barrera     Cancer Brother Alvin Barrera     Breast cancer Brother Alvin Barrera      "

## 2025-05-14 NOTE — PATIENT INSTRUCTIONS
Thank you for visiting the Pulmonary Clinic today.   Your breathing medications: nicotine patch --check expiration date on chantix if  I will send new script  Tests: pulmonary function test   For resources to help quit smoking you can visit the [facility] website at [email or web address]  or call [phone] QUIT-NOW ([phone])   Return in July after your visit with Dr. Hernandez   If you have questions or concerns, call [phone] (option 4)

## 2025-05-16 ENCOUNTER — APPOINTMENT (OUTPATIENT)
Dept: PHARMACY | Facility: HOSPITAL | Age: 70
End: 2025-05-16
Payer: MEDICARE

## 2025-05-16 DIAGNOSIS — R60.9 EDEMA: ICD-10-CM

## 2025-05-16 DIAGNOSIS — N39.41 URGE INCONTINENCE: ICD-10-CM

## 2025-05-16 RX ORDER — VIBEGRON 75 MG/1
75 TABLET, FILM COATED ORAL DAILY
Qty: 30 TABLET | Refills: 2 | Status: SHIPPED | OUTPATIENT
Start: 2025-05-16

## 2025-05-16 NOTE — PROGRESS NOTES
Patient ID: Amanda De Jesus is a 70 y.o. female who presents for Urge incontinence.    Referring Provider: Janett Giles   Pt was referred for cost assistance     Preferred Pharmacy:    [id number] - [place], [place] - [address]  [address] [facility] [address]  Phone: [phone] Fax: [phone]    [facility] - [place], [place] - [address]  [address]  [address]  [facility] [address]  Phone: [phone] Fax: [phone]    [facility]  [address], [address]  [facility] [address]  Phone: [phone] Fax: [phone]      Copay assistance:   Do you have copays on your medications? Yes  Do you have trouble affording your current medications? Yes     Patient Assistance Screening (VAF)    Patient verbally reports monthly or yearly income which is less than 400% federal poverty level   Application for program has been submitted for the following medications:   Myrbetriq   Patient has been informed that program team will be reaching out to them to discuss necessary documentation, instructed to answer phone/return voicemail.   Patient aware this process may take up to 6 weeks.   If approved medication must be filled through [facility] pharmacy and may be picked up or mailed to patient.       Subjective      Patient reports she has been dealing with a lot recently - her son-in-law recently passed away, she has to move this summer, she has stage 4 kidney failure currently and has been following with nephrology closely (medications were adjusted to hopefully help improve kidney function), and she will need open heart surgery to repair her aortic aneurysm.      Vaginal Symptoms  Vaginal Dryness: None   Dysuria (pain, burning, stinging, or itching with urination): None (only had burning when had UTI)  Vaginal and/or vulvar irritation/itching: No   Recurrent urinary tract infections: yes     Urinary Symptoms  Medications that may  "contribute to symptoms:   Diuretics - discussed previously   Any history of:   Narrow-angle glaucoma? No   Impaired gastric emptying? No   Urinary retention? No     If diabetes, blood sugar controlled? HbA1c 6.4%  Frequently of bowel movement? Couple times a day   Tobacco use? Yes, smoke half a pack a day   How many protective undergarments are you utilizing daily? Panty liners     What medications have been tried/stopped?   Oxybutynin/trospium - didn't work and nausea   Current medication? Myrbetriq 50 mg daily - although had reduced her down to 25 mg daily due to kidney function she increased herself back up as she was starting to have accidents again and \"pee the bed\"  When started? Has been on Myrbetriq for almost a year, recently reduced to 25 mg but she self-increased back to 50 mg daily   Side effects? No  Improvement in symptoms? Yes - has had more accidents recently     Cardiovascular Health  The ASCVD Risk score (Lance ADAMS, et al., 2019) failed to calculate for the following reasons:    Risk score cannot be calculated because patient has a medical history suggesting prior/existing ASCVD    Lab Results   Component Value Date    CHOL 158 02/27/2024     Lab Results   Component Value Date    HDL 53.3 02/27/2024     Lab Results   Component Value Date    LDLCALC 62 02/27/2024     Lab Results   Component Value Date    TRIG 214 (H) 02/27/2024     No components found for: \"CHOLHDL\"        Blood Sugar Balance  Lab Results   Component Value Date    GLUCOSE 115 (H) 12/31/2024    HGBA1C 6.4 (H) 02/27/2024    HGBA1C 6.4 (H) 02/27/2024    HGBA1C 6.6 (H) 10/23/2023    HGBA1C 6.6 (H) 10/23/2023     No results found for: \"LEPTIN\", \"INSULFAST\", \"GLUF\"      Thyroid  Lab Results   Component Value Date    TSH 1.77 10/23/2023    FREET4 1.24 06/16/2021       Iron Status  No results found for: \"IRON\", \"TIBC\", \"FERRITIN\"     Kidney Function  Lab Results   Component Value Date    GFRF 54 (A) 09/30/2022    CREATININE 1.85 (H) " 12/31/2024       Potassium  Lab Results   Component Value Date    K 5.6 (H) 12/31/2024        Vitamin D3  Lab Results   Component Value Date    VITD25 39 08/05/2022       Current Outpatient Medications on File Prior to Visit   Medication Sig Dispense Refill    furosemide (Lasix) 40 mg tablet Take 1 tablet (40 mg) by mouth once daily for 1 day. 1/2 tab a day per nephrology (Patient taking differently: Take 0.5 tablets (20 mg) by mouth once daily. 1/2 tab a day per nephrology) 1 tablet 0    gabapentin (Neurontin) 300 mg capsule Take 1 capsule (300 mg) by mouth 2 times a day. (Patient taking differently: Take 1 capsule (300 mg) by mouth once daily.) 60 capsule 0    losartan (Cozaar) 100 mg tablet Take 1 tablet (100 mg) by mouth once daily in the morning. Take before meals. (Patient taking differently: Take 0.5 tablets (50 mg) by mouth once daily.)      amLODIPine (Norvasc) 5 mg tablet Take 1 tablet (5 mg) by mouth once daily in the morning. Take before meals.      ascorbic acid, vitamin C, 500 mg capsule Take by mouth. Take as directed      aspirin 81 mg EC tablet Take 1 tablet (81 mg) by mouth once daily.      atorvastatin (Lipitor) 80 mg tablet Take 1 tablet (80 mg) by mouth once daily.      cyanocobalamin (Vitamin B-12) 1,000 mcg tablet Take by mouth once daily.      d-mannose 500 mg capsule Take 3 capsules (1,500 mg) by mouth 2 times a day.      ezetimibe (Zetia) 10 mg tablet TAKE ONE TABLET BY MOUTH DAILY (Patient not taking: Reported on 5/14/2025) 90 tablet 3    famotidine-Ca carb-mag hydrox (Pepcid Complete) -165 mg chewable tablet Chew 1 tablet every 12 hours if needed.      [START ON 5/30/2025] HYDROcodone-acetaminophen (Norco) 5-325 mg tablet Take 1 tablet by mouth every 12 hours if needed for severe pain (7 - 10) for up to 28 days. Do not fill before May 30, 2025. 56 tablet 0    [START ON 6/27/2025] HYDROcodone-acetaminophen (Norco) 5-325 mg tablet Take 1 tablet by mouth every 12 hours if needed for  severe pain (7 - 10) for up to 28 days. Do not fill before June 27, 2025. 56 tablet 0    metFORMIN  mg 24 hr tablet Take 1 tablet (500 mg) by mouth once daily in the evening.      metoprolol succinate XL (Toprol-XL) 100 mg 24 hr tablet Take 1 tablet (100 mg) by mouth once daily.      multivitamin tablet Take 1 tablet by mouth once daily.      naloxone (Narcan) 4 mg/0.1 mL nasal spray Administer 1 spray (4 mg) into affected nostril(s) if needed for opioid reversal for up to 2 doses. May repeat every 2-3 minutes if needed, alternating nostrils, until medical assistance becomes available. 2 each 0    nicotine (Nicoderm CQ) 21 mg/24 hr patch Place 1 patch over 24 hours on the skin once every 24 hours. 30 patch 0    nystatin (Mycostatin) 100,000 unit/gram powder Apply 1 Application topically 2 times a day. Twice daily x 2 weeks then 2-3 x per week as needed for yeast under skin folds 60 g 2    potassium chloride CR 10 mEq ER tablet Take 1 tablet (10 mEq) by mouth once daily. (Patient not taking: Reported on 5/14/2025)      zinc gluconate 100 mg tablet Take by mouth once daily. Zinc 100 MG Oral Tablet; TAKE AS DIRECTED.      [DISCONTINUED] estradiol (Estrace) 0.01 % (0.1 mg/gram) vaginal cream Apply pea size amount 0.5 gram to vaginal opening with finger daily for 2 weeks, then 2-3 times per week. (Patient not taking: Reported on 5/12/2025) 42.5 g 5    [DISCONTINUED] furosemide (Lasix) 40 mg tablet Take 1 tablet (40 mg) by mouth once daily. (Patient taking differently: Take 0.5 tablets (20 mg) by mouth once daily.) 90 tablet 3    [DISCONTINUED] HYDROcodone-acetaminophen (Norco) 5-325 mg tablet Take 1 tablet by mouth every 12 hours if needed for severe pain (7 - 10) for up to 28 days. Do not fill before April 24, 2025. 56 tablet 0    [DISCONTINUED] mirabegron (Myrbetriq) 25 mg tablet extended release 24 hr Take 1 tablet (25 mg) by mouth once daily. 30 tablet 11     No current facility-administered medications on file  prior to visit.        Medication and allergy reconciliation completed     Drug Interactions   No significant drug interactions identified. Metoprolol and Myrbetriq noted but timed separately.     Assessment/Plan     Patient is experiencing urinary frequency, urgency, and incontinence. Previously, Myrbetriq reduced to 25 mg daily due to reduced renal function. She is following with nephrology closely who recently adjusted multiple of her medications. Since adjustment she started having more accidents, so she increased herself back to Myrbetriq 50 mg. Did recommend she go back to 25 mg daily due to her most recent renal function. However, given she is having more symptoms on lower dose and kidney function prevents her from increasing, did recommend trying Gemtesa 75 mg daily (can be used with current GFR and may provided better symptom management). Patient agreeable with plan.    Has tried before Oxybutynin/trospium.  Patient approved for [facility]    Gemtesa   Discussed MOA: works by activating beta-3 adrenergic receptors in the bladder resulting in relaxation of the detrusor smooth muscle during the urine storage phase, thus increasing bladder capacity.  Provided education on administration and potential side effects including but not limited to hypertension 9%, hot flashes <2%, constipation/diarrhea <2%, dry mouth <2%, and headache <4%.   Gemtesa (vibegron) starts working almost immediately - within a few days of first taking it, with noticeable improvements in urinary urgency, frequency, and incontinence noted in clinical trials at 2 weeks which were reported as significant by 12 weeks.    LABS  Lab Results   Component Value Date    GFRF 54 (A) 09/30/2022     GFR 22 mL/min as of 4/24/2025 (does have repeat labs that should be resulting soon)    SWITCH  Myrbetriq 25 mg daily to Gemtesa 75 mg daily     Follow-up: 6/16/2025 at 10:20 am with me and 8/4/25 @ 10:40 am (with [facility] for PAP renewal)     Time spent with pt:  Total length of time 30 (minutes) of the encounter and more than 50% was spent counseling the patient.     Patient Assistance Program Approval:     We are pleased to inform you that your application for assistance has been approved.     This approval is valid through 9/3/2025 as long as the following criteria continue to be satisfied:     Your medication (Myrbetriq) remains covered under your current insurance plan.   Your prescriber does not discontinue therapy.   You do not seek reimbursement from any other private or government-funded programs for the  medication.    Under this program, the pharmacy will first bill your insurance plan for your indemnified specified medication. The [employer] Fund will then offset your copay balance, so that your out-of pocket expense for your specialty medication will be $0.00.      Garth MalagonD      Continue all meds under the continuation of care with the referring provider and clinical pharmacy team.    Verbal consent to manage patient's drug therapy was obtained from the patient and/or an individual authorized to act on behalf of a patient. They were informed they may decline to participate or withdraw from participation in pharmacy services at any time.

## 2025-05-16 NOTE — Clinical Note
Pt renal fxn still low (she has repeat labs pending), now having accidents on Myrbetriq 25 mg daily so she self increased back to 50 mg daily (recommended against). Given renal fxn and symptoms, switched her to Gemtesa to see if Gemtesa can provide additional benefit since can be used with current renal fxn. Will follow up with her in a month

## 2025-05-16 NOTE — Clinical Note
Pt renal fxn still low - last GFR 22. Pt had lowered myrbetriq dose but then started having accidents so went back to 50 mg daily (I rec against). Given current renal function/symptoms discussed trying Gemtesa for hopefully better symptom management with current renal fxn. Did switch and told pt to stick with myrbetriq 25 mg daily until receives gemtesa

## 2025-05-23 DIAGNOSIS — M51.27 OTHER INTERVERTEBRAL DISC DISPLACEMENT, LUMBOSACRAL REGION: ICD-10-CM

## 2025-05-23 DIAGNOSIS — M51.369 DEGENERATIVE DISC DISEASE, LUMBAR: ICD-10-CM

## 2025-05-23 DIAGNOSIS — G89.29 CHRONIC RADICULAR LUMBAR PAIN: ICD-10-CM

## 2025-05-23 DIAGNOSIS — M51.26 HERNIATED NUCLEUS PULPOSUS, L4-5: ICD-10-CM

## 2025-05-23 DIAGNOSIS — M54.16 CHRONIC RADICULAR LUMBAR PAIN: ICD-10-CM

## 2025-05-23 DIAGNOSIS — M54.16 RIGHT LUMBAR RADICULOPATHY: ICD-10-CM

## 2025-05-23 DIAGNOSIS — Z79.891 LONG TERM (CURRENT) USE OF OPIATE ANALGESIC: ICD-10-CM

## 2025-05-23 RX ORDER — GABAPENTIN 300 MG/1
300 CAPSULE ORAL 2 TIMES DAILY
Qty: 60 CAPSULE | Refills: 0 | Status: SHIPPED | OUTPATIENT
Start: 2025-05-23

## 2025-05-27 ENCOUNTER — TELEPHONE (OUTPATIENT)
Dept: UROLOGY | Facility: CLINIC | Age: 70
End: 2025-05-27
Payer: MEDICARE

## 2025-05-27 DIAGNOSIS — I71.42 JUXTARENAL ABDOMINAL AORTIC ANEURYSM (AAA) WITHOUT RUPTURE: Primary | ICD-10-CM

## 2025-05-27 DIAGNOSIS — R30.0 DYSURIA: Primary | ICD-10-CM

## 2025-05-27 NOTE — TELEPHONE ENCOUNTER
Pt left message asking to drop urine off as she feels like she is getting another UTI. Order placed in system and pt informed and is dropping urine off today.

## 2025-05-27 NOTE — PROGRESS NOTES
"\"Eben Hernandez MD PhD; Lelo Sevilla RN  Good morning,  Pt states that she spoke with her kidney physician in regards to a CT scan that it is needed. She state that her kidney physician stated that it would be okay for her to have the CT scan completed. Can we place the order for pt?\"    "

## 2025-05-28 LAB
ANION GAP SERPL CALCULATED.4IONS-SCNC: 11 MMOL/L (CALC) (ref 7–17)
BUN SERPL-MCNC: 18 MG/DL (ref 7–25)
BUN/CREAT SERPL: 14 (CALC) (ref 6–22)
CALCIUM SERPL-MCNC: 9.4 MG/DL (ref 8.6–10.4)
CHLORIDE SERPL-SCNC: 104 MMOL/L (ref 98–110)
CO2 SERPL-SCNC: 25 MMOL/L (ref 20–32)
CREAT SERPL-MCNC: 1.33 MG/DL (ref 0.6–1)
EGFRCR SERPLBLD CKD-EPI 2021: 43 ML/MIN/1.73M2
GLUCOSE SERPL-MCNC: 93 MG/DL (ref 65–99)
POTASSIUM SERPL-SCNC: 5 MMOL/L (ref 3.5–5.3)
SODIUM SERPL-SCNC: 140 MMOL/L (ref 135–146)

## 2025-05-29 ENCOUNTER — HOSPITAL ENCOUNTER (OUTPATIENT)
Facility: CLINIC | Age: 70
Discharge: HOME | End: 2025-05-29
Payer: MEDICARE

## 2025-05-29 DIAGNOSIS — R06.00 DYSPNEA, UNSPECIFIED TYPE: ICD-10-CM

## 2025-05-29 PROCEDURE — 94010 BREATHING CAPACITY TEST: CPT

## 2025-05-29 PROCEDURE — 94727 GAS DIL/WSHOT DETER LNG VOL: CPT

## 2025-05-29 PROCEDURE — 94618 PULMONARY STRESS TESTING: CPT

## 2025-05-29 PROCEDURE — 94729 DIFFUSING CAPACITY: CPT

## 2025-05-30 ENCOUNTER — TELEPHONE (OUTPATIENT)
Dept: UROLOGY | Facility: CLINIC | Age: 70
End: 2025-05-30
Payer: MEDICARE

## 2025-05-30 DIAGNOSIS — N30.00 ACUTE CYSTITIS WITHOUT HEMATURIA: Primary | ICD-10-CM

## 2025-05-30 LAB
APPEARANCE UR: CLEAR
BACTERIA #/AREA URNS HPF: ABNORMAL /HPF
BACTERIA UR CULT: ABNORMAL
BACTERIA UR CULT: ABNORMAL
BILIRUB UR QL STRIP: NEGATIVE
COLOR UR: YELLOW
GLUCOSE UR QL STRIP: NEGATIVE
HGB UR QL STRIP: NEGATIVE
HYALINE CASTS #/AREA URNS LPF: ABNORMAL /LPF
KETONES UR QL STRIP: NEGATIVE
LEUKOCYTE ESTERASE UR QL STRIP: ABNORMAL
NITRITE UR QL STRIP: NEGATIVE
PH UR STRIP: 5.5 [PH] (ref 5–8)
PROT UR QL STRIP: NEGATIVE
RBC #/AREA URNS HPF: ABNORMAL /HPF
SERVICE CMNT-IMP: ABNORMAL
SP GR UR STRIP: 1.01 (ref 1–1.03)
SQUAMOUS #/AREA URNS HPF: ABNORMAL /HPF
WBC #/AREA URNS HPF: ABNORMAL /HPF

## 2025-05-30 RX ORDER — AMOXICILLIN AND CLAVULANATE POTASSIUM 500; 125 MG/1; MG/1
1 TABLET, FILM COATED ORAL 2 TIMES DAILY
Qty: 14 TABLET | Refills: 0 | Status: SHIPPED | OUTPATIENT
Start: 2025-05-30 | End: 2025-06-06

## 2025-05-30 NOTE — TELEPHONE ENCOUNTER
----- Message from Janett Giles sent at 5/30/2025  7:48 AM EDT -----  Looks colonized from vagina growing culture not a UTI but if symptoms and can't wait until Monday, would send in antibiotic, looking at allergies has she been able to take keflex in past or did it   cause rash as well? Will need to renal dose based on her kidney function. If not what antibiotics has done well with?  ----- Message -----  From: Evelyn FatSkunk Results In  Sent: 5/28/2025  10:59 PM EDT  To: Janett Giles, APRN-CNP

## 2025-05-30 NOTE — TELEPHONE ENCOUNTER
Detailed message left on pt VM asking for return call to let me know which abx she has tried and been ok with in the past.

## 2025-05-30 NOTE — TELEPHONE ENCOUNTER
Pt returned call and states she has had Nitrofurantoin in the past that has worked well, just this past March. Please send to local pharmacy, thanks.

## 2025-06-03 ENCOUNTER — HOSPITAL ENCOUNTER (OUTPATIENT)
Dept: RADIOLOGY | Facility: CLINIC | Age: 70
Discharge: HOME | End: 2025-06-03
Payer: MEDICARE

## 2025-06-03 DIAGNOSIS — I71.42 JUXTARENAL ABDOMINAL AORTIC ANEURYSM (AAA) WITHOUT RUPTURE: ICD-10-CM

## 2025-06-03 PROCEDURE — 2550000001 HC RX 255 CONTRASTS: Performed by: SURGERY

## 2025-06-03 PROCEDURE — 71275 CT ANGIOGRAPHY CHEST: CPT | Performed by: STUDENT IN AN ORGANIZED HEALTH CARE EDUCATION/TRAINING PROGRAM

## 2025-06-03 PROCEDURE — 74174 CTA ABD&PLVS W/CONTRAST: CPT | Performed by: STUDENT IN AN ORGANIZED HEALTH CARE EDUCATION/TRAINING PROGRAM

## 2025-06-03 PROCEDURE — 71275 CT ANGIOGRAPHY CHEST: CPT

## 2025-06-03 RX ADMIN — IOHEXOL 75 ML: 350 INJECTION, SOLUTION INTRAVENOUS at 10:40

## 2025-06-09 ENCOUNTER — OFFICE VISIT (OUTPATIENT)
Dept: VASCULAR SURGERY | Facility: HOSPITAL | Age: 70
End: 2025-06-09
Payer: MEDICARE

## 2025-06-09 VITALS
BODY MASS INDEX: 37.56 KG/M2 | WEIGHT: 220 LBS | OXYGEN SATURATION: 92 % | HEIGHT: 64 IN | HEART RATE: 85 BPM | DIASTOLIC BLOOD PRESSURE: 76 MMHG | SYSTOLIC BLOOD PRESSURE: 167 MMHG

## 2025-06-09 DIAGNOSIS — I71.41 PARARENAL ABDOMINAL AORTIC ANEURYSM (AAA) WITHOUT RUPTURE: Primary | ICD-10-CM

## 2025-06-09 PROCEDURE — 3077F SYST BP >= 140 MM HG: CPT | Performed by: SURGERY

## 2025-06-09 PROCEDURE — 3078F DIAST BP <80 MM HG: CPT | Performed by: SURGERY

## 2025-06-09 PROCEDURE — 4010F ACE/ARB THERAPY RXD/TAKEN: CPT | Performed by: SURGERY

## 2025-06-09 PROCEDURE — 3008F BODY MASS INDEX DOCD: CPT | Performed by: SURGERY

## 2025-06-09 PROCEDURE — 99214 OFFICE O/P EST MOD 30 MIN: CPT | Performed by: SURGERY

## 2025-06-09 NOTE — PROGRESS NOTES
Vascular Surgery Consult/Clinic Note    CC: Follow up     HPI:  Amanda De Jesus is 69 y.o. female with history of CAD (hx of PCI for MI), HTN, HLD and single right kidney who referred for AAA. Patient reports she is trying to quit tobacco use. She is here for a follow up visit for her juxta-renal AAA. She denies any abdominal pain. She has chronic back pain but denies any changes in severity or quality.   Pt reports her kidney function has been worsening and seeing OSH nephrologist.   She had CTA c/a/p done and here for a follow up visit.       Meds:   Medications Ordered Prior to Encounter[1]     Allergies:   RX Allergies[2]    SH:    Social Drivers of Health     Tobacco Use: High Risk (5/19/2025)    Received from [facility]    Patient History     Smoking Tobacco Use: Every Day     Smokeless Tobacco Use: Never     Passive Exposure: Not on file   Alcohol Use: Not At Risk (3/12/2025)    Received from [facility]    AUDIT-C     Frequency of Alcohol Consumption: Never     Average Number of Drinks: Patient declined     Frequency of Binge Drinking: Patient declined   Financial Resource Strain: Medium Risk (3/12/2025)    Received from [facility]    Overall Financial Resource Strain (CARDIA)     Difficulty of Paying Living Expenses: Somewhat hard   Food Insecurity: Food Insecurity Present (3/12/2025)    Received from [facility]    Hunger Vital Sign     Worried About Running Out of Food in the Last Year: Sometimes true     Ran Out of Food in the Last Year: Never true   Transportation Needs: No Transportation Needs (3/12/2025)    Received from [facility]    PRAPARE - Transportation     Lack of Transportation (Medical): No     Lack of Transportation (Non-Medical): No   Physical Activity: Unknown (10/25/2023)    Received from [facility]    Exercise Vital Sign     Days of Exercise per Week: 1 day     Minutes of Exercise per Session: Patient declined   Stress: No Stress Concern Present (10/25/2023)     Received from [facility]    [employer] - Occupational Stress Questionnaire     Feeling of Stress : Not at all   Social Connections: Unknown (3/12/2025)    Received from [facility]    Social Connection and Isolation Panel [NHANES]     Frequency of Communication with Friends and Family: Not on file     Frequency of Social Gatherings with Friends and Family: Not on file     Attends Caodaism Services: Not on file     Active Member of Clubs or Organizations: Yes     Attends Club or Organization Meetings: Patient declined     Marital Status:    Intimate Partner Violence: Not At Risk (10/25/2023)    Received from [facility]    Humiliation, Afraid, Rape, and Kick questionnaire     Fear of Current or Ex-Partner: No     Emotionally Abused: No     Physically Abused: No     Sexually Abused: No   Depression: Not at risk (5/13/2025)    PHQ-2     PHQ-2 Score: 0   Recent Concern: Depression - At risk (4/24/2025)    Received from [facility]    PHQ-2     Patient Health Questionnaire-2 Score: 5   Housing Stability: Unknown (3/12/2025)    Received from [facility]    Housing Stability Vital Sign     Unable to Pay for Housing in the Last Year: No     Number of Times Moved in the Last Year: Not on file     Homeless in the Last Year: No   Utilities: Not on file   Digital Equity: Not on file   Health Literacy: Adequate Health Literacy (3/12/2025)    Received from [facility]     Health Literacy     Frequency of need for help with medical instructions: Rarely        FH:  Family History[3]     Objective:  Vitals:  Vitals:    06/09/25 1019   BP: 167/76   Pulse: 85   SpO2:         Exam:  Gen: in NAD  GI: Soft, ND/NT  Ext:  BLE with 5/5 motor str. No tissue loss.     Labs:  5/28/2025  GFR 43, Cr. 1.33    Imaging:  CT abd/pelv non con (6/4/2025) independently reviewed.   Juxta-renal AAA approx. 53 mm when measured using SVS measuring guideline.   RRA stenosis  Chronic LRA  occlusion     CT abd/pelv non con (2024) independently reviewed.   Juxta-renal AAA approx. 50 mm when measured using SVS measuring guideline.   Chronic LRA occlusion     CT abd/pelv non con (2024) independently reviewed.   Juxta-renal AAA approx. 49 mm when measured using SVS measuring guideline.   Chronic LRA occlusion     CTA abd/pelv (2024) independently reviewed.   Juxta-renal AAA approx. 47 mm x 46 mm when measured using SVS measuring guideline.   Chronic LRA occlusion     CTA abd/pelv (2023) independently reviewed.   Juxta-renal AAA approx. 47 mm x 46 mm when measured using SVS measuring guideline.     Assessment & Plan:  Amanda De Jesus is 70 y.o. female with history of juxta-renal AAA approx. 53 mm with LRA  and RRA stenosis, and CKD.    - Will discuss her case at the multi-disciplinary aortic conference.    - PFT pending.    - Follow up in 3 weeks with telephone visit.       Raciel Hernandez MD, PhD  Clinical   [facility] [employer]  Co-Director, Aortic Center  [facility] [place]               [1]   Current Outpatient Medications on File Prior to Visit   Medication Sig Dispense Refill    amLODIPine (Norvasc) 5 mg tablet Take 1 tablet (5 mg) by mouth once daily in the morning. Take before meals.      [] amoxicillin-clavulanate (Augmentin) 500-125 mg tablet Take 1 tablet by mouth 2 times a day for 7 days. Renal dosed antibiotics for UTI, tolerated before 14 tablet 0    ascorbic acid, vitamin C, 500 mg capsule Take by mouth. Take as directed      aspirin 81 mg EC tablet Take 1 tablet (81 mg) by mouth once daily.      atorvastatin (Lipitor) 80 mg tablet Take 1 tablet (80 mg) by mouth once daily.      cyanocobalamin (Vitamin B-12) 1,000 mcg tablet Take by mouth once daily.      d-mannose 500 mg capsule Take 3 capsules (1,500 mg) by mouth 2 times a day.      ezetimibe (Zetia) 10 mg tablet TAKE ONE  TABLET BY MOUTH DAILY (Patient not taking: Reported on 5/14/2025) 90 tablet 3    famotidine-Ca carb-mag hydrox (Pepcid Complete) -165 mg chewable tablet Chew 1 tablet every 12 hours if needed.      furosemide (Lasix) 40 mg tablet Take 1 tablet (40 mg) by mouth once daily for 1 day. 1/2 tab a day per nephrology (Patient taking differently: Take 0.5 tablets (20 mg) by mouth once daily. 1/2 tab a day per nephrology) 1 tablet 0    gabapentin (Neurontin) 300 mg capsule TAKE ONE CAPSULE BY MOUTH TWO TIMES A DAY 60 capsule 0    HYDROcodone-acetaminophen (Norco) 5-325 mg tablet Take 1 tablet by mouth every 12 hours if needed for severe pain (7 - 10) for up to 28 days. Do not fill before May 30, 2025. 56 tablet 0    [START ON 6/27/2025] HYDROcodone-acetaminophen (Norco) 5-325 mg tablet Take 1 tablet by mouth every 12 hours if needed for severe pain (7 - 10) for up to 28 days. Do not fill before June 27, 2025. 56 tablet 0    losartan (Cozaar) 100 mg tablet Take 1 tablet (100 mg) by mouth once daily in the morning. Take before meals. (Patient taking differently: Take 0.5 tablets (50 mg) by mouth once daily.)      metFORMIN  mg 24 hr tablet Take 1 tablet (500 mg) by mouth once daily in the evening.      metoprolol succinate XL (Toprol-XL) 100 mg 24 hr tablet Take 1 tablet (100 mg) by mouth once daily.      multivitamin tablet Take 1 tablet by mouth once daily.      naloxone (Narcan) 4 mg/0.1 mL nasal spray Administer 1 spray (4 mg) into affected nostril(s) if needed for opioid reversal for up to 2 doses. May repeat every 2-3 minutes if needed, alternating nostrils, until medical assistance becomes available. 2 each 0    nicotine (Nicoderm CQ) 21 mg/24 hr patch Place 1 patch over 24 hours on the skin once every 24 hours. 30 patch 0    nystatin (Mycostatin) 100,000 unit/gram powder Apply 1 Application topically 2 times a day. Twice daily x 2 weeks then 2-3 x per week as needed for yeast under skin folds 60 g 2     potassium chloride CR 10 mEq ER tablet Take 1 tablet (10 mEq) by mouth once daily. (Patient not taking: Reported on 5/14/2025)      vibegron (Gemtesa) 75 mg tablet Take 1 tablet (75 mg) by mouth once daily. 30 tablet 2    zinc gluconate 100 mg tablet Take by mouth once daily. Zinc 100 MG Oral Tablet; TAKE AS DIRECTED.       No current facility-administered medications on file prior to visit.   [2]   Allergies  Allergen Reactions    Lansoprazole Anaphylaxis and Swelling    Semaglutide Nausea Only    Cefprozil Rash and Swelling   [3]   Family History  Problem Relation Name Age of Onset    Breast cancer Mother Maci Holly     Other (Varicose veins) Mother Maci Barrera     Heart disease Father Tj Barrera     Kidney failure Father Tj Barrera     Diabetes type II Father Tj Barrera     Kidney disease Father Tj Barrera     Diabetes Father Tj Barrera     Hypertension Father Tj Barrera     Pancreatic cancer Other sibling     Cancer Brother Alvin Barrera     Breast cancer Brother Alvin Barrera     Cancer Brother Alvin Barrera     Breast cancer Brother Alvin Barrera

## 2025-06-16 ENCOUNTER — APPOINTMENT (OUTPATIENT)
Dept: PHARMACY | Facility: HOSPITAL | Age: 70
End: 2025-06-16
Payer: MEDICARE

## 2025-06-16 DIAGNOSIS — N39.41 URGE INCONTINENCE: ICD-10-CM

## 2025-06-16 PROCEDURE — RXMED WILLOW AMBULATORY MEDICATION CHARGE

## 2025-06-16 NOTE — PROGRESS NOTES
Patient ID: Amanda De Jesus is a 70 y.o. female who presents for Urge incontinence.    Referring Provider: Janett Giles   Pt was referred for cost assistance     Preferred Pharmacy:      [id number] - [place], [place] - [address] [facility]  [address] [facility] [address]  Phone: [phone] Fax: [phone]    [facility] - [place], [place] - [address]  [address]  [address]  [facility] [address]  Phone: [phone] Fax: [phone]    [facility]  [address], [address]  [facility] [address]  Phone: [phone] Fax: [phone]      Copay assistance:   Do you have copays on your medications? Yes  Do you have trouble affording your current medications? Yes     Patient Assistance Screening (VAF)    Patient verbally reports monthly or yearly income which is less than 400% federal poverty level   Application for program has been submitted for the following medications:   Myrbetriq > Gemtesa  Patient has been informed that program team will be reaching out to them to discuss necessary documentation, instructed to answer phone/return voicemail.   Patient aware this process may take up to 6 weeks.   If approved medication must be filled through [facility] pharmacy and may be picked up or mailed to patient.       Subjective      Patient reports she has been dealing with a lot recently - her son-in-law recently passed away, she has to move this summer, she has stage 4 kidney failure currently and has been following with nephrology closely (medications were adjusted to hopefully help improve kidney function), and she will need open heart surgery to repair her aortic aneurysm.      Vaginal Symptoms  Vaginal Dryness: None   Dysuria (pain, burning, stinging, or itching with urination): None (only had burning when had UTI)  Vaginal and/or vulvar irritation/itching: No   Recurrent urinary tract infections: yes     Urinary Symptoms  Medications that  "may contribute to symptoms:   Diuretics - discussed previously   Any history of:   Narrow-angle glaucoma? No   Impaired gastric emptying? No   Urinary retention? No     If diabetes, blood sugar controlled? HbA1c 6.4%  Frequently of bowel movement? Couple times a day   Tobacco use? Yes, smoke half a pack a day   How many protective undergarments are you utilizing daily? Panty liners     What medications have been tried/stopped?   Oxybutynin/trospium - didn't work and nausea   Current medication? Myrbetriq 25 mg daily -   When started? Has been on Myrbetriq for almost a year, reduced to 25 mg due to variable kidney function   Side effects? No  Improvement in symptoms? Lower dose of Myrbetriq has helped some during the day, but not at night      Cardiovascular Health  The ASCVD Risk score (Lance ADAMS, et al., 2019) failed to calculate for the following reasons:    Risk score cannot be calculated because patient has a medical history suggesting prior/existing ASCVD    Lab Results   Component Value Date    CHOL 158 02/27/2024     Lab Results   Component Value Date    HDL 53.3 02/27/2024     Lab Results   Component Value Date    LDLCALC 62 02/27/2024     Lab Results   Component Value Date    TRIG 214 (H) 02/27/2024     No components found for: \"CHOLHDL\"        Blood Sugar Balance  Lab Results   Component Value Date    GLUCOSE 93 05/28/2025    HGBA1C 6.4 (H) 02/27/2024    HGBA1C 6.4 (H) 02/27/2024    HGBA1C 6.6 (H) 10/23/2023    HGBA1C 6.6 (H) 10/23/2023     No results found for: \"LEPTIN\", \"INSULFAST\", \"GLUF\"      Thyroid  Lab Results   Component Value Date    TSH 1.77 10/23/2023    FREET4 1.24 06/16/2021       Iron Status  No results found for: \"IRON\", \"TIBC\", \"FERRITIN\"     Kidney Function  Lab Results   Component Value Date    GFRF 54 (A) 09/30/2022    CREATININE 1.33 (H) 05/28/2025       Potassium  Lab Results   Component Value Date    K 5.0 05/28/2025        Vitamin D3  Lab Results   Component Value Date    VITD25 39 " 08/05/2022       Current Outpatient Medications on File Prior to Visit   Medication Sig Dispense Refill    amLODIPine (Norvasc) 5 mg tablet Take 1 tablet (5 mg) by mouth once daily in the morning. Take before meals.      ascorbic acid, vitamin C, 500 mg capsule Take by mouth. Take as directed      aspirin 81 mg EC tablet Take 1 tablet (81 mg) by mouth once daily.      atorvastatin (Lipitor) 80 mg tablet Take 1 tablet (80 mg) by mouth once daily.      cyanocobalamin (Vitamin B-12) 1,000 mcg tablet Take by mouth once daily.      d-mannose 500 mg capsule Take 3 capsules (1,500 mg) by mouth 2 times a day.      ezetimibe (Zetia) 10 mg tablet TAKE ONE TABLET BY MOUTH DAILY (Patient not taking: Reported on 5/14/2025) 90 tablet 3    famotidine-Ca carb-mag hydrox (Pepcid Complete) -165 mg chewable tablet Chew 1 tablet every 12 hours if needed.      furosemide (Lasix) 40 mg tablet Take 1 tablet (40 mg) by mouth once daily for 1 day. 1/2 tab a day per nephrology (Patient taking differently: Take 0.5 tablets (20 mg) by mouth once daily. 1/2 tab a day per nephrology) 1 tablet 0    gabapentin (Neurontin) 300 mg capsule TAKE ONE CAPSULE BY MOUTH TWO TIMES A DAY 60 capsule 0    HYDROcodone-acetaminophen (Norco) 5-325 mg tablet Take 1 tablet by mouth every 12 hours if needed for severe pain (7 - 10) for up to 28 days. Do not fill before May 30, 2025. 56 tablet 0    [START ON 6/27/2025] HYDROcodone-acetaminophen (Norco) 5-325 mg tablet Take 1 tablet by mouth every 12 hours if needed for severe pain (7 - 10) for up to 28 days. Do not fill before June 27, 2025. 56 tablet 0    losartan (Cozaar) 100 mg tablet Take 1 tablet (100 mg) by mouth once daily in the morning. Take before meals. (Patient taking differently: Take 0.5 tablets (50 mg) by mouth once daily.)      metFORMIN  mg 24 hr tablet Take 1 tablet (500 mg) by mouth once daily in the evening.      metoprolol succinate XL (Toprol-XL) 100 mg 24 hr tablet Take 1 tablet  (100 mg) by mouth once daily.      multivitamin tablet Take 1 tablet by mouth once daily.      naloxone (Narcan) 4 mg/0.1 mL nasal spray Administer 1 spray (4 mg) into affected nostril(s) if needed for opioid reversal for up to 2 doses. May repeat every 2-3 minutes if needed, alternating nostrils, until medical assistance becomes available. 2 each 0    nicotine (Nicoderm CQ) 21 mg/24 hr patch Place 1 patch over 24 hours on the skin once every 24 hours. 30 patch 0    nystatin (Mycostatin) 100,000 unit/gram powder Apply 1 Application topically 2 times a day. Twice daily x 2 weeks then 2-3 x per week as needed for yeast under skin folds 60 g 2    potassium chloride CR 10 mEq ER tablet Take 1 tablet (10 mEq) by mouth once daily. (Patient not taking: Reported on 5/14/2025)      vibegron (Gemtesa) 75 mg tablet Take 1 tablet (75 mg) by mouth once daily. 30 tablet 2    zinc gluconate 100 mg tablet Take by mouth once daily. Zinc 100 MG Oral Tablet; TAKE AS DIRECTED.       No current facility-administered medications on file prior to visit.        Medication and allergy reconciliation completed     Drug Interactions   No significant drug interactions identified. Metoprolol and Myrbetriq noted but timed separately.     Assessment/Plan     Patient is experiencing urinary frequency, urgency, and incontinence. Previously, Myrbetriq reduced to 25 mg daily due to reduced renal function. We discussed switching to Gemtesa last visit, but it was never shipped so she has continued on Myrbetriq 25 mg daily. On the lower dose of Myrbetriq, she is now waking up wet at night. Will continue with previous plan to try Gemtesa as given variable renal function do not want to go back to Myrbetriq 50 mg daily. Will contact pharmacy to make sure prescription going to be sent out. Patient agreeable with plan.  Has tried before Oxybutynin/trospium.  Patient approved for [facility]    Gemtesa   Discussed MOA: works by activating beta-3 adrenergic  receptors in the bladder resulting in relaxation of the detrusor smooth muscle during the urine storage phase, thus increasing bladder capacity.  Provided education on administration and potential side effects including but not limited to hypertension 9%, hot flashes <2%, constipation/diarrhea <2%, dry mouth <2%, and headache <4%.   Gemtesa (vibegron) starts working almost immediately - within a few days of first taking it, with noticeable improvements in urinary urgency, frequency, and incontinence noted in clinical trials at 2 weeks which were reported as significant by 12 weeks.    LABS  Lab Results   Component Value Date    GFRF 54 (A) 09/30/2022     GFR 43 mL/min as of 5/28/2025    SWITCH  Myrbetriq 25 mg daily to Gemtesa 75 mg daily     Follow-up: 7/11/2025 with Zena      Time spent with pt: Total length of time 20 (minutes) of the encounter and more than 50% was spent counseling the patient.     Patient Assistance Program Approval:     We are pleased to inform you that your application for assistance has been approved.     This approval is valid through 9/3/2025 as long as the following criteria continue to be satisfied:     Your medication (Myrbetriq) remains covered under your current insurance plan.   Your prescriber does not discontinue therapy.   You do not seek reimbursement from any other private or government-funded programs for the  medication.    Under this program, the pharmacy will first bill your insurance plan for your indemnified specified medication. The [employer] Assistance Fund will then offset your copay balance, so that your out-of pocket expense for your specialty medication will be $0.00.      Garth MalagonD      Continue all meds under the continuation of care with the referring provider and clinical pharmacy team.    Verbal consent to manage patient's drug therapy was obtained from the patient and/or an individual authorized to act on behalf of a patient. They were informed  they may decline to participate or withdraw from participation in pharmacy services at any time.

## 2025-06-16 NOTE — Clinical Note
Pt still has not started Gemtesa as it was not shipped; has continued myrbetriq 25 mg daily but still having significant leaks. Did reach out to pharmacy to fill Gemtesa

## 2025-06-18 ENCOUNTER — PHARMACY VISIT (OUTPATIENT)
Dept: PHARMACY | Facility: CLINIC | Age: 70
End: 2025-06-18
Payer: COMMERCIAL

## 2025-06-23 ENCOUNTER — APPOINTMENT (OUTPATIENT)
Dept: VASCULAR SURGERY | Facility: HOSPITAL | Age: 70
End: 2025-06-23
Payer: MEDICARE

## 2025-06-23 ENCOUNTER — APPOINTMENT (OUTPATIENT)
Dept: RADIOLOGY | Facility: HOSPITAL | Age: 70
End: 2025-06-23
Payer: MEDICARE

## 2025-07-07 ENCOUNTER — APPOINTMENT (OUTPATIENT)
Dept: VASCULAR SURGERY | Facility: HOSPITAL | Age: 70
End: 2025-07-07
Payer: MEDICARE

## 2025-07-08 ENCOUNTER — HOSPITAL ENCOUNTER (OUTPATIENT)
Facility: HOSPITAL | Age: 70
Setting detail: SURGERY ADMIT
End: 2025-07-08
Attending: SURGERY | Admitting: SURGERY
Payer: MEDICARE

## 2025-07-08 DIAGNOSIS — I71.42 JUXTARENAL ABDOMINAL AORTIC ANEURYSM (AAA) WITHOUT RUPTURE: Primary | ICD-10-CM

## 2025-07-08 DIAGNOSIS — I71.42 JUXTARENAL ABDOMINAL AORTIC ANEURYSM (AAA) WITHOUT RUPTURE: ICD-10-CM

## 2025-07-10 NOTE — PROGRESS NOTES
Patient ID: Amanda De Jesus is a 70 y.o. female who presents for Urinary incontinence    Referring Provider: Janett Giles   Pt was referred for cost assistance     Preferred Pharmacy:      [id number] - [place], [place] - [address] [facility]  [address] [facility] [address]  Phone: [phone] Fax: [phone]    [facility] - [place], [place] - [address]  [address]  [address]  [facility] [address]  Phone: [phone] Fax: [phone]    [facility]  [address], [address]  [facility] [address]  Phone: [phone] Fax: [phone]      Subjective      Vaginal Symptoms  Vaginal Dryness: None   Dysuria (pain, burning, stinging, or itching with urination): None (only had burning when had UTI)  Vaginal and/or vulvar irritation/itching: No   Recurrent urinary tract infections: yes     Urinary Symptoms  Medications that may contribute to symptoms:   Diuretics - discussed previously   Any history of:   Narrow-angle glaucoma? No   Impaired gastric emptying? No   Urinary retention? No     If diabetes, blood sugar controlled? HbA1c 6.4%  Frequently of bowel movement? Couple times a day   Tobacco use? Yes, smoke half a pack a day   How many protective undergarments are you utilizing daily? Panty liners     What medications have been tried/stopped?   Oxybutynin/trospium - didn't work and nausea   Myrbetriq - ineffective  Current medication? Gemtesa 75 mg daily  When started? 6/2025  Side effects? No  Improvement in symptoms? Great symptoms improvement, hold during the day    Cardiovascular Health  The ASCVD Risk score (Lance ADAMS, et al., 2019) failed to calculate for the following reasons:    Risk score cannot be calculated because patient has a medical history suggesting prior/existing ASCVD    Lab Results   Component Value Date    CHOL 158 02/27/2024     Lab Results   Component Value Date    HDL 53.3 02/27/2024     Lab Results   Component Value  "Date    LDLCALC 62 02/27/2024     Lab Results   Component Value Date    TRIG 214 (H) 02/27/2024     No components found for: \"CHOLHDL\"        Blood Sugar Balance  Lab Results   Component Value Date    GLUCOSE 93 05/28/2025    HGBA1C 6.4 (H) 02/27/2024    HGBA1C 6.4 (H) 02/27/2024    HGBA1C 6.6 (H) 10/23/2023    HGBA1C 6.6 (H) 10/23/2023     No results found for: \"LEPTIN\", \"INSULFAST\", \"GLUF\"      Thyroid  Lab Results   Component Value Date    TSH 1.77 10/23/2023    FREET4 1.24 06/16/2021       Iron Status  No results found for: \"IRON\", \"TIBC\", \"FERRITIN\"     Kidney Function  Lab Results   Component Value Date    GFRF 54 (A) 09/30/2022    CREATININE 1.33 (H) 05/28/2025       Potassium  Lab Results   Component Value Date    K 5.0 05/28/2025        Vitamin D3  Lab Results   Component Value Date    VITD25 39 08/05/2022       Current Outpatient Medications on File Prior to Visit   Medication Sig Dispense Refill    amLODIPine (Norvasc) 5 mg tablet Take 1 tablet (5 mg) by mouth once daily in the morning. Take before meals.      ascorbic acid, vitamin C, 500 mg capsule Take by mouth. Take as directed      aspirin 81 mg EC tablet Take 1 tablet (81 mg) by mouth once daily.      atorvastatin (Lipitor) 80 mg tablet Take 1 tablet (80 mg) by mouth once daily.      cyanocobalamin (Vitamin B-12) 1,000 mcg tablet Take by mouth once daily.      d-mannose 500 mg capsule Take 3 capsules (1,500 mg) by mouth 2 times a day.      ezetimibe (Zetia) 10 mg tablet TAKE ONE TABLET BY MOUTH DAILY (Patient not taking: Reported on 5/14/2025) 90 tablet 3    famotidine-Ca carb-mag hydrox (Pepcid Complete) -165 mg chewable tablet Chew 1 tablet every 12 hours if needed.      furosemide (Lasix) 40 mg tablet Take 1 tablet (40 mg) by mouth once daily for 1 day. 1/2 tab a day per nephrology (Patient taking differently: Take 0.5 tablets (20 mg) by mouth once daily. 1/2 tab a day per nephrology) 1 tablet 0    gabapentin (Neurontin) 300 mg capsule TAKE " ONE CAPSULE BY MOUTH TWO TIMES A DAY 60 capsule 0    HYDROcodone-acetaminophen (Norco) 5-325 mg tablet Take 1 tablet by mouth every 12 hours if needed for severe pain (7 - 10) for up to 28 days. Do not fill before June 27, 2025. 56 tablet 0    losartan (Cozaar) 100 mg tablet Take 1 tablet (100 mg) by mouth once daily in the morning. Take before meals. (Patient taking differently: Take 0.5 tablets (50 mg) by mouth once daily.)      metFORMIN  mg 24 hr tablet Take 1 tablet (500 mg) by mouth once daily in the evening.      metoprolol succinate XL (Toprol-XL) 100 mg 24 hr tablet Take 1 tablet (100 mg) by mouth once daily.      multivitamin tablet Take 1 tablet by mouth once daily.      naloxone (Narcan) 4 mg/0.1 mL nasal spray Administer 1 spray (4 mg) into affected nostril(s) if needed for opioid reversal for up to 2 doses. May repeat every 2-3 minutes if needed, alternating nostrils, until medical assistance becomes available. 2 each 0    nicotine (Nicoderm CQ) 21 mg/24 hr patch Place 1 patch over 24 hours on the skin once every 24 hours. 30 patch 0    nystatin (Mycostatin) 100,000 unit/gram powder Apply 1 Application topically 2 times a day. Twice daily x 2 weeks then 2-3 x per week as needed for yeast under skin folds 60 g 2    potassium chloride CR 10 mEq ER tablet Take 1 tablet (10 mEq) by mouth once daily. (Patient not taking: Reported on 5/14/2025)      vibegron (Gemtesa) 75 mg tablet Take 1 tablet (75 mg) by mouth once daily. 30 tablet 2    zinc gluconate 100 mg tablet Take by mouth once daily. Zinc 100 MG Oral Tablet; TAKE AS DIRECTED.       No current facility-administered medications on file prior to visit.        Medication and allergy reconciliation completed     Drug Interactions   No significant drug interactions identified. Metoprolol and Myrbetriq noted but timed separately.     Assessment/Plan     Patient was experiencing urinary frequency, urgency, and incontinence. At last appointment, patient  was switched from myrbetriq to gemtesa. Patient noted great improvement since starting gemtesa. She has been able to hold bladder better, no more leakage. Patient reported no side effect. Patient noted BP has been stable, she has been watching salt and water intake.   Has tried before Oxybutynin, trospium, myrbetriq  Patient approved for [facility]    Gemtesa   Discussed MOA: works by activating beta-3 adrenergic receptors in the bladder resulting in relaxation of the detrusor smooth muscle during the urine storage phase, thus increasing bladder capacity.  Provided education on administration and potential side effects including but not limited to hypertension 9%, hot flashes <2%, constipation/diarrhea <2%, dry mouth <2%, and headache <4%.   Gemtesa (vibegron) starts working almost immediately - within a few days of first taking it, with noticeable improvements in urinary urgency, frequency, and incontinence noted in clinical trials at 2 weeks which were reported as significant by 12 weeks.    Continue   Gemtesa 75 mg daily          Patient Assistance Program (PAP) - RENEWAL     Per regulation, patient is due for their annual renewal for their  PAP application. Patient's application is due for renewal by 9/25/25. Patient understands that if proper documentation is not received before renewal date, they will no longer be able to receive approved medication(s) free of charge.     Application for program to be submitted for the following medications: [facility] Permanent Address: [facility], planning to move, unsure when   Prescription Insurance:   Yes   Members of Household: 2   Files Taxes: Yes     Patient will be email financial information to pharmacist directly at at this [facility].    Patient verbally reports monthly or yearly income which is less than 400% federal poverty level    Patient aware this process may take up to 6 weeks.     If approved medication must be filled through [facility] and  MEDICATION WILL BE MAILED TO PATIENT.       Follow-up: 10/10/2025 9:00 AM with Beryl     Time spent with pt: Total length of time 15 (minutes) of the encounter and more than 50% was spent counseling the patient.    Zena Hernandez PharmD  [facility] Clinical Pharmacist  Phone: [phone]       Continue all meds under the continuation of care with the referring provider and clinical pharmacy team.    Verbal consent to manage patient's drug therapy was obtained from the patient and/or an individual authorized to act on behalf of a patient. They were informed they may decline to participate or withdraw from participation in pharmacy services at any time.

## 2025-07-11 ENCOUNTER — APPOINTMENT (OUTPATIENT)
Dept: PHARMACY | Facility: HOSPITAL | Age: 70
End: 2025-07-11
Payer: MEDICARE

## 2025-07-11 DIAGNOSIS — N39.41 URGE INCONTINENCE: ICD-10-CM

## 2025-07-14 ENCOUNTER — TELEMEDICINE (OUTPATIENT)
Dept: VASCULAR SURGERY | Facility: HOSPITAL | Age: 70
End: 2025-07-14
Payer: MEDICARE

## 2025-07-14 ENCOUNTER — APPOINTMENT (OUTPATIENT)
Dept: VASCULAR SURGERY | Facility: HOSPITAL | Age: 70
End: 2025-07-14
Payer: MEDICARE

## 2025-07-14 DIAGNOSIS — I71.42 JUXTARENAL ABDOMINAL AORTIC ANEURYSM (AAA) WITHOUT RUPTURE: Primary | ICD-10-CM

## 2025-07-14 NOTE — PROGRESS NOTES
The patient's case was discussed at our multi-disciplinary aortic conference including but not limited to Trish Jacques, Claude, and myself.   We reviewed the patient's CTA. 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   Given above issues, we felt that the risk of surgery with renal loss, and embolic risk (mesenteric ischemia, renal loss) were too high for the risk of rupture at current AAA size.   The group's decision was to do 6 month surveillance image, and reassess the surgery if the aneurysm were to expand.   I discussed this decision with the patient today and answered all her questions. She was encouraged to call back with any more questions.   For now, we will cont. Medical therapy, and if the case of abdominal or back pain, seek immediate medical attention at the ER.     Raciel Hernandez MD, PhD  Clinical   [employer] [employer] [employer]  Co-Director, Aortic Center  [facility] [place]

## 2025-07-16 ENCOUNTER — APPOINTMENT (OUTPATIENT)
Dept: PREADMISSION TESTING | Facility: HOSPITAL | Age: 70
End: 2025-07-16
Payer: MEDICARE

## 2025-07-21 PROCEDURE — RXMED WILLOW AMBULATORY MEDICATION CHARGE

## 2025-07-22 ENCOUNTER — APPOINTMENT (OUTPATIENT)
Dept: PAIN MEDICINE | Facility: CLINIC | Age: 70
End: 2025-07-22
Payer: MEDICARE

## 2025-07-22 DIAGNOSIS — Z79.891 LONG TERM (CURRENT) USE OF OPIATE ANALGESIC: ICD-10-CM

## 2025-07-22 DIAGNOSIS — M54.16 RIGHT LUMBAR RADICULOPATHY: ICD-10-CM

## 2025-07-22 DIAGNOSIS — M51.369 DEGENERATIVE DISC DISEASE, LUMBAR: ICD-10-CM

## 2025-07-22 DIAGNOSIS — M51.27 OTHER INTERVERTEBRAL DISC DISPLACEMENT, LUMBOSACRAL REGION: ICD-10-CM

## 2025-07-22 DIAGNOSIS — G89.29 CHRONIC RADICULAR LUMBAR PAIN: ICD-10-CM

## 2025-07-22 DIAGNOSIS — M54.16 CHRONIC RADICULAR LUMBAR PAIN: ICD-10-CM

## 2025-07-22 DIAGNOSIS — M51.26 HERNIATED NUCLEUS PULPOSUS, L4-5: ICD-10-CM

## 2025-07-22 PROCEDURE — 1160F RVW MEDS BY RX/DR IN RCRD: CPT | Performed by: PHYSICAL MEDICINE & REHABILITATION

## 2025-07-22 PROCEDURE — 1159F MED LIST DOCD IN RCRD: CPT | Performed by: PHYSICAL MEDICINE & REHABILITATION

## 2025-07-22 PROCEDURE — G2211 COMPLEX E/M VISIT ADD ON: HCPCS | Performed by: PHYSICAL MEDICINE & REHABILITATION

## 2025-07-22 PROCEDURE — 99214 OFFICE O/P EST MOD 30 MIN: CPT | Performed by: PHYSICAL MEDICINE & REHABILITATION

## 2025-07-22 PROCEDURE — 4010F ACE/ARB THERAPY RXD/TAKEN: CPT | Performed by: PHYSICAL MEDICINE & REHABILITATION

## 2025-07-22 RX ORDER — HYDROCODONE BITARTRATE AND ACETAMINOPHEN 5; 325 MG/1; MG/1
1 TABLET ORAL EVERY 12 HOURS PRN
Qty: 56 TABLET | Refills: 0 | Status: SHIPPED | OUTPATIENT
Start: 2025-09-03 | End: 2025-10-01

## 2025-07-22 RX ORDER — HYDROCODONE BITARTRATE AND ACETAMINOPHEN 5; 325 MG/1; MG/1
1 TABLET ORAL EVERY 12 HOURS PRN
Qty: 56 TABLET | Refills: 0 | Status: SHIPPED | OUTPATIENT
Start: 2025-08-06 | End: 2025-09-03

## 2025-07-22 RX ORDER — CYCLOBENZAPRINE HCL 5 MG
5 TABLET ORAL 2 TIMES DAILY PRN
Qty: 60 TABLET | Refills: 2 | Status: SHIPPED | OUTPATIENT
Start: 2025-07-22 | End: 2025-08-21

## 2025-07-22 NOTE — PROGRESS NOTES
Chief complaint  Back and lower limbs      Leatha BUSTAMANTE LPN,   was present during the entire history and physical examination    History  Amanda De Jesus is back for pain management office visit  Recently diagnosed with Aortic aneurysm in the thorax. The surgery is risky and she is anxious getting SNRI from Dr Danielle  Also stopped artemio for renal condition  Also pain meds are not controlling the pain  Currently on hydrocodone 5 mg bid. That helped but 1/2 pill qid did not help will consider with one pill bid  Chronic pain in back and lower limbs   Today evaluation to assess the need to continue pain medications, check on the compliance with treatment plan and assess potential to cut back on the pain medications.     Considering further cut back on the pain meds    Pain level without medication is 8/10 , with the medication pain level  2 to 3/10.     Pain disability index (PDI) improvement   across different functional categories, with the pain control with the meds. Forms filled by patient are scanned in the chart    The pain meds are helping control the pain and improving Activities of Daily living and quality of life and quality of sleep.    opioids treatment agreement Jan 2025    Pill count today, using count tray, and in front of patient, Nurse and myself :  29    pills , last fill was on 7/9  for 56 tabs,  the meds pill count today is correct  Oarrs pulled and reviewed, no concerns  last urine toxicology testing was compliant this was done on  April 2025  Xray updated spine   ORT (opioid risk tool) score is  0  Pain pathology and pain generators spine   Modalities tried injection, surgery, physical therapy, TENS unit, nonsteroidal anti-inflammatory medication       Review of Systems :  Denied any fever or chills. No weight loss and no night sweats. No cough or sputum production. No diarrhea   The constipation has been responding to fibers and over the counter medications.     No bladder and bowel incontinence and  "no other changes in bladder and bowel. No skin changes.  Reports tiredness and fatigability only if the pain is not controlled.     I further Asked about symptoms or changes affecting the vision, hearing, breathing, digestive system, urinary symptoms, skin, other musculoskeletal condition, neurological conditions these are negative except as detailed in the history and physical examination above    Denied opioids diversion and abuse and denies alcoholism. Denies overuse of the pain medications.  No reported euphoria sensation or getting a \"high\" on the pain medications.    The control of the pain with the pain medications is helping the control of the symptoms and allowing the function and activities of daily living, enjoyment of life, improving the quality of life and sleep with less interruption by the pain. The goal is symptomatic control of the nonmalignant chronic pain and not to repair the permanent damage in the tissues inducing the chronic pain conditions. We are aiming to shift the focus from the nonmalignant chronic pain to other aspects of life by symptomatically treating this chronic pain. If this pain is not treated it will lead to major morbidity and it is also associated with increased risks of mortality. The patient understands those very clearly and also understand high risks of morbidity and mortality if not strictly adherent to the treatment recommendations and reporting any associated side effects. Also patient understand the full responsibility associated with these medications to avoid abuse or overuse or any use of these medications for anything besides treating the patient's own chronic pain and nothing else under any circumstances.        Physical examination  Awake, alert and oriented for time place and persons   Pupils are equal and reactive to light and accommodation    plantar cutaneous reflex are down going bilaterally   SLR increased back pain   Mann negative   widened perimeter of " stance      Diagnosis  Problem List Items Addressed This Visit       Degenerative disc disease, lumbar    Relevant Medications    HYDROcodone-acetaminophen (Norco) 5-325 mg tablet (Start on 8/6/2025)    HYDROcodone-acetaminophen (Norco) 5-325 mg tablet (Start on 9/3/2025)    cyclobenzaprine (Flexeril) 5 mg tablet    Herniated nucleus pulposus, L4-5    Relevant Medications    HYDROcodone-acetaminophen (Norco) 5-325 mg tablet (Start on 8/6/2025)    HYDROcodone-acetaminophen (Norco) 5-325 mg tablet (Start on 9/3/2025)    cyclobenzaprine (Flexeril) 5 mg tablet    Other intervertebral disc displacement, lumbosacral region    Relevant Medications    HYDROcodone-acetaminophen (Norco) 5-325 mg tablet (Start on 8/6/2025)    HYDROcodone-acetaminophen (Norco) 5-325 mg tablet (Start on 9/3/2025)    cyclobenzaprine (Flexeril) 5 mg tablet    Right lumbar radiculopathy    Relevant Medications    HYDROcodone-acetaminophen (Norco) 5-325 mg tablet (Start on 8/6/2025)    HYDROcodone-acetaminophen (Norco) 5-325 mg tablet (Start on 9/3/2025)    cyclobenzaprine (Flexeril) 5 mg tablet    Chronic radicular lumbar pain    Relevant Medications    HYDROcodone-acetaminophen (Norco) 5-325 mg tablet (Start on 8/6/2025)    HYDROcodone-acetaminophen (Norco) 5-325 mg tablet (Start on 9/3/2025)    cyclobenzaprine (Flexeril) 5 mg tablet    Long term (current) use of opiate analgesic    Relevant Medications    HYDROcodone-acetaminophen (Norco) 5-325 mg tablet (Start on 8/6/2025)    HYDROcodone-acetaminophen (Norco) 5-325 mg tablet (Start on 9/3/2025)    cyclobenzaprine (Flexeril) 5 mg tablet        Plan  Reviewed the pain generators.  Went over the types of pain with neuropathic and nociceptive and different pathologies and therapeutic modalities. Discussed the mechanism of action of interventions from acupuncture, physical therapy , regular exercises, injections, botox, spinal cord stimulation, and role of surgery     Went over pathology of the  intervertebral disc displacement and the anatomical relation to the Nerve roots and relation to the radicular symptoms. Went over treatment modalities with conservative treatment including acupuncture   and epidural steroid injection with fluoroscopy guidance and last resort of surgery    Based on the above findings and the clinical response to the opioids medications and improvement of the activities of daily living, sleep, and work performance. We made this complex decision to continue the opioids therapy in light of the evidence of the patient's responsibility in using the pain medications as prescribed for the nonmalignant chronic pain condition. We discussed about the use of the pain medications to treat the symptoms of chronic nonmalignant pain and we are not trying the repair the permanent damage in the tissues, rather we are trying to control the symptoms induced by the permanent damage to the tissues inducing the chronic pain condition and resulting disability. I explained the difference and discussed it with the patient and stressed the importance of knowing the difference especially because of the potential side effects and the potential addicting effect and habit forming nature of the dangerous drugs we are using to treat the symptoms of the chronic pain.      We discussed that we are prescribing the medications on good manas and legitimate medical reason within the scope of professional medical practice.     We reviewed the side effects and precautions of opioids prescriptions as discussed in the opioids treatment agreement.    realizes the interaction between the therapeutic classes including the respiratory depression and potential death     Random drug testing   we will submit         Discussed about NSAIDS and I explained about the opioids sparing effect to allow keeping the opioids dose at minimal effective dose.   I went over the potential side effects of the NSAIDS on the gastrointestinal, renal  and cardiovascular systems.      I detailed the side effects from the acetaminophen in the medication and made aware of those. I also explained about the cumulative effects on the organs and mainly the liver.     Given the opioids therapy , we discussed about the risk for accidental over dose on the pain medications, either for patient or other household. I went over the mechanism of action and mode of use of the Naloxone according to the  recommendations. I will provide a prescription for a kit.     Focus of Today's Visit :  Stopping artemio  Hydrocdone 5 mg bid. Try cutting back further  DAYSI for aggravation    Home exercises program       Follow-up 8 weeks or earlier if needed     The level of clinical decision making in this office visit,  is high, given the high risks of complications with the morbidity and mortality due to the fact that acute and chronic pain may pose a threat to life and bodily function, if under treated, poorly treated, or with failure to maintain adequate treatment and timely medical follow up. Additionally over treatment has its own set of complications including overdosing on the pain medications and also the habit forming potentials with the use of the medications used to treat chronic painful conditions including therapeutic classes classified as dangerous medications. Given the serious and fluctuating nature of pain level and instensity with extensive consideration for whenever pain changes, there is always the risk of prolonged functional impairment requiring close patient monitoring with regular assessments and reassessments and high level medical decision making at every office visit. The amount and complexity of data reviewed is high given the patient clinical presentation, labs,  data, radiology reports, and other tests as discussed during office visits. Pertinent data whether positive or negative were taken in consideration in the process of making this high level  medical decision.

## 2025-07-23 ENCOUNTER — OFFICE VISIT (OUTPATIENT)
Dept: PULMONOLOGY | Facility: CLINIC | Age: 70
End: 2025-07-23
Payer: MEDICARE

## 2025-07-23 VITALS
HEIGHT: 64 IN | SYSTOLIC BLOOD PRESSURE: 160 MMHG | BODY MASS INDEX: 37.18 KG/M2 | HEART RATE: 69 BPM | DIASTOLIC BLOOD PRESSURE: 89 MMHG | WEIGHT: 217.8 LBS | TEMPERATURE: 97.6 F | OXYGEN SATURATION: 96 %

## 2025-07-23 DIAGNOSIS — J30.1 ALLERGIC RHINITIS DUE TO POLLEN, UNSPECIFIED SEASONALITY: Primary | ICD-10-CM

## 2025-07-23 DIAGNOSIS — F17.209 NICOTINE DEPENDENCE WITH NICOTINE-INDUCED DISORDER, UNSPECIFIED NICOTINE PRODUCT TYPE: ICD-10-CM

## 2025-07-23 PROCEDURE — 99212 OFFICE O/P EST SF 10 MIN: CPT

## 2025-07-23 PROCEDURE — 99213 OFFICE O/P EST LOW 20 MIN: CPT | Performed by: STUDENT IN AN ORGANIZED HEALTH CARE EDUCATION/TRAINING PROGRAM

## 2025-07-23 PROCEDURE — 3079F DIAST BP 80-89 MM HG: CPT | Performed by: STUDENT IN AN ORGANIZED HEALTH CARE EDUCATION/TRAINING PROGRAM

## 2025-07-23 PROCEDURE — 3077F SYST BP >= 140 MM HG: CPT | Performed by: STUDENT IN AN ORGANIZED HEALTH CARE EDUCATION/TRAINING PROGRAM

## 2025-07-23 PROCEDURE — 4010F ACE/ARB THERAPY RXD/TAKEN: CPT | Performed by: STUDENT IN AN ORGANIZED HEALTH CARE EDUCATION/TRAINING PROGRAM

## 2025-07-23 PROCEDURE — 1159F MED LIST DOCD IN RCRD: CPT | Performed by: STUDENT IN AN ORGANIZED HEALTH CARE EDUCATION/TRAINING PROGRAM

## 2025-07-23 PROCEDURE — 3008F BODY MASS INDEX DOCD: CPT | Performed by: STUDENT IN AN ORGANIZED HEALTH CARE EDUCATION/TRAINING PROGRAM

## 2025-07-23 RX ORDER — FLUTICASONE PROPIONATE 50 MCG
1 SPRAY, SUSPENSION (ML) NASAL DAILY
Qty: 16 G | Refills: 12 | Status: SHIPPED | OUTPATIENT
Start: 2025-07-23 | End: 2026-07-23

## 2025-07-23 RX ORDER — VARENICLINE TARTRATE 0.5 (11)-1
KIT ORAL
Qty: 1 EACH | Refills: 1 | Status: SHIPPED | OUTPATIENT
Start: 2025-07-23 | End: 2025-10-21

## 2025-07-23 ASSESSMENT — ENCOUNTER SYMPTOMS
UNEXPECTED WEIGHT CHANGE: 0
PALPITATIONS: 0
FEVER: 0
ABDOMINAL DISTENTION: 0
ABDOMINAL PAIN: 0
ARTHRALGIAS: 0
CHILLS: 0

## 2025-07-23 NOTE — PATIENT INSTRUCTIONS
Thank you for visiting the [facility] today.   Your breathing medications: start flonase for the post nasal drip   Tests: CT scan for lung cancer screening in June   Work on quitting smoking, can use the chantix and patches  Return in 6 months   If you have questions or concerns, call [phone] (option 4)

## 2025-07-23 NOTE — PROGRESS NOTES
Department of Medicine I Division of Pulmonary, Critical Care, and Sleep Medicine   Phone: [phone]  Fax: [phone]    History of Present Illness   Amanda De Jesus is a 70 y.o. female presenting for pulmonary consultation.    Referred by:  Dr. Danielle for dyspnea. I have independently interviewed and examined the patient in the office and reviewed available records.       5/2025    Reports of hemoptysis to pcp in November--CT chest done showing infiltrate and bronchiectasis--was prescribed augmentin   Endorsed long coughing fits to the point she was having hemoptysis (this resolved after 2.5 days)   Is planning for surgery for her  AAA --follows with Dr. Hernandez   Also following with nephrologist for CKD IV--in discussion of needing dialysis --apptmt next week   She is a smoker and has emphysema   Does have  BL LE edema which is improved     MMRC 2    Occasional Wheezing   Deals with allergic rhinitis   Daily cough---worse at night  Does have to clear out lungs in the morning   PCP had prescribed trelegy back in November which she didn't note much of a difference --she didn't continue it   Denies any history of copd or asthma but has seasonal allergies    Review of Systems  Review of Systems   Constitutional:  Negative for chills, fever and unexpected weight change.   Respiratory:          As per hpi   Cardiovascular:  Negative for chest pain, palpitations and leg swelling.   Gastrointestinal:  Negative for abdominal distention and abdominal pain.   Musculoskeletal:  Negative for arthralgias and gait problem.   Skin:  Negative for rash.     All other review of systems are negative and/or non-contributory.    Past Medical History   She has a past medical history of Allergic, Arthritis, Chronic kidney disease, Diabetes mellitus (Multi), Heart disease, Heart disease, unspecified, Hypertension, Other intervertebral disc displacement, lumbar region (09/28/2016), Personal history of other diseases of the circulatory  system (02/01/2019), Personal history of other diseases of the circulatory system, Personal history of other diseases of the respiratory system, Personal history of other endocrine, nutritional and metabolic disease (01/25/2019), Personal history of other endocrine, nutritional and metabolic disease, Personal history of other infectious and parasitic diseases (05/10/2019), and Urinary tract infection (2020).    Immunizations     Immunization History   Administered Date(s) Administered   • COVID-19, mRNA, LNP-S, PF, 30 mcg/0.3 mL dose 03/26/2021, 04/14/2021   • Flu vaccine (IIV4), preservative free *Check age/dose* 09/19/2016, 10/24/2017, 02/15/2019   • Flu vaccine, quadrivalent, high-dose, preservative free, age 65y+ (FLUZONE) 11/19/2020   • Flu vaccine, trivalent, preservative free, HIGH-DOSE, age 65y+ (Fluzone) 10/07/2021, 11/20/2024   • Flu vaccine, trivalent, preservative free, age 6 months and greater (Fluarix/Fluzone/Flulaval) 11/03/2012, 10/26/2013   • Influenza, seasonal, injectable 02/13/2019   • Pneumococcal conjugate vaccine, 13-valent (PREVNAR 13) 10/07/2021   • Pneumococcal conjugate vaccine, 20-valent (PREVNAR 20) 11/20/2024   • Pneumococcal polysaccharide vaccine, 23-valent, age 2 years and older (PNEUMOVAX 23) 08/22/2019       Medications and Allergies     Current Outpatient Medications   Medication Instructions   • amLODIPine (NORVASC) 5 mg, Daily before breakfast   • ascorbic acid, vitamin C, 500 mg capsule Take by mouth. Take as directed   • aspirin 81 mg EC tablet 1 tablet, Daily   • atorvastatin (Lipitor) 80 mg tablet 1 tablet, Daily   • cyanocobalamin (Vitamin B-12) 1,000 mcg tablet Daily   • cyclobenzaprine (FLEXERIL) 5 mg, oral, 2 times daily PRN   • d-mannose 1,500 mg, 2 times daily   • ezetimibe (ZETIA) 10 mg, oral, Daily   • famotidine-Ca carb-mag hydrox (Pepcid Complete) -165 mg chewable tablet 1 tablet, Every 12 hours PRN   • Gemtesa 75 mg, oral, Daily   • [START ON 8/6/2025]  HYDROcodone-acetaminophen (Norco) 5-325 mg tablet 1 tablet, oral, Every 12 hours PRN   • [START ON 9/3/2025] HYDROcodone-acetaminophen (Norco) 5-325 mg tablet 1 tablet, oral, Every 12 hours PRN   • losartan (COZAAR) 100 mg, Daily before breakfast   • metoprolol succinate XL (TOPROL-XL) 100 mg, Daily   • multivitamin tablet 1 tablet, Daily   • naloxone (NARCAN) 4 mg, nasal, As needed, May repeat every 2-3 minutes if needed, alternating nostrils, until medical assistance becomes available.   • nicotine (Nicoderm CQ) 21 mg/24 hr patch 1 patch, transdermal, Every 24 hours   • zinc gluconate 100 mg tablet Daily      Lansoprazole, Semaglutide, and Cefprozil    Social History   She reports that she has been smoking cigarettes. She has never used smokeless tobacco. She reports that she does not currently use alcohol. She reports that she does not use drugs.    Smoking History: 0.5 pack a day- 55 years total; did use chantix in the past (got insominia from it), tried patches.   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)    Family History   Family History[1]  Mother: breast cancer         Surgical History   She has a past surgical history that includes Cholecystectomy (09/26/2013); Other surgical history (05/10/2019); Carpal tunnel release (08/10/2015); Other surgical history (03/01/2019); Other surgical history (01/15/2019); Other surgical history (01/15/2019); Knee arthroscopy w/ debridement (09/29/2016); CT angio abdomen w and or wo IV IV contrast (10/04/2022); CT angio abdomen pelvis w and or wo IV IV contrast (11/02/2023); Joint replacement (Left); Ventriculoperitoneal shunt; Tubal ligation; and Coronary stent placement.    Physical Exam     There were no vitals filed for this visit.      Physical Exam  Vitals reviewed.   Constitutional:       General: She is awake.     Cardiovascular:      Rate and Rhythm: Normal rate and regular rhythm.   Pulmonary:      Effort:  "Pulmonary effort is normal.      Breath sounds: Normal breath sounds.     Musculoskeletal:      Right lower leg: Edema present.      Left lower leg: Edema present.     Neurological:      Mental Status: She is alert and oriented to person, place, and time.     Psychiatric:         Attention and Perception: Attention and perception normal.         Behavior: Behavior normal.          Results   Pulmonary Function Tests:        Chest Radiograph:  XR chest 2 views 11/20/2024    Narrative  TITLE OF EXAM:   XR CHEST 2 VIEWS    REASON FOR EXAM:  \"Coughing up blood\"    TECHNIQUE: 2 radiographs of the chest.    COMPARISON: None    FINDINGS:  Hyperexpansion of lungs with flattening of the hemidiaphragms. Diffuse small airway thickening/prominence. No consolidation. No appreciable nodule/mass. Diffuse reticular opacities. No significant effusion or pneumothorax.  The cardiomediastinal silhouette is normal.  No acute osseous or upper abdominal abnormality.    IMPRESSION:  1. No acute appearing cardiopulmonary abnormality.  2. Chronic appearing small airways disease and fibrotic changes.  3. COPD versus significant inspiratory effort at the time of image acquisition.    DICTATED ON: 11/20/2024 10:26 AM    This report has been electronically signed in approved by the interpreting radiologist.      Chest CT Scan:  CT chest 11/24 (report only)   FINDINGS:   LUNGS and AIRWAYS:   Mild centrilobular and paraseptal emphysema, upper lobe dominant   similar to the prior exam. Cylindrical and saccular bronchiectasis is   present in the upper lobes greater on left, also as previously. A   micronodule in the left lower lung on image 187 of series 202, and 4   mm right lung nodule on image 107 are stable, considered benign.   There is a small patchy area opacity at the central aspect of the   middle lobe, probably due to early pneumonic infiltrate. No   additional infiltrate or effusion.       MEDIASTINUM and GEETA, LOWER NECK AND AXILLA:   The " "visualized thyroid gland is within normal limits.       No evidence of thoracic lymphadenopathy by CT criteria.       HEART and VESSELS:   The thoracic aorta is of normal course and caliber , but with mild   atherosclerotic calcification .       Main pulmonary artery and its branches are normal in caliber.       There is fairly extensive coronary artery atherosclerotic   calcification primarily involving the LAD. This is similar to the   prior exam.       The cardiac chambers are not enlarged.       UPPER ABDOMEN:   Marked left renal cortical atrophy.   Aneurysmal dilatation of the visualized upper abdominal aorta   measuring at least 5 cm, with atherosclerotic calcification and   intraluminal thrombus. This appears similar to the previous exam.       CHEST WALL, OSSEOUS STRUCTURES AND OTHER FINDINGS:   There are no suspicious osseous lesions.       IMPRESSION:   1. Probable early pneumonic infiltrate at the middle lobe.   2. Additional stable nonacute findings as above.        ECHO:  No results found for this or any previous visit from the past 365 days.       Labs:  Lab Results   Component Value Date    WBC 5.9 02/27/2024    HGB 15.2 02/27/2024    HCT 46.0 02/27/2024    MCV 96 02/27/2024     02/27/2024       Lab Results   Component Value Date    CREATININE 1.33 (H) 05/28/2025    BUN 18 05/28/2025     05/28/2025    K 5.0 05/28/2025     05/28/2025    CO2 25 05/28/2025        No results found for: \"DAVIE\", \"RF\", \"SEDRATE\"     IgE: No results found for: \"IGE\"   Respiratory Allergy Panel:  AEC:   Eosinophils Absolute (x10*3/uL)   Date Value   02/27/2024 0.23     Eosinophils % (%)   Date Value   02/27/2024 3.9       Cultures:  No results found for: \"AFBCX\"   No results found for: \"RESPCULTCYFI\"    No results found for the last 90 days.  C     Assessment and Plan       Amanda De Jesus is a 70 y.o. year old female patient with AAA, CKD IV HTN.HLD here for pulmonary evalution given episode of hemoptysis and " findings on CT scan in November--likely related to infection at the time which was treated with abx.  The coughing fits and hemoptysis has resolved.  She is being evaluated for surgery for her AAA and also following with nephrology regarding her CKD.  She does have some mild dyspnea with activity.  Likely has some component of copd given emphysema on scan.  She is still smoking and is interested in quitting     Recommendations  -PFT and walk test  -nicotine patch for smoking cessation--we discussed restarting chantix she has a script at home--will check expiration date  and let me know I can send a new one in   -annual LDCT chest for lung cancer screening given smoking history-->due in June 2026     Return in July       Irais Oneal MD  07/23/2025             [1]  Family History  Problem Relation Name Age of Onset   • Breast cancer Mother Maci Barrera    • Other (Varicose veins) Mother Maci Barrera    • Heart disease Father Tj Barrera    • Kidney failure Father Tj Barrera    • Diabetes type II Father Tj Tafoyae    • Kidney disease Father Tj Barrera    • Diabetes Father Tj Barrera    • Hypertension Father Tj Barrera    • Pancreatic cancer Other sibling    • Cancer Brother Alvin Barrera    • Breast cancer Brother Alvin Barrera    • Cancer Brother Alvin Barrera    • Breast cancer Brother Alvin Barrera    • Stroke Father Tj Tafoyae 60 - 69    the  comparison chest CT are no longer evident .          ABDOMEN  LIVER: Within normal limits.  GALLBLADDER/BILE DUCTS:Cholecystectomy . Mild extrahepatic biliary  ductal dilatation is similar and commonly related to the post  cholecystectomy state SPLEEN: Within normal limits.  PANCREAS: Within normal limits.  ADRENALS: Benign left adrenal adenoma is unchanged in size. Right  adrenal gland is not enlarged. KIDNEYS and URETERS: Left renal  atrophy. Punctate nonobstructing right interpolar intra calyceal  nephrolithiasis. No hydronephrosis. Subcentimeter low-attenuation  left renal lesions are too small to characterize although grossly  similar and statistically likely benign. Normal caliber ureters  without radiopaque calculi. The distal ureters are poorly visualized  due to streak artifact and beam hardening from bilateral hip  arthroplasty hardware PERITONEUM/MESENTERIES: No ascites or free air,  no fluid collection. No pathologically enlarged lymph nodes.  EXTRAPERITONEUM: No ascites or free air, no fluid collection. No  pathologically enlarged lymph nodes. BOWEL: No dilated bowel.  Normal  appendix. Pancolonic diverticulosis without evidence of  diverticulitis. Moderate colonic stool burden.      PELVIS:  BLADDER: Grossly unremarkable. Partially obscured.  REPRODUCTIVE ORGANS: Grossly unremarkable. Partially obscured.          MUSCULOSKELETAL: No acute osseous abnormality. Degenerative changes  of the spine. Bilateral total hip arthroplasty partially imaged. Mild  left SI joint degenerative change.      IMPRESSION:  Bilobed abdominal aortic aneurysm extending from just superior to the  renal vessels to the bifurcation with the mid and inferior components  measuring slightly larger than on the comparison exam likely related  to differences in technique.      Right common iliac artery aneurysm at the bifurcation.      Chronic high-grade stenosis versus occlusion of the left renal artery  at its origin with left  "renal atrophy.      High-grade stenosis of the proximal right renal artery at the origin  measuring 80% without occlusion or morphologic changes to the right  kidney.      Stenosis of the celiac trunk measures 60% proximally which may relate  to a combination of atherosclerosis and compression by the arcuate  ligament      Additional chronic and/or ancillary findings detailed above.  ECHO:  No results found for this or any previous visit from the past 365 days.       Labs:  Lab Results   Component Value Date    WBC 5.9 02/27/2024    HGB 15.2 02/27/2024    HCT 46.0 02/27/2024    MCV 96 02/27/2024     02/27/2024       Lab Results   Component Value Date    CREATININE 1.33 (H) 05/28/2025    BUN 18 05/28/2025     05/28/2025    K 5.0 05/28/2025     05/28/2025    CO2 25 05/28/2025        No results found for: \"DAVIE\", \"RF\", \"SEDRATE\"     IgE: No results found for: \"IGE\"   Respiratory Allergy Panel:  AEC:   Eosinophils Absolute (x10*3/uL)   Date Value   02/27/2024 0.23     Eosinophils % (%)   Date Value   02/27/2024 3.9       Cultures:  No results found for: \"AFBCX\"   No results found for: \"RESPCULTCYFI\"    No results found for the last 90 days.  C     Assessment and Plan       Amanda De Jesus is a 70 y.o. year old female patient with AAA, CKD IV HTN.HLD here for pulmonary evalution given episode of hemoptysis and findings on CT scan in November--likely related to infection at the time which was treated with abx.  The coughing fits and hemoptysis has resolved. Still smoking, increased post nasal drip     Recommendations  -flonase for PND   -nicotine patch  and chantix for smoking cessation  -annual LDCT chest for lung cancer screening given smoking history-->due in June 2026     Return in 6 months          Irais Oneal MD  07/23/2025           [1]   Family History  Problem Relation Name Age of Onset    Breast cancer Mother Maci Barrera     Other (Varicose veins) Mother Maci Barrera     Heart disease Father " Tj Barrera     Kidney failure Father Tj Barrera     Diabetes type II Father Tj Hloly     Kidney disease Father Tj Holly     Diabetes Father Tj Barrera     Hypertension Father Tj Barrera     Pancreatic cancer Other sibling     Cancer Brother Alvin Barrera     Breast cancer Brother Alvin Barrera     Cancer Brother Alvin Barrera     Breast cancer Brother Alvin Barrera     Stroke Father Tj Barrera 60 - 69

## 2025-07-24 ENCOUNTER — PHARMACY VISIT (OUTPATIENT)
Dept: PHARMACY | Facility: CLINIC | Age: 70
End: 2025-07-24
Payer: COMMERCIAL

## 2025-08-04 ENCOUNTER — APPOINTMENT (OUTPATIENT)
Dept: PHARMACY | Facility: HOSPITAL | Age: 70
End: 2025-08-04
Payer: MEDICARE

## 2025-08-12 ENCOUNTER — OFFICE VISIT (OUTPATIENT)
Dept: CARDIOLOGY | Facility: HOSPITAL | Age: 70
End: 2025-08-12
Payer: MEDICARE

## 2025-08-12 VITALS
WEIGHT: 217.8 LBS | BODY MASS INDEX: 37.18 KG/M2 | HEART RATE: 63 BPM | RESPIRATION RATE: 18 BRPM | HEIGHT: 64 IN | SYSTOLIC BLOOD PRESSURE: 145 MMHG | DIASTOLIC BLOOD PRESSURE: 90 MMHG

## 2025-08-12 DIAGNOSIS — I25.10 CORONARY ARTERY DISEASE INVOLVING NATIVE CORONARY ARTERY OF NATIVE HEART, UNSPECIFIED WHETHER ANGINA PRESENT: Primary | ICD-10-CM

## 2025-08-12 PROCEDURE — 93005 ELECTROCARDIOGRAM TRACING: CPT | Performed by: INTERNAL MEDICINE

## 2025-08-12 PROCEDURE — 1159F MED LIST DOCD IN RCRD: CPT | Performed by: INTERNAL MEDICINE

## 2025-08-12 PROCEDURE — 1160F RVW MEDS BY RX/DR IN RCRD: CPT | Performed by: INTERNAL MEDICINE

## 2025-08-12 PROCEDURE — 3080F DIAST BP >= 90 MM HG: CPT | Performed by: INTERNAL MEDICINE

## 2025-08-12 PROCEDURE — 4010F ACE/ARB THERAPY RXD/TAKEN: CPT | Performed by: INTERNAL MEDICINE

## 2025-08-12 PROCEDURE — 3077F SYST BP >= 140 MM HG: CPT | Performed by: INTERNAL MEDICINE

## 2025-08-12 PROCEDURE — 3008F BODY MASS INDEX DOCD: CPT | Performed by: INTERNAL MEDICINE

## 2025-08-12 PROCEDURE — 93010 ELECTROCARDIOGRAM REPORT: CPT | Performed by: INTERNAL MEDICINE

## 2025-08-12 PROCEDURE — 99212 OFFICE O/P EST SF 10 MIN: CPT | Mod: 25

## 2025-08-12 PROCEDURE — 99214 OFFICE O/P EST MOD 30 MIN: CPT | Performed by: INTERNAL MEDICINE

## 2025-08-12 RX ORDER — AMLODIPINE BESYLATE 5 MG/1
5 TABLET ORAL 2 TIMES DAILY
Qty: 180 TABLET | Refills: 3 | Status: SHIPPED | OUTPATIENT
Start: 2025-08-12 | End: 2026-08-12

## 2025-08-13 ENCOUNTER — APPOINTMENT (OUTPATIENT)
Dept: PHARMACY | Facility: HOSPITAL | Age: 70
End: 2025-08-13
Payer: MEDICARE

## 2025-08-14 LAB
ATRIAL RATE: 63 BPM
P AXIS: 21 DEGREES
P OFFSET: 192 MS
P ONSET: 139 MS
PR INTERVAL: 176 MS
Q ONSET: 227 MS
QRS COUNT: 10 BEATS
QRS DURATION: 78 MS
QT INTERVAL: 426 MS
QTC CALCULATION(BAZETT): 435 MS
QTC FREDERICIA: 433 MS
R AXIS: 78 DEGREES
T AXIS: 52 DEGREES
T OFFSET: 440 MS
VENTRICULAR RATE: 63 BPM

## 2025-08-18 PROCEDURE — RXMED WILLOW AMBULATORY MEDICATION CHARGE

## 2025-08-20 ENCOUNTER — TELEPHONE (OUTPATIENT)
Dept: PHARMACY | Facility: HOSPITAL | Age: 70
End: 2025-08-20
Payer: MEDICARE

## 2025-08-21 ENCOUNTER — PHARMACY VISIT (OUTPATIENT)
Dept: PHARMACY | Facility: CLINIC | Age: 70
End: 2025-08-21
Payer: COMMERCIAL

## 2025-08-28 ENCOUNTER — TELEPHONE (OUTPATIENT)
Dept: PHARMACY | Facility: HOSPITAL | Age: 70
End: 2025-08-28
Payer: MEDICARE

## 2025-09-23 ENCOUNTER — APPOINTMENT (OUTPATIENT)
Dept: PAIN MEDICINE | Facility: CLINIC | Age: 70
End: 2025-09-23
Payer: MEDICARE

## 2025-10-10 ENCOUNTER — APPOINTMENT (OUTPATIENT)
Dept: PHARMACY | Facility: HOSPITAL | Age: 70
End: 2025-10-10
Payer: MEDICARE

## 2025-10-23 ENCOUNTER — APPOINTMENT (OUTPATIENT)
Dept: UROLOGY | Facility: CLINIC | Age: 70
End: 2025-10-23
Payer: MEDICARE

## 2025-10-28 ENCOUNTER — APPOINTMENT (OUTPATIENT)
Dept: UROLOGY | Facility: CLINIC | Age: 70
End: 2025-10-28
Payer: MEDICARE